# Patient Record
(demographics unavailable — no encounter records)

---

## 2017-02-07 NOTE — OP
DATE OF SURGERY:  02/07/2017



PREOPERATIVE DIAGNOSIS:  Renal failure, dialysis dependent.



POSTOPERATIVE DIAGNOSIS:  Renal failure, dialysis dependent.



PROCEDURE PERFORMED:  Placement of left forearm arteriovenous fistula.



SURGEON:  Dr. Real Escudero



ANESTHESIA:  Local MAC.



INDICATIONS:  A 52-year-old diabetic male with severe peripheral vascular

disease and end-stage renal disease who has had a previous below knee

amputation, is currently on dialysis by way of a right internal jugular dialysis

catheter.  He needs long-term access.  His exam shows an adequate cephalic vein

in the forearm on the left.  There was good blood flow in the radial artery on

the left, but it is significantly calcified.  Recommendation was for left

radiocephalic arteriovenous fistula.



PROCEDURE:  After informed consent was obtained, the patient was prepped and

draped in a sterile fashion.  A longitudinal incision was made between the

cephalic vein and the radial artery.  The radial artery was isolated, was

severely calcified, nearly nonpulsatile but had excellent Doppler flow signals

in it.  The cephalic vein was then mobilized.  Distal branches were clamped,

divided and ligated with 3-0 silk suture.  A branch segment was split

longitudinally to allow for a spatulated end-to-side anastomosis onto the ____

artery.  The patient received 3000 units of intravenous heparin.  Vessel loops

were utilized on this vessel which did not provide any adequate hemostasis. 

Therefore, small ring handle bulldogs were then placed on the vessel to stop

bleeding from this severely calcified vessel.  The vein was trimmed and an

end-to-side anastomosis was completed with a 7-0 Prolene suture.  Prior to

completion of the anastomosis, the clamps were removed.  The outflow radial

artery and the inflow radial artery were both dilated with a 1.5 mm Bakes

dilator.  Excellent pulsatile flow was noted and blood flow was reinitiated

through the cephalic vein.  Suture line was completed.  Hemostasis appeared to

be excellent.  The vein was mobilized a little bit.  It dilated nicely and there

was excellent outflow thrill.  There was excellent forward flow in the radial

artery with fistula compression, but only weak forward flow into the hand with

the fistula being open.



The patient did tolerate the procedure well and was moved from the operating

room to recovery in satisfactory stable condition.

 



______________________________

REAL ESCUDERO MD



DR:  ERIC/tanya  JOB#:  [id number] / [id number]

DD:  02/07/2017 12:59  DT:  02/07/2017 18:14

## 2017-02-07 NOTE — PDOC
VASCULAR BRIEF OPERATIVE NOTE


Date:  Feb 7, 2017


Pre-Op Diagnosis


ESRD


Post-Op Diagnosis


same


Procedure Performed


fernanda fistula , left


Surgeon


Kena


Anesthesia Type:  MAC, Local








REAL NEWMAN MD Feb 7, 2017 13:06

## 2017-04-13 NOTE — PDOC
MODERATE SEDATION ASSESSMENT


RISKS/ALTERNATIVES


Risks/Alternatives


Risks and alternatives of this type of sedation and procedure discussed with:


RISK/ALTERNATIVES:  Patient





H & P ON CHART


H & P


H & P on chart and reviewed for co-morbid conditions and appropriate labs.


H&P ON CHART:  Yes





PREGNANCY STATUS


PREG STATUS ASSESSED:  N/A





MEDS/ALLERGIES REVIEWED


Meds/Allergies Reviewed


Medications and Allergies including time and route of recently administered 

narcotics and sedatives.


MEDS/ALLERGIES REVIEWED:  Yes





ASA RATING


ASA RATING:  II





AIRWAY ASSESSMENT


Airway Assessment


Airway patency, oral function limitations, presence  of caps, crowns, dentures, 

partials, and ability to extend neck assessed.


AIRWAY ASSESSMENT:  Yes





MALLAMPATI SCORE


MALLAMPATI SCORE:  II





PRE-SEDATION ASSESSMENT


PRE-SEDATION ASSESSMENT:  Yes








CHANTELLE CARR MD Apr 13, 2017 10:39

## 2017-04-13 NOTE — PDOC1
History and Physical


Date of Procedure


Date of Admission


4/13/17





Procedure


Procedure


Left forearm RC AVF gram +/- intervention





Indication


Indication


54 YO male with ESRD, and with slowly maturing left forearm AVF.  Juxta-

anastomotic stricture by outside noninvasive study.





Past Medical History


Past Medical History


See Nursing Pre procedure PMH





Past Surgical History


Past Surgical History


See Nursing Pre procedure PSH





Current Medications


Current Medications





 Current Medications


Lidocaine/Sodium Bicarbonate 20 ml 20 ml STK-MED ONCE IJ ;  Start 4/13/17 at 08:

41;  Stop 4/13/17 at 08:42;  Status DC


Heparin Sodium/ Sodium Chloride 500 ml @  As Directed STK-MED ONCE .ROUTE ;  

Start 4/13/17 at 08:42;  Stop 4/13/17 at 08:43;  Status DC


Iodixanol (Visipaque 320) 100 ml STK-MED ONCE .ROUTE ;  Start 4/13/17 at 08:42;

  Stop 4/13/17 at 08:43;  Status DC


Iodixanol (Visipaque 320) 50 ml STK-MED ONCE .ROUTE ;  Start 4/13/17 at 08:42;  

Stop 4/13/17 at 08:43;  Status DC


Fentanyl Citrate (Fentanyl 2ml Vial) 100 mcg STK-MED ONCE .ROUTE ;  Start 4/13/ 17 at 08:54;  Stop 4/13/17 at 08:55;  Status DC


Midazolam HCl (Versed) 2 mg STK-MED ONCE .ROUTE ;  Start 4/13/17 at 08:55;  

Stop 4/13/17 at 08:56;  Status DC


Heparin Sodium (Porcine) (Heparin Sodium) 10,000 unit STK-MED ONCE .ROUTE ;  

Start 4/13/17 at 08:55;  Stop 4/13/17 at 08:56;  Status DC


Iodixanol (Visipaque 320) 100 ml STK-MED ONCE .ROUTE ;  Start 4/13/17 at 09:22;

  Stop 4/13/17 at 09:23;  Status DC


Heparin Sodium/ Sodium Chloride 1,000 unit 1X  ONCE IART  Last administered on 4 /13/17at 10:34;  Start 4/13/17 at 10:00;  Stop 4/13/17 at 10:05;  Status DC


Lidocaine/Sodium Bicarbonate (Buffered Lidocaine 1%) 20 ml 1X  ONCE IJ  Last 

administered on 4/13/17at 10:35;  Start 4/13/17 at 10:00;  Stop 4/13/17 at 10:05

;  Status DC


Midazolam HCl (Versed) 2 mg 1X  ONCE IV  Last administered on 4/13/17at 10:35;  

Start 4/13/17 at 10:00;  Stop 4/13/17 at 10:05;  Status DC


Fentanyl Citrate (Fentanyl 2ml Vial) 100 mcg 1X  ONCE IV  Last administered on 4 /13/17at 10:36;  Start 4/13/17 at 10:00;  Stop 4/13/17 at 10:05;  Status DC


Iodixanol (Visipaque 320) 100 ml 1X  ONCE IART  Last administered on 4/13/17at 

10:34;  Start 4/13/17 at 10:00;  Stop 4/13/17 at 10:05;  Status DC


Heparin Sodium (Porcine) (Heparin Sodium) 3,000 unit 1X  ONCE IV  Last 

administered on 4/13/17at 10:36;  Start 4/13/17 at 10:15;  Stop 4/13/17 at 10:16

;  Status DC





Active Scripts


Active


Sodium Bicarbonate 650 Mg Tablet 650 Mg PO TID


Cozaar (Losartan Potassium) 50 Mg Tablet 100 Mg PO DAILY08


Gabapentin 300 Mg Capsule 300 Mg PO DAILY


Reported


Isosorbide Mononitrate 20 Mg Tablet 60 Mg PO DAILY


Clopidogrel (Clopidogrel Bisulfate) 75 Mg Tablet 1 Tab PO DAILY


Aspirin 325 Mg Tablet 1 Tab PO DAILY


Ranexa (Ranolazine) 500 Mg Tab.er.12h 1 Tab PO BID


Humulin N Kwikpen (Nph, Human Insulin Isophane) 100 Unit/1 Ml Insuln.pen 40 

Unit SQ BIDAC


Percocet  Mg Tablet (Oxycodone/Acetaminophen) 1 Each Tablet 1 Tab PO QID


Amlodipine Besylate 10 Mg Tablet 10 Mg PO DAILY


Ventolin Hfa Inhaler (Albuterol Sulfate) 18 Gm Hfa.aer.ad 2 Puff INH Q4HRS PRN


Carvedilol 25 Mg Tablet 25 Mg PO BID





Allergies


Allergies:  


Coded Allergies:  


     No Known Drug Allergies (Unverified , 2/7/17)





Physical Exam


Vital Signs





 Vital Signs








  Date Time  Temp Pulse Resp B/P Pulse Ox O2 Delivery O2 Flow Rate FiO2


 


4/13/17 10:36   14  98 Nasal Cannula 2.0 


 


4/13/17 07:55 98.0 82  116/81    





 98.0       








Lungs:  Clear to auscultation


Heart:  Regular rate


Psych/Mental Status:  Mental status NL


Other


Patent left RC AVF, with suboptimal thrill.





Diagnostic Data/Imaging


Images


No prior imaging available





Assessment


Assessment


ESRD with slowly maturing left forearm RC AVF, and with juxta-anastomotic 

stricture on outside noninvasive study.


Problems:  





Plan


Plan


Dx AVF gram +/- intervention---probable PTA juxta-anastomotic stricture.








CHANTELLE CARR MD Apr 13, 2017 10:44

## 2017-04-13 NOTE — PDOC
Exam








Adalgisa





Assistant


Assistant


F Ndumbu





Pre-Procedure Diagnosis


Pre-Procedure Diagnosis


ESRD with slowly maturing left forearm RC AVF, with juxta-anastomotic stricture 

by outside noninvasive study..





Post-Procedure Diagnosis


Post-Procedure Diagnosis


Same





Procedure Performed


Procedure Performed


Left forearm AVF gram via sono guided brachial artery puncture and via sono 

guided cephalic vein puncture.


PTA juxta-anastomotic cephalic vein stricture.





Type of Anesthesia


Type of Anesthesia


Local + Mod sedation





Estimated Blood Loss


EBL:


25 cc





Condition of Patient


Condition of Patient


Stable. No apparent complication.





Disposition


Disposition


Home from [facility] post recovery, if no problems.  F/u with Vascular Surgery.  

Full report to follow.  Continue HD via tunneled HDC until AVF matures.








CHANTELLE CARR MD Apr 13, 2017 10:52

## 2017-04-14 NOTE — RAD
Left forearm radiocephalic AV fistulogram with angioplasty



Indication: 53-year-old male with end stage renal disease.  Delayed maturation

of left forearm radiocephalic AV fistula.   Significant juxta anastomotic

stricture by outside noninvasive study.  AV fistulogram, with possible

intervention, has been requested by vascular surgery.



Fluoroscopy time: 17.4 minutes



Kerma-area Product:    30 Gycm2



Contrast material: 97 cc Visipaque 320



Anesthesia: 85 minutes moderate sedation was provided utilizing a total of 2

mg Versed and 100 mcg fentanyl, IV. The patient was appropriately monitored by

a qualified independent observer throughout the time of moderate sedation.



Consent: The procedure was explained in its entirety to the patient and/or the

patient's designated representative by a member of the treatment team. This

included a discussion of risks and benefits and commonly accepted alternatives

to the procedure, as well as expected consequences of no treatment at all.

Discussion of risks included, but was not limited to, those that are most

frequent and those that are rare, but possibly severe or life-threatening, as

well as the possibility of unforeseen complications.



Sterility: All elements of maximal sterile barrier technique were utilized,

including cap, mask, sterile gown, sterile gloves, large sterile sheet,

appropriate hand hygiene, and 2% chlorhexidine for cutaneous antisepsis.





Procedure: Informed consent was obtained from the patient. He was placed

supine on the angiography table. Preliminary ultrasound examination over

distal left upper arm revealed widely patent left brachial artery. This was

documented with a hard copy ultrasound image. Using aseptic technique, local

anesthesia, and direct ultrasound guidance, a micropuncture needle was

successfully introduced into distal left brachial artery. The micropuncture

needle was exchanged over a microguidewire for the 3 Kazakh inner dilator of a

micropuncture sheath.  Preliminary ultrasound examination over distal left

upper arm also revealed widely patent cephalic outflow vein. This was

documented with an additional hard copy ultrasound image. Using aseptic

technique, local anesthesia, direct ultrasound guidance, and the micropuncture

system, a 4 Kazakh sheath was successfully introduced into left cephalic

outflow vein, just above elbow, with its tip directed peripherally.



AV fistulogram: Visipaque 320 was injected through the 3 Kazakh brachial

artery dilator, and DSA images were obtained from radiocephalic anastomosis

through elbow. Additional Visipaque 320 was injected through the 4 Kazakh

cephalic outflow vein sheath, and DSA images were obtained from elbow through

right atrium.

Findings: Radiocephalic AV anastomosis is widely patent. Native radial artery

above the anastomosis is diffusely calcific, without focal hemodynamically

significant stenosis. Tandem stenoses are noted within juxta anastomotic

segment of left cephalic outflow vein, one moderate and one high-grade. 

Cephalic outflow vein is otherwise widely patent through cephalic arch.

Additional AV fistula outflow occurs via basilic vein, which is widely patent

from proximal forearm through axillary vein. Left upper extremity central

venous structures are widely patent, without significant axillary vein,

subclavian vein, or innominate vein stenosis. Superior vena cava is widely

patent. Indwelling right IJ tunneled hemodialysis catheter is noted, with its

tip at cavoatrial junction.



Cephalic vein PTA: The 4 Kazakh cephalic outflow vein sheath was exchanged

over a guidewire for a 5 Kazakh sheath. Subsequently, following IV bolus

administration of 3000 units heparin, a grand slam microguidewire was advanced

through a 4 Kazakh angled glide catheter peripherally within left cephalic

outflow vein, across AV anastomosis, into distal left radial artery just above

wrist. Utilizing control injections through the distal brachial artery dilator

for guidance, a 4 mm x 40 mm Cordis chocolate PTA balloon was advanced through

the 5 Kazakh sheath over the grand slam wire, and was utilized to perform

prolonged (5 minutes) balloon dilatation of the tandem juxta anastomotic

cephalic vein strictures to a peak pressure of 12 nessa.  The chocolate PTA

balloon was then deflated and removed.  Visipaque 320 was again injected

through the distal brachial artery dilator, and post angioplasty DSA images

were obtained. These images revealed suboptimal angiographic improvement.

Therefore, a 5 mm x 20 mm Cordis aviator PTA balloon was advanced through the

cephalic vein sheath over the grand slam wire and was utilized to perform

additional prolonged (4 minutes) balloon dilatation of juxta anastomotic

cephalic vein to a peak pressure of 14 nessa.  The aviator PTA balloon was

deflated and removed. Visipaque 320 was again injected through the brachial

artery dilator and completion DSA images were obtained. These images revealed

brisk antegrade flow through the AV fistula, with complete resolution of the

juxta anastomotic cephalic vein stenoses, and without complicating dissection.



Patient tolerated the procedures well without apparent complication.

Hemostasis was achieved at the cephalic vein sheath removal site utilizing 2-0

silk in a pursestring fashion. Hemostasis was achieved at the distal brachial

artery 3 Kazakh dilator removal site utilizing manual pressure.



Impression:

1. Essentially unremarkable distal left forearm radiocephalic AV fistula,

apart from tandem moderate and high-grade juxta anastomotic cephalic vein

strictures.

2. Successful, uneventful balloon dilatation of juxta anastomotic cephalic

vein stenoses, as described.

## 2017-05-16 NOTE — ED.ADGEN
Past Medical History


Past Medical History:  CAD, Diabetes-Type II, Hypertension, Renal Disease, 

Renal Failure, Other


Additional Past Medical Histor:  MILD MI?


Past Surgical History:  Other


Additional Past Surgical Histo:  L BKA, L ARM FISTULA, SHUNT R CHEST


Alcohol Use:  None


Drug Use:  None





Adult General


Chief Complaint


Chief Complaint:  CHEST PAIN





HPI


HPI





Patient is a 53  year old dialysis patient who presents with generalized 

weakness and fatigue since bleeding dialysis yesterday. Patient was on route to 

his PCPs office this afternoon when he broke out in a sweat diverted to come to 

the emergency department. Patient reports occasional chest tightness, but he 

denies any chest discomfort or pain while in the emergency department. He 

denies shortness of breath, dictations, increased leg pain or swelling. He 

denies orthopnea and dyspnea on exertion. Patient denies fever chills, nausea 

vomiting and sweats. No other acute symptoms or complaints. He is accompanied 

at bedside by his spouse.





Review of Systems


Review of Systems


ROS as per HPI





Current Medications


Current Medications





Current Medications








 Medications


  (Trade)  Dose


 Ordered  Sig/Yisel  Start Time


 Stop Time Status Last Admin


Dose Admin


 


 Sodium Chloride  1,000 ml @ 


 1,000 mls/hr  1X  ONCE  5/16/17 17:30


 5/16/17 18:29 DC 5/16/17 17:39


1,000 MLS/HR











Allergies


Allergies





Allergies








Coded Allergies Type Severity Reaction Last Updated Verified


 


  No Known Drug Allergies    2/7/17 No











Physical Exam


Physical Exam





Constitutional: Well developed, well nourished, no acute distress, non-toxic 

appearance. 


HENT: Normocephalic, atraumatic, bilateral external ears normal, oropharynx 

moist, no oral exudates, nose normal.


Eyes: PERRLA, EOMI, conjunctiva normal, no discharge. 


Neck: Normal range of motion, no tenderness.


Cardiovascular:Heart rate regular rhythm, no murmur. Right chest wall vascular 

catheter.


Lungs & Thorax:  Bilateral breath sounds clear to auscultation.


Abdomen: Bowel sounds normal, soft, no tenderness, no masses, no pulsatile 

masses. 


Skin: Warm, dry, no erythema, no rash. 


Back: No tenderness.


Extremities: No tenderness, left leg amputation.


Neurologic: Alert and oriented X 3, normal motor function, normal sensory 

function, no focal deficits noted.


Psychologic: Affect normal, judgement normal, mood normal.





Current Patient Data


Vital Signs





 Vital Signs








  Date Time  Temp Pulse Resp B/P (MAP) Pulse Ox O2 Delivery O2 Flow Rate FiO2


 


5/16/17 18:52  86  195/94 (127) 95 Room Air  


 


5/16/17 16:20 98.2  18     





 98.2       








Lab Values





 Laboratory Tests








Test


  5/16/17


16:25 5/16/17


17:37 5/16/17


17:59


 


White Blood Count


  8.2 x10^3/uL


(4.0-11.0) 


  


 


 


Red Blood Count


  4.59 x10^6/uL


(4.30-5.70) 


  


 


 


Hemoglobin


  12.6 g/dL


(13.0-17.5)  L 


  


 


 


Hematocrit


  37.6 %


(39.0-53.0)  L 


  


 


 


Mean Corpuscular Volume


  82 fL ()


  


  


 


 


Mean Corpuscular Hemoglobin 28 pg (25-35)    


 


Mean Corpuscular Hemoglobin


Concent 34 g/dL


(31-37) 


  


 


 


Red Cell Distribution Width


  16.0 %


(11.5-14.5)  H 


  


 


 


Platelet Count


  222 x10^3/uL


(140-400) 


  


 


 


Neutrophils (%) (Auto) 52 % (31-73)    


 


Lymphocytes (%) (Auto) 36 % (24-48)    


 


Monocytes (%) (Auto) 9 % (0-9)    


 


Eosinophils (%) (Auto) 2 % (0-3)    


 


Basophils (%) (Auto) 1 % (0-3)    


 


Neutrophils # (Auto)


  4.2 x10^3uL


(1.8-7.7) 


  


 


 


Lymphocytes # (Auto)


  3.0 x10^3/uL


(1.0-4.8) 


  


 


 


Monocytes # (Auto)


  0.8 x10^3/uL


(0.0-1.1) 


  


 


 


Eosinophils # (Auto)


  0.2 x10^3/uL


(0.0-0.7) 


  


 


 


Basophils # (Auto)


  0.1 x10^3/uL


(0.0-0.2) 


  


 


 


Sodium Level


  137 mmol/L


(136-145) 


  


 


 


Potassium Level


  4.3 mmol/L


(3.5-5.1) 


  


 


 


Chloride Level


  100 mmol/L


() 


  


 


 


Carbon Dioxide Level


  27 mmol/L


(21-32) 


  


 


 


Anion Gap 10 (6-14)    


 


Blood Urea Nitrogen


  33 mg/dL


(8-26)  H 


  


 


 


Creatinine


  3.3 mg/dL


(0.7-1.3)  H 


  


 


 


Estimated GFR


(Cockcroft-Gault) 19.7  


  


  


 


 


BUN/Creatinine Ratio 10 (6-20)    


 


Glucose Level


  232 mg/dL


(70-99)  H 


  


 


 


Calcium Level


  9.0 mg/dL


(8.5-10.1) 


  


 


 


Total Bilirubin


  0.2 mg/dL


(0.2-1.0) 


  


 


 


Aspartate Amino Transferase


(AST) 18 U/L (15-37)


  


  


 


 


Alanine Aminotransferase (ALT)


  22 U/L (16-63)


  


  


 


 


Alkaline Phosphatase


  87 U/L


() 


  


 


 


Troponin I Quantitative


  < 0.017 ng/mL


(0.000-0.055) 


  


 


 


NT-Pro-B-Type Natriuretic


Peptide 716 pg/mL


(0-124)  H 


  


 


 


Total Protein


  8.1 g/dL


(6.4-8.2) 


  


 


 


Albumin


  3.5 g/dL


(3.4-5.0) 


  


 


 


Albumin/Globulin Ratio


  0.8 (1.0-1.7)


L 


  


 


 


Glucose (Fingerstick)


  


  223 mg/dL


(70-99)  H 


 


 


Urine Collection Type   Unknown  


 


Urine Color   Yellow  


 


Urine Clarity   Clear  


 


Urine pH   7.5  


 


Urine Specific Gravity   1.015  


 


Urine Protein


  


  


  >=300 mg/dL


(NEG-TRACE)


 


Urine Glucose (UA)


  


  


  500 mg/dL


(NEG)


 


Urine Ketones (Stick)


  


  


  Negative mg/dL


(NEG)


 


Urine Blood   Trace (NEG)  


 


Urine Nitrite


  


  


  Negative (NEG)


 


 


Urine Bilirubin


  


  


  Negative (NEG)


 


 


Urine Urobilinogen Dipstick


  


  


  0.2 mg/dL (0.2


mg/dL)


 


Urine Leukocyte Esterase


  


  


  Negative (NEG)


 


 


Urine RBC


  


  


  6-10 /HPF


(0-2)


 


Urine WBC   0 /HPF (0-4)  


 


Urine Bacteria


  


  


  0 /HPF (0-FEW)


 


 


Urine Hyaline Casts


  


  


  Occasional


/HPF





 Laboratory Tests


5/16/17 16:25








 Laboratory Tests


5/16/17 16:25














EKG


EKG


[EKG: Normal sinus rhythm, no acute ST-T wave changes.]





Radiology/Procedures


Radiology/Procedures


Chest x-ray: No acute cardiopulmonary disease []





Course & Med Decision Making


Course & Med Decision Making


Pertinent Labs and Imaging studies reviewed. (See chart for details)





[Patient states symptoms are worse yesterday after dialysis the patient was 

outdoors in the heat. He and did not have his air conditioning turn on inside 

of his home. IV fluids given, patient states he feels better. Stands and walks 

around the emergency department states his symptoms resolved and wishes to go 

home. Lab work does not show evidence of acute disease. Will discharge home 

with instructions to follow-up with PCP and cardiologist. Return precautions 

reviewed.]





Dragon Disclaimer


Dragon Disclaimer


This electronic medical record was generated, in whole or in part, using a 

voice recognition dictation system.











ERNA MASCORRO DO May 16, 2017 22:31

## 2017-05-17 NOTE — RAD
Portable chest, 5/16/2017:



History: Shortness of breath



Comparison is made to a study from 12/23/2016. A multilumen right-sided

central venous catheter remains in place extending to the level of the

atriocaval junction. The heart size and pulmonary vascularity are normal. No

pulmonary infiltrates are seen. There is no evidence of pleural fluid.



IMPRESSION: No acute cardiopulmonary abnormality is detected.

## 2017-05-17 NOTE — EKG
[facility]

              [address] [place] [address]

Test Date:    2017               Test Time:    16:29:17

Pat Name:     ARABELLA WASHINGTON           Department:   

Patient ID:   [id number]           Room:          

Gender:       M                        Technician:   

:          [id number]               Requested By: ERNA MASCORRO

Order Number: [id number]            Reading MD:   Michael Tucker

                                 Measurements

Intervals                              Axis          

Rate:         83                       P:            29

VT:           184                      QRS:          -39

QRSD:         92                       T:            28

QT:           364                                    

QTc:          428                                    

                           Interpretive Statements

SINUS RHYTHM

ABNORMAL LEFT AXIS DEVIATION



Electronically Signed On 2017 9:11:33 CDT by Michael Tucker

## 2017-05-24 NOTE — PHYS DOC
Past Medical History


Past Medical History:  CAD, Diabetes-Type II, Hypertension, Renal Disease, 

Renal Failure, Other


Past Surgical History:  Other


Additional Past Surgical Histo:  L BKA, L ARM FISTULA, SHUNT R CHEST


Alcohol Use:  None


Drug Use:  None





Adult General


Chief Complaint


Chief Complaint:  LOWER EXT PAIN





HPI


HPI





Patient is a 53  year old male who presents with left BKA stump pain and 

swelling noted gradually worsening after a fall.  Says he took a step without 

firm seating of his prosthetic and landed on the ground on his stump.  He 

denies numbness, tingling, weakness. He denies f/c, streaking, or drainage.





Review of Systems


Review of Systems





Constitutional: Denies fever or chills []


Eyes: Denies change in visual acuity, redness, or eye pain []


HENT: Denies nasal congestion or sore throat []


Respiratory: Denies cough or shortness of breath []


Cardiovascular: No additional information not addressed in HPI []


GI: Denies abdominal pain, nausea, vomiting, bloody stools or diarrhea []


: Denies dysuria or hematuria []


Musculoskeletal: Denies back pain  []


Integument: Denies rash or skin lesions []


Neurologic: Denies headache, focal weakness or sensory changes []


Endocrine: Denies polyuria or polydipsia []





Allergies


Allergies





Allergies








Coded Allergies Type Severity Reaction Last Updated Verified


 


  No Known Drug Allergies    2/7/17 No











Physical Exam


Physical Exam





Constitutional: Well developed, well nourished, no acute distress, non-toxic 

appearance. []


HENT: Normocephalic, atraumatic, bilateral external ears normal, oropharynx 

moist, nose normal. []


Eyes: PERRLA, EOMI. [] 


Neck: Normal range of motion, supple. [] 


Cardiovascular:Heart rate regular rhythm []


Lungs & Thorax:  Bilateral breath sounds clear to auscultation []


Abdomen: Bowel sounds normal, soft, no tenderness. [] 


Skin: Warm, dry, no erythema, no rash. [] 


Back: Normal ROM. [] 


Extremities: LLE with BKA, medial distal portion of extremity with rubor and 

central fluctuance of 0.5cm, no central clearing or color change, no induration/

warmth/crepitance, ROM intact 


Neurologic: Alert and oriented X 3, normal motor function, normal sensory 

function, no focal deficits noted. []


Psychologic: Affect normal, judgement normal, mood normal. []





Current Patient Data


Vital Signs





 Vital Signs








  Date Time  Temp Pulse Resp B/P (MAP) Pulse Ox O2 Delivery O2 Flow Rate FiO2


 


5/24/17 13:18 98.2 90 20  97 Room Air  





 98.2       











Course & Med Decision Making


Course & Med Decision Making


Most likely has hematoma, but will treat with antibiotics for possible 

infection.  Recommend supportive care and close follow up with PCP.  Discussed 

to do frequent wound checks.  Return precautions given.  He understands and 

agrees with plan.





Dragon Disclaimer


Dragon Disclaimer


This electronic medical record was generated, in whole or in part, using a 

voice recognition dictation system.





Departure


Departure


Impression:  


 Primary Impression:  


 Leg pain, left


Disposition:  01 HOME, SELF-CARE


Condition:  STABLE


Referrals:  


ARIC MATUTE MD (PCP)


Patient Instructions:  Hematoma, Easy-to-Read





Additional Instructions:  


Take Bactrim for possible skin infection. Take hydrocodone as needed for severe 

pain. Do not drink, drive or operate heavy machinery after taking hydrocodone 

as it may make you sleepy. Follow-up with your primary care doctor within one 

week. Return for any concerns.


Scripts


Hydrocodone Bit/Acetaminophen (HYDROCODONE-APAP 5-325  **) 1 Each Tablet


1 TAB PO PRN Q6HRS Y for PAIN, #10 TAB 0 Refills


   Prov: LIANNE HOU MD         5/24/17 


Sulfamethoxazole/Trimethoprim (BACTRIM DS TABLET) 1 Each Tablet


1 TAB PO DAILY, #7 TAB


   Prov: LIANNE HOU MD         5/24/17











LIANNE HOU MD May 24, 2017 14:26

## 2017-07-13 NOTE — PDOC
VASCULAR BRIEF OPERATIVE NOTE


Date:  Jul 13, 2017


Pre-Op Diagnosis


Renal failure.


Post-Op Diagnosis


Same


Procedure Performed


Revision of left forearm AV fistula with transposition of the cephalic vein 

onto the more proximal radial artery


Surgeon


Kena


Anesthesia Type:  MAC, Local


Other


Indication for operative procedure.





This is a 53-year-old male with end-stage renal disease, dialysis dependent. He 

has had a previous forearm AV fistula placed on the left. This has failed to 

mature adequately. Recent fistula duplex examination so shows flow volume of 

271 mL/m. There is a probable radial artery occlusion distal to the AV fistula.





Recommendation was for revision of the AV fistula with transposition to more 

proximal radial artery segment.





Description of the operative procedure: Patient was brought to the operating 

room. Intravenous sedation and preoperative antibiotics were administered. His 

prepped and draped in a sterile fashion. 1% Xylocaine was utilized to 

anesthetize the tissues in the left forearm just proximal to the previously 

created arteriovenous fistula. A longitudinal incision was then created using 

15 blade. Soft tissues were divided with electrocautery. The outflow cephalic 

vein was identified and dissected free from surrounding tissues and isolated.





The radial artery was then isolated. This was a significantly calcified vessel 

but with a palpable pulse.





Patient received 3000 units of intravenous heparin. After 3 minutes heparin 

circulation the vessel loops on the radial artery were secured. The distal 

cephalic vein was Divided and ligated with a 3-0 silk suture. The proximal 

cephalic vein was then incised longitudinally and a small Spring bulldog was 

placed on the proximal segment.





The vein was spatulated and an end-to-side anastomosis was completed with a 

running suture of 7-0 Prolene. Once his suture line had been completed blood 

flow was reinstituted into the cephalic vein. There was an excellent thrill. A 

small amount of femoral or Surgicel was utilized to assist with hemostasis.





The wound was closed with running 3-0 Vicryl placed and subcuticular tissues. 

Steri-Strips and sterile dressings were applied. The patient was moved from the 

operating room to recovery in satisfactory stable condition.





Blood loss minimal.











REAL NEWMAN MD Jul 13, 2017 16:40

## 2017-07-13 NOTE — PDOC
VASCULAR BRIEF OPERATIVE NOTE


Pre-Op Diagnosis


ESRD


Post-Op Diagnosis


same


Procedure Performed


revision left forearm AVF


Surgeon


Kena


Anesthesia Type:  MAC, Local











REAL NEWMAN MD Jul 13, 2017 16:19

## 2017-09-27 NOTE — PDOC2
CONSULT


Date of Consult


Date of Consult


DATE: 9/27/17 


TIME: 20:16





Reason for Consult


Reason for Consult:


Chest pain





Referring Physician


Referring Physician:


Dr. Goff





Identification/Chief Complaint


Chief Complaint


Chest pain


Problems:  





History of Present Illness


Reason for Visit:


This patient is a 53-year-old gentleman that has a history of end-stage renal 

disease and has been on dialysis for some time.





Last year he had some episodes of chest pains and a workup was done and he was 

found to have multivessel coronary artery disease and a decreased left 

ventricular function.





Due to the poor LV function he was decided to treat the patient medically.





He had been doing rather well until about a week ago when he started feeling 

very fatigued and tired and unable to get around like before.





He then went to dialysis today and all of a sudden started having severe chest 

pressure pain and due to that he was referred to the emergency room.





After he arrived in the ER I was called and I came to see the patient in the ER.





At the time that I saw him the patient was having ongoing chest pains.





He is EKG did not show any acute ST abnormalities.





Past Medical History


Cardiovascular:  CAD, HTN, Hyperlipidemia, Other (ischemic cardiomyopathy)


Pulmonary:  COPD


GI:  Constipation


Heme/Onc:  Anemia NOS


Renal/:  Chronic renal insuff


Endocrine:  Diabetes





Past Surgical History


Past Surgical History:  Other





Family History


Family History:  Diabetes, Hypertension





Social History


ALCOHOL:  none


Lives:  Alone





Current Problem List


Problem List


Problems


Medical Problems:


(1) Chest pain


Status: Acute  











Current Medications


Current Medications





Current Medications


Iodixanol (Visipaque 320) 100 ml STK-MED ONCE .ROUTE ;  Start 9/27/17 at 11:27;

  Stop 9/27/17 at 11:28;  Status DC


Lidocaine HCl 20 ml STK-MED ONCE .ROUTE ;  Start 9/27/17 at 11:27;  Stop 9/27/ 17 at 11:28;  Status DC


Heparin Sodium/ Sodium Chloride 1,000 ml @  As Directed STK-MED ONCE .ROUTE ;  

Start 9/27/17 at 11:28;  Stop 9/27/17 at 11:29;  Status DC


Ondansetron HCl (Zofran) 4 mg PRN Q8HRS  PRN IV NAUSEA/VOMITING;  Start 9/27/17 

at 12:30;  Stop 9/28/17 at 12:29


Fentanyl Citrate (Fentanyl 2ml Vial) 100 mcg STK-MED ONCE .ROUTE ;  Start 9/27/ 17 at 12:33;  Stop 9/27/17 at 12:34;  Status DC


Midazolam HCl (Versed) 5 mg STK-MED ONCE .ROUTE ;  Start 9/27/17 at 12:33;  

Stop 9/27/17 at 12:34;  Status DC


Heparin Sodium (Porcine) (Heparin Sodium) 10,000 unit STK-MED ONCE .ROUTE ;  

Start 9/27/17 at 12:59;  Stop 9/27/17 at 13:00;  Status DC


Heparin Sodium/ Dextrose 500 ml @  As Directed STK-MED ONCE IV ;  Start 9/27/17 

at 13:21;  Stop 9/27/17 at 13:22;  Status DC


Heparin Sodium/ Sodium Chloride 1,000 unit 1X  ONCE IART  Last administered on 9 /27/17at 13:40;  Start 9/27/17 at 13:00;  Stop 9/27/17 at 13:47;  Status DC


Midazolam HCl (Versed) 5 mg 1X  ONCE IV  Last administered on 9/27/17at 13:41;  

Start 9/27/17 at 13:00;  Stop 9/27/17 at 13:47;  Status DC


Fentanyl Citrate (Fentanyl 2ml Vial) 100 mcg 1X  ONCE IV  Last administered on 9 /27/17at 13:42;  Start 9/27/17 at 13:00;  Stop 9/27/17 at 13:47;  Status DC


Iodixanol (Visipaque 320) 100 ml 1X  ONCE IART  Last administered on 9/27/17at 

13:40;  Start 9/27/17 at 13:00;  Stop 9/27/17 at 13:47;  Status DC


Heparin Sodium (Porcine) (Heparin Sodium) 8,000 unit 1X  ONCE IV  Last 

administered on 9/27/17at 13:43;  Start 9/27/17 at 13:00;  Stop 9/27/17 at 13:47

;  Status DC


Lidocaine HCl 20 ml 1X  ONCE IJ  Last administered on 9/27/17at 13:40;  Start 9/ 27/17 at 13:00;  Stop 9/27/17 at 13:47;  Status DC


Heparin Sodium (Porcine) (Heparin 5,000 Units/1,000ml NS) 5,000 unit 1X  ONCE 

IV  Last administered on 9/27/17at 13:00;  Start 9/27/17 at 13:00;  Stop 9/27/ 17 at 13:47;  Status DC


Heparin Sodium/ Dextrose 500 ml @  20 mls/hr CONT PRN  PRN IV SEE COMMENTS Last 

administered on 9/27/17at 13:45;  Start 9/27/17 at 13:00


Oxycodone/ Acetaminophen (Percocet 10/325) 1 tab PRN Q6HRS  PRN PO PAIN Last 

administered on 9/27/17at 17:08;  Start 9/27/17 at 16:45


Cyclobenzaprine HCl (Flexeril) 10 mg PRN Q6HRS  PRN PO MUSCLE SPASMS Last 

administered on 9/27/17at 17:08;  Start 9/27/17 at 16:45


Diazepam (Valium) 5 mg PRN Q6HRS  PRN PO ANXIETY Last administered on 9/27/17at 

17:08;  Start 9/27/17 at 16:45





Active Scripts


Active


Sodium Bicarbonate 650 Mg Tablet 650 Mg PO TID


Cozaar (Losartan Potassium) 50 Mg Tablet 100 Mg PO DAILY08


Gabapentin 300 Mg Capsule 300 Mg PO DAILY


Reported


Isosorbide Mononitrate 20 Mg Tablet 60 Mg PO DAILY


Clopidogrel (Clopidogrel Bisulfate) 75 Mg Tablet 1 Tab PO DAILY


Aspirin 325 Mg Tablet 1 Tab PO DAILY


Ranexa (Ranolazine) 500 Mg Tab.er.12h 1 Tab PO BID


Humulin N Kwikpen (Nph, Human Insulin Isophane) 100 Unit/1 Ml Insuln.pen 40 

Unit SQ BIDAC


Percocet  Mg Tablet (Oxycodone/Acetaminophen) 1 Each Tablet 1 Tab PO QID


Amlodipine Besylate 10 Mg Tablet 10 Mg PO DAILY


Ventolin Hfa Inhaler (Albuterol Sulfate) 18 Gm Hfa.aer.ad 2 Puff INH Q4HRS PRN


Carvedilol 25 Mg Tablet 25 Mg PO BID





Allergies


Allergies:  


Coded Allergies:  


     No Known Drug Allergies (Unverified , 7/13/17)





Physical Exam


General:  Alert, Oriented X3, Cooperative


HEENT:  Atraumatic, PERRLA


Lungs:  Clear to auscultation


Heart:  Regular rate, Normal S1, Normal S2


Abdomen:  Normal bowel sounds, Soft


Extremities:  No edema, Other (patient is an amputee on the left leg)





Vitals


VITALS





Vital Signs








  Date Time  Temp Pulse Resp B/P (MAP) Pulse Ox O2 Delivery O2 Flow Rate FiO2


 


9/27/17 20:00 98.4 84 18 97/60 (72) 100 Room Air  





 98.4       


 


9/27/17 13:42       2.0 











Labs


Labs





Laboratory Tests








Test


  9/27/17


10:32 9/27/17


18:35


 


White Blood Count


  8.2 x10^3/uL


(4.0-11.0) 


 


 


Red Blood Count


  4.41 x10^6/uL


(4.30-5.70) 


 


 


Hemoglobin


  12.6 g/dL


(13.0-17.5) 


 


 


Hematocrit


  37.8 %


(39.0-53.0) 


 


 


Mean Corpuscular Volume 86 fL ()  


 


Mean Corpuscular Hemoglobin 29 pg (25-35)  


 


Mean Corpuscular Hemoglobin


Concent 34 g/dL


(31-37) 


 


 


Red Cell Distribution Width


  13.9 %


(11.5-14.5) 


 


 


Platelet Count


  322 x10^3/uL


(140-400) 


 


 


Neutrophils (%) (Auto) 53 % (31-73)  


 


Lymphocytes (%) (Auto) 35 % (24-48)  


 


Monocytes (%) (Auto) 8 % (0-9)  


 


Eosinophils (%) (Auto) 4 % (0-3)  


 


Basophils (%) (Auto) 1 % (0-3)  


 


Neutrophils # (Auto)


  4.3 x10^3uL


(1.8-7.7) 


 


 


Lymphocytes # (Auto)


  2.8 x10^3/uL


(1.0-4.8) 


 


 


Monocytes # (Auto)


  0.6 x10^3/uL


(0.0-1.1) 


 


 


Eosinophils # (Auto)


  0.3 x10^3/uL


(0.0-0.7) 


 


 


Basophils # (Auto)


  0.1 x10^3/uL


(0.0-0.2) 


 


 


Prothrombin Time


  12.4 SEC


(11.7-14.0) 


 


 


Prothromb Time International


Ratio 1.0 (0.8-1.1) 


  


 


 


Sodium Level


  137 mmol/L


(136-145) 


 


 


Potassium Level


  4.0 mmol/L


(3.5-5.1) 


 


 


Chloride Level


  96 mmol/L


() 


 


 


Carbon Dioxide Level


  29 mmol/L


(21-32) 


 


 


Anion Gap 12 (6-14)  


 


Blood Urea Nitrogen


  22 mg/dL


(8-26) 


 


 


Creatinine


  2.7 mg/dL


(0.7-1.3) 


 


 


Estimated GFR


(Cockcroft-Gault) 24.8 


  


 


 


Glucose Level


  216 mg/dL


(70-99) 


 


 


Calcium Level


  8.8 mg/dL


(8.5-10.1) 


 


 


Magnesium Level


  1.8 mg/dL


(1.8-2.4) 


 


 


Total Bilirubin


  0.2 mg/dL


(0.2-1.0) 


 


 


Direct Bilirubin


  0.1 mg/dL


(0.0-0.2) 


 


 


Aspartate Amino Transf


(AST/SGOT) 22 U/L (15-37) 


  


 


 


Alanine Aminotransferase


(ALT/SGPT) 24 U/L (16-63) 


  


 


 


Alkaline Phosphatase


  103 U/L


() 


 


 


Creatine Kinase


  105 U/L


() 


 


 


Creatine Kinase MB (Mass)


  1.8 ng/mL


(0.0-3.6) 


 


 


Creatine Kinase MB Relative


Index 1.7 % (0-4) 


  


 


 


Troponin I Quantitative


  < 0.017 ng/mL


(0.000-0.055) < 0.017 ng/mL


(0.000-0.055)


 


NT-Pro-B-Type Natriuretic


Peptide 402 pg/mL


(0-124) 


 


 


Total Protein


  8.9 g/dL


(6.4-8.2) 


 


 


Albumin


  4.3 g/dL


(3.4-5.0) 


 


 


Lipase


  330 U/L


() 


 








Laboratory Tests








Test


  9/27/17


10:32 9/27/17


18:35


 


White Blood Count


  8.2 x10^3/uL


(4.0-11.0) 


 


 


Red Blood Count


  4.41 x10^6/uL


(4.30-5.70) 


 


 


Hemoglobin


  12.6 g/dL


(13.0-17.5) 


 


 


Hematocrit


  37.8 %


(39.0-53.0) 


 


 


Mean Corpuscular Volume 86 fL ()  


 


Mean Corpuscular Hemoglobin 29 pg (25-35)  


 


Mean Corpuscular Hemoglobin


Concent 34 g/dL


(31-37) 


 


 


Red Cell Distribution Width


  13.9 %


(11.5-14.5) 


 


 


Platelet Count


  322 x10^3/uL


(140-400) 


 


 


Neutrophils (%) (Auto) 53 % (31-73)  


 


Lymphocytes (%) (Auto) 35 % (24-48)  


 


Monocytes (%) (Auto) 8 % (0-9)  


 


Eosinophils (%) (Auto) 4 % (0-3)  


 


Basophils (%) (Auto) 1 % (0-3)  


 


Neutrophils # (Auto)


  4.3 x10^3uL


(1.8-7.7) 


 


 


Lymphocytes # (Auto)


  2.8 x10^3/uL


(1.0-4.8) 


 


 


Monocytes # (Auto)


  0.6 x10^3/uL


(0.0-1.1) 


 


 


Eosinophils # (Auto)


  0.3 x10^3/uL


(0.0-0.7) 


 


 


Basophils # (Auto)


  0.1 x10^3/uL


(0.0-0.2) 


 


 


Prothrombin Time


  12.4 SEC


(11.7-14.0) 


 


 


Prothromb Time International


Ratio 1.0 (0.8-1.1) 


  


 


 


Sodium Level


  137 mmol/L


(136-145) 


 


 


Potassium Level


  4.0 mmol/L


(3.5-5.1) 


 


 


Chloride Level


  96 mmol/L


() 


 


 


Carbon Dioxide Level


  29 mmol/L


(21-32) 


 


 


Anion Gap 12 (6-14)  


 


Blood Urea Nitrogen


  22 mg/dL


(8-26) 


 


 


Creatinine


  2.7 mg/dL


(0.7-1.3) 


 


 


Estimated GFR


(Cockcroft-Gault) 24.8 


  


 


 


Glucose Level


  216 mg/dL


(70-99) 


 


 


Calcium Level


  8.8 mg/dL


(8.5-10.1) 


 


 


Magnesium Level


  1.8 mg/dL


(1.8-2.4) 


 


 


Total Bilirubin


  0.2 mg/dL


(0.2-1.0) 


 


 


Direct Bilirubin


  0.1 mg/dL


(0.0-0.2) 


 


 


Aspartate Amino Transf


(AST/SGOT) 22 U/L (15-37) 


  


 


 


Alanine Aminotransferase


(ALT/SGPT) 24 U/L (16-63) 


  


 


 


Alkaline Phosphatase


  103 U/L


() 


 


 


Creatine Kinase


  105 U/L


() 


 


 


Creatine Kinase MB (Mass)


  1.8 ng/mL


(0.0-3.6) 


 


 


Creatine Kinase MB Relative


Index 1.7 % (0-4) 


  


 


 


Troponin I Quantitative


  < 0.017 ng/mL


(0.000-0.055) < 0.017 ng/mL


(0.000-0.055)


 


NT-Pro-B-Type Natriuretic


Peptide 402 pg/mL


(0-124) 


 


 


Total Protein


  8.9 g/dL


(6.4-8.2) 


 


 


Albumin


  4.3 g/dL


(3.4-5.0) 


 


 


Lipase


  330 U/L


() 


 











Assessment/Plan


Assessment/Plan


This patient comes in with acute onset of severe chest pains.





In view of his history, his multiple risk factors, and his current presentation 

I would recommend to take this patient emergently to the catheter lab for an 

emergency heart catheterization. A my opinion he has unstable angina.





I discussed this with the patient and his wife and they both understand and 

agree with this approach.





We obtained an informed consent and the patient is being taken to the cath lab.





Thank you very much for asking me to participate in the care of this patient.











DAMIAN ONEAL MD Sep 27, 2017 20:22

## 2017-09-27 NOTE — CARD
--------------- APPROVED REPORT --------------





Procedure(s) performed: MODERATE SEDATION: 85 MINUTES



HISTORY

The patient is a 53 year-old male with a history of : previous MI, renal failure with dialysis, diabe
rodrigo mellitus with treatment, coronary artery disease, chronic lung disease, tobacco history() , hyper
tension, dyslipidemia.



INDICATION

The indication(s) include : unstable angina , chest pain.



PROCEDURE NARRATIVE

With the patient in the supine position the right groin area was prepped and draped in the usual [facility]
ion.



The area was infiltrated with Xylocaine to obtain topical anesthesia.



Using Seldinger technique a sheath was inserted into the femoral artery.



Views of the left coronary artery were then done.



Views of the right coronary artery were then done.



A left ventriculogram was done utilizing a pigtail catheter.



Pullback pressures were down from the LV to the AO.



I didn't reviewed the pictures.



Findings:



Coronaries the left main is normal.



The left anterior descending is 99% stenosed proximally. The first diagonal is about 90% stenosed.



The circumflex has a proximal 90% stenosis and a more distal 80% stenosis.



The RCA is a large dominant vessel that has some minimal diffuse plaquing and above the takeoff of th
e acute marginal we can see a lying down and this may represent a ruptured plaque at that level. The 
PDA has a 60% area of stenosis.



Ventriculogram: The left ventricular ejection fraction was estimated to be about 50%. This is a defin
ite improvement from the last heart catheterization with the EF was about 20. The left ventricular en
d-diastolic pressure was 15 mmHg. There was no gradient across the aortic valve.



After this was evaluated and felt that the patient was having unstable angina but due to the extent o
f the disease I would recommend bypass surgery.



Since the patient is having ongoing chest pains I decided to insert an intra-aortic balloon pump.



The sheath that was present in the artery was exchanged for an intra-aortic balloon pump sheath and t
hrough that over a guidewire the intra-aortic balloon pump catheter was advanced once I was satisfied
 with this positioning it was secured in place.



We connected the catheter to the console and obtain a good augmentation of the pressure.



The patient was free of chest pain at this point.



Seems were applied and the patient was then transferred to the intensive care unit for further care.



He was in stable condition at the time that he left the catheter lab.









Conclusion

This patient has severe triple vessel coronary artery disease and I would recommend coronary artery b
ypass surgery for him.



The intra-aortic balloon pump was inserted for support.



At this time the patient is free of chest pain and seems to be stable.



We'll heparinize the patient and consult the CV surgeons.







Recommendations

CABG

## 2017-09-27 NOTE — EKG
[facility]

              [address] [place] [address]

Test Date:    2017               Test Time:    10:26:18

Pat Name:     ARABELLA WASHINGTON           Department:   

Patient ID:   [id number]           Room:          

Gender:       M                        Technician:   

:          [id number]               Requested By: ERLIN ALCOCER

Order Number: [id number]            Reading MD:   Grabiel Shrestha

                                 Measurements

Intervals                              Axis          

Rate:         88                       P:            -35

CA:           162                      QRS:          -32

QRSD:         88                       T:            25

QT:           370                                    

QTc:          451                                    

                           Interpretive Statements

SINUS RHYTHM

ABNORMAL LEFT AXIS DEVIATION

LEFT ANTERIOR FASCICULAR BLOCK

RI6.01          Unconfirmed report

Compared to ECG 2017 16:29:17

Left anterior fascicular block now present



Electronically Signed On 10-6-2017 12:50:55 CDT by Grabiel Shrestha

## 2017-09-27 NOTE — PDOC2
CONSULT


Date of Consult


Date of Consult


DATE: 9/27/17 


TIME: 14:44





Referring Physician


Referring Physician:


Dr Mcmanus





Identification/Chief Complaint


Chief Complaint


Chest pain


Problems:  





Source


Source:  Chart review, Patient





History of Present Illness


Reason for Visit:


53-year-old male admitted with chest pain. No EKGs changes and troponin was 

negative. Left heart catheterization today demonstrated a puny left system and 

a dominant right with some modest disease. The patient has end-stage renal 

failure on dialysis and a left BKA. He is also known to have ischemic 

cardiomyopathy and is on Plavix. A IABP was placed. He still has chest pain 

which has been ongoing for 6-7 hours now. He is hemodynamically stable, in 

sinus rhythm. I was consulted to consider the patient for surgical coronary 

revascularization.





Past Medical History


Cardiovascular:  HTN, Hyperlipidemia


GI:  Constipation


Heme/Onc:  Anemia NOS


Renal/:  Chronic renal insuff


Endocrine:  Diabetes





Past Surgical History


Past Surgical History:  Other





Family History


Family History:  Diabetes, Hypertension





Social History


ALCOHOL:  none


Lives:  Alone





Current Problem List


Problem List


Problems


Medical Problems:


(1) Chest pain


Status: Acute  











Current Medications


Current Medications





Current Medications


Iodixanol (Visipaque 320) 100 ml STK-MED ONCE .ROUTE ;  Start 9/27/17 at 11:27;

  Stop 9/27/17 at 11:28;  Status DC


Lidocaine HCl 20 ml STK-MED ONCE .ROUTE ;  Start 9/27/17 at 11:27;  Stop 9/27/ 17 at 11:28;  Status DC


Heparin Sodium/ Sodium Chloride 1,000 ml @  As Directed STK-MED ONCE .ROUTE ;  

Start 9/27/17 at 11:28;  Stop 9/27/17 at 11:29;  Status DC


Ondansetron HCl (Zofran) 4 mg PRN Q8HRS  PRN IV NAUSEA/VOMITING;  Start 9/27/17 

at 12:30;  Stop 9/27/17 at 13:54;  Status DC


Fentanyl Citrate (Fentanyl 2ml Vial) 100 mcg STK-MED ONCE .ROUTE ;  Start 9/27/ 17 at 12:33;  Stop 9/27/17 at 12:34;  Status DC


Midazolam HCl (Versed) 5 mg STK-MED ONCE .ROUTE ;  Start 9/27/17 at 12:33;  

Stop 9/27/17 at 12:34;  Status DC


Heparin Sodium (Porcine) (Heparin Sodium) 10,000 unit STK-MED ONCE .ROUTE ;  

Start 9/27/17 at 12:59;  Stop 9/27/17 at 13:00;  Status DC


Heparin Sodium/ Dextrose 500 ml @  As Directed STK-MED ONCE IV ;  Start 9/27/17 

at 13:21;  Stop 9/27/17 at 13:22;  Status DC


Heparin Sodium/ Sodium Chloride 1,000 unit 1X  ONCE IART  Last administered on 9 /27/17at 13:40;  Start 9/27/17 at 13:00;  Stop 9/27/17 at 13:47;  Status DC


Midazolam HCl (Versed) 5 mg 1X  ONCE IV  Last administered on 9/27/17at 13:41;  

Start 9/27/17 at 13:00;  Stop 9/27/17 at 13:47;  Status DC


Fentanyl Citrate (Fentanyl 2ml Vial) 100 mcg 1X  ONCE IV  Last administered on 9 /27/17at 13:42;  Start 9/27/17 at 13:00;  Stop 9/27/17 at 13:47;  Status DC


Iodixanol (Visipaque 320) 100 ml 1X  ONCE IART  Last administered on 9/27/17at 

13:40;  Start 9/27/17 at 13:00;  Stop 9/27/17 at 13:47;  Status DC


Heparin Sodium (Porcine) (Heparin Sodium) 8,000 unit 1X  ONCE IV  Last 

administered on 9/27/17at 13:43;  Start 9/27/17 at 13:00;  Stop 9/27/17 at 13:47

;  Status DC


Lidocaine HCl 20 ml 1X  ONCE IJ  Last administered on 9/27/17at 13:40;  Start 9/ 27/17 at 13:00;  Stop 9/27/17 at 13:47;  Status DC


Heparin Sodium (Porcine) (Heparin 5,000 Units/1,000ml NS) 5,000 unit 1X  ONCE 

IV  Last administered on 9/27/17at 13:00;  Start 9/27/17 at 13:00;  Stop 9/27/ 17 at 13:47;  Status DC


Heparin Sodium/ Dextrose 500 ml @  20 mls/hr CONT PRN  PRN IV SEE COMMENTS Last 

administered on 9/27/17at 13:45;  Start 9/27/17 at 13:00;  Stop 9/27/17 at 13:54

;  Status DC





Active Scripts


Active


Sodium Bicarbonate 650 Mg Tablet 650 Mg PO TID


Cozaar (Losartan Potassium) 50 Mg Tablet 100 Mg PO DAILY08


Gabapentin 300 Mg Capsule 300 Mg PO DAILY


Reported


Isosorbide Mononitrate 20 Mg Tablet 60 Mg PO DAILY


Clopidogrel (Clopidogrel Bisulfate) 75 Mg Tablet 1 Tab PO DAILY


Aspirin 325 Mg Tablet 1 Tab PO DAILY


Ranexa (Ranolazine) 500 Mg Tab.er.12h 1 Tab PO BID


Humulin N Kwikpen (Nph, Human Insulin Isophane) 100 Unit/1 Ml Insuln.pen 40 

Unit SQ BIDAC


Percocet  Mg Tablet (Oxycodone/Acetaminophen) 1 Each Tablet 1 Tab PO QID


Amlodipine Besylate 10 Mg Tablet 10 Mg PO DAILY


Ventolin Hfa Inhaler (Albuterol Sulfate) 18 Gm Hfa.aer.ad 2 Puff INH Q4HRS PRN


Carvedilol 25 Mg Tablet 25 Mg PO BID





Allergies


Allergies:  


Coded Allergies:  


     No Known Drug Allergies (Unverified , 7/13/17)





ROS


General:  No: Chills, Night Sweats, Fatigue, Malaise, Appetite


PSYCHOLOGICAL ROS:  No: Anxiety, Behavioral Disorder, Concentration difficultie

, Decreased libido, Depression, Disorientation, Hallucinations, Hostility, 

Irritablity, Memory difficulties, Mood Swings, Obsessive thoughts, Physical 

abuse, Sexual abuse, Sleep disturbances, Suicidal ideation


Eyes:  No Blurry vision, No Decreased vision, No Double vision, No Dry eyes, No 

Excessive tearing, No Eye Pain, No Itchy Eyes, No Loss of vision, No Photophobia

, No Scotomata, No Uses contacts, No Uses glasses


HEENT:  No: Heacaches, Visual Changes, Hearing change, Nasal congestion, Nasal 

discharge, Oral lesions, Sinus pain, Sore Throat, Epistaxis, Sneezing, Snoring, 

Tinnitus, Vertigo, Vocal changes


ALLERGY AND IMMUNOLOGY:  No: Hives, Insect Bite Sensitivity, Itchy/Watery Eyes, 

Nasal Congestion, Post Nasal Drip, Seasonal Allergies


Hematological and Lymphatic:  No: Bleeding Problems, Blood Clots, Blood 

Transfusions, Brusing, Night Sweats, Pallor, Swollen Lymph Nodes


ENDOCRINE:  No: Breast Changes, Galactorrhea, Hair Pattern Changes, Hot Flashes

, Malaise/lethargy, Mood Swings, Palpitations, Polydipsia/polyuria, Skin Changes

, Temperature Intolerance, Unexpected Weight Changes


Respiratory:  No: Cough, Hemoptysis, Orthopnea, Pleuritic Pain, Shortness of 

breath, SOB with excertion, Sputum Changes, Stridor, Tachypnea, Wheezing


Cardiovascular:  No Chest Pain, No Palpitations, No Orthopnea, No Paroxysmal 

Noc. Dyspnea, No Edema, No Lt Headedness


Gastrointestinal:  No Nausea, No Vomiting, No Abdominal Pain, No Diarrhea, No 

Constipation, No Melena, No Hematochezia


Genitourinary:  No Dysuria, No Frequency, No Incontinence, No Hematuria, No 

Retention, No Discharge, No Urgency, No Pain, No Flank Pain


Musculoskeletal:  No Gait Disturbance, No Joint Pain, No Joint Stiffness, No 

Joint Swelling, No Muscle Pain, No Muscular Weakness, No Pain In:, No Swelling 

In:


Neurological:  No Behavorial Changes, No Bowel/Bladder ControlChng, No Confusion

, No Dizziness, No Gait Disturbance, No Headaches, No Impaired Coord/balance, 

No Memory Loss, No Numbness/Tingling, No Seizures, No Speech Problems, No 

Tremors, No Visual Changes, No Weakness


Skin:  No Dry Skin, No Eczema, No Hair Changes, No Lumps, No Mole Changes, No 

Mottling, No Nail Changes, No Pruritus, No Rash, No Skin Lesion Changes, No Acne





Physical Exam


General:  Alert, Oriented X3, mild distress


HEENT:  Atraumatic, PERRLA


Lungs:  Clear to auscultation


Heart:  Regular rate, Normal S1, Normal S2


Abdomen:  Soft, No tenderness


Extremities:  No edema


Skin:  No significant lesion


Neuro:  Normal gait, Normal speech, Strength at 5/5 X4 ext, Normal tone, 

Sensation intact, Cranial nerves 3-12 NL


MUSCULOSKELETAL:  No deformity





Vitals


VITALS





Vital Signs








  Date Time  Temp Pulse Resp B/P (MAP) Pulse Ox O2 Delivery O2 Flow Rate FiO2


 


9/27/17 13:42   18  100 Nasal Cannula 2.0 


 


9/27/17 13:29  73      


 


9/27/17 11:56    92/54 (67)    


 


9/27/17 10:32 97.7       





 97.7       











Labs


Labs





Laboratory Tests








Test


  9/27/17


10:32


 


White Blood Count


  8.2 x10^3/uL


(4.0-11.0)


 


Red Blood Count


  4.41 x10^6/uL


(4.30-5.70)


 


Hemoglobin


  12.6 g/dL


(13.0-17.5)


 


Hematocrit


  37.8 %


(39.0-53.0)


 


Mean Corpuscular Volume 86 fL () 


 


Mean Corpuscular Hemoglobin 29 pg (25-35) 


 


Mean Corpuscular Hemoglobin


Concent 34 g/dL


(31-37)


 


Red Cell Distribution Width


  13.9 %


(11.5-14.5)


 


Platelet Count


  322 x10^3/uL


(140-400)


 


Neutrophils (%) (Auto) 53 % (31-73) 


 


Lymphocytes (%) (Auto) 35 % (24-48) 


 


Monocytes (%) (Auto) 8 % (0-9) 


 


Eosinophils (%) (Auto) 4 % (0-3) 


 


Basophils (%) (Auto) 1 % (0-3) 


 


Neutrophils # (Auto)


  4.3 x10^3uL


(1.8-7.7)


 


Lymphocytes # (Auto)


  2.8 x10^3/uL


(1.0-4.8)


 


Monocytes # (Auto)


  0.6 x10^3/uL


(0.0-1.1)


 


Eosinophils # (Auto)


  0.3 x10^3/uL


(0.0-0.7)


 


Basophils # (Auto)


  0.1 x10^3/uL


(0.0-0.2)


 


Prothrombin Time


  12.4 SEC


(11.7-14.0)


 


Prothromb Time International


Ratio 1.0 (0.8-1.1) 


 


 


Sodium Level


  137 mmol/L


(136-145)


 


Potassium Level


  4.0 mmol/L


(3.5-5.1)


 


Chloride Level


  96 mmol/L


()


 


Carbon Dioxide Level


  29 mmol/L


(21-32)


 


Anion Gap 12 (6-14) 


 


Blood Urea Nitrogen


  22 mg/dL


(8-26)


 


Creatinine


  2.7 mg/dL


(0.7-1.3)


 


Estimated GFR


(Cockcroft-Gault) 24.8 


 


 


Glucose Level


  216 mg/dL


(70-99)


 


Calcium Level


  8.8 mg/dL


(8.5-10.1)


 


Magnesium Level


  1.8 mg/dL


(1.8-2.4)


 


Total Bilirubin


  0.2 mg/dL


(0.2-1.0)


 


Direct Bilirubin


  0.1 mg/dL


(0.0-0.2)


 


Aspartate Amino Transf


(AST/SGOT) 22 U/L (15-37) 


 


 


Alanine Aminotransferase


(ALT/SGPT) 24 U/L (16-63) 


 


 


Alkaline Phosphatase


  103 U/L


()


 


Creatine Kinase


  105 U/L


()


 


Creatine Kinase MB (Mass)


  1.8 ng/mL


(0.0-3.6)


 


Creatine Kinase MB Relative


Index 1.7 % (0-4) 


 


 


Troponin I Quantitative


  < 0.017 ng/mL


(0.000-0.055)


 


NT-Pro-B-Type Natriuretic


Peptide 402 pg/mL


(0-124)


 


Total Protein


  8.9 g/dL


(6.4-8.2)


 


Albumin


  4.3 g/dL


(3.4-5.0)


 


Lipase


  330 U/L


()








Laboratory Tests








Test


  9/27/17


10:32


 


White Blood Count


  8.2 x10^3/uL


(4.0-11.0)


 


Red Blood Count


  4.41 x10^6/uL


(4.30-5.70)


 


Hemoglobin


  12.6 g/dL


(13.0-17.5)


 


Hematocrit


  37.8 %


(39.0-53.0)


 


Mean Corpuscular Volume 86 fL () 


 


Mean Corpuscular Hemoglobin 29 pg (25-35) 


 


Mean Corpuscular Hemoglobin


Concent 34 g/dL


(31-37)


 


Red Cell Distribution Width


  13.9 %


(11.5-14.5)


 


Platelet Count


  322 x10^3/uL


(140-400)


 


Neutrophils (%) (Auto) 53 % (31-73) 


 


Lymphocytes (%) (Auto) 35 % (24-48) 


 


Monocytes (%) (Auto) 8 % (0-9) 


 


Eosinophils (%) (Auto) 4 % (0-3) 


 


Basophils (%) (Auto) 1 % (0-3) 


 


Neutrophils # (Auto)


  4.3 x10^3uL


(1.8-7.7)


 


Lymphocytes # (Auto)


  2.8 x10^3/uL


(1.0-4.8)


 


Monocytes # (Auto)


  0.6 x10^3/uL


(0.0-1.1)


 


Eosinophils # (Auto)


  0.3 x10^3/uL


(0.0-0.7)


 


Basophils # (Auto)


  0.1 x10^3/uL


(0.0-0.2)


 


Prothrombin Time


  12.4 SEC


(11.7-14.0)


 


Prothromb Time International


Ratio 1.0 (0.8-1.1) 


 


 


Sodium Level


  137 mmol/L


(136-145)


 


Potassium Level


  4.0 mmol/L


(3.5-5.1)


 


Chloride Level


  96 mmol/L


()


 


Carbon Dioxide Level


  29 mmol/L


(21-32)


 


Anion Gap 12 (6-14) 


 


Blood Urea Nitrogen


  22 mg/dL


(8-26)


 


Creatinine


  2.7 mg/dL


(0.7-1.3)


 


Estimated GFR


(Cockcroft-Gault) 24.8 


 


 


Glucose Level


  216 mg/dL


(70-99)


 


Calcium Level


  8.8 mg/dL


(8.5-10.1)


 


Magnesium Level


  1.8 mg/dL


(1.8-2.4)


 


Total Bilirubin


  0.2 mg/dL


(0.2-1.0)


 


Direct Bilirubin


  0.1 mg/dL


(0.0-0.2)


 


Aspartate Amino Transf


(AST/SGOT) 22 U/L (15-37) 


 


 


Alanine Aminotransferase


(ALT/SGPT) 24 U/L (16-63) 


 


 


Alkaline Phosphatase


  103 U/L


()


 


Creatine Kinase


  105 U/L


()


 


Creatine Kinase MB (Mass)


  1.8 ng/mL


(0.0-3.6)


 


Creatine Kinase MB Relative


Index 1.7 % (0-4) 


 


 


Troponin I Quantitative


  < 0.017 ng/mL


(0.000-0.055)


 


NT-Pro-B-Type Natriuretic


Peptide 402 pg/mL


(0-124)


 


Total Protein


  8.9 g/dL


(6.4-8.2)


 


Albumin


  4.3 g/dL


(3.4-5.0)


 


Lipase


  330 U/L


()











Assessment/Plan


Assessment/Plan


53-year-old male admitted with chest pain. No EKGs changes and troponin was 

negative. Left heart catheterization today demonstrated a puny left system and 

a dominant right with some modest disease. The patient has end-stage renal 

failure on dialysis and a left BKA. He is also known to have ischemic 

cardiomyopathy and is on Plavix.





The patient has very borderline surgical targets on the left. His LAD and 

circumflex system small . He has a good size RCA but without any obvious 

significant disease. 





It is possible that his chest pain is not ischemic in nature considering the 

negative EKG changes and negative troponin. His pain may be from pericarditis 

considering that he is end-stage renal failure or noncardiac cause, possible GI 

source.





Would attempt treating pain with colchicine or NSAIDs to evaluate response or 

rule out non cardiac causes.





If pain is thought to be ischemic, CABG is a potential option, but targets are 

not ideal. Will need ECHO and would wait for Plavix to wear off. He currently 

has no pain











MATTIE RAIN MD Sep 27, 2017 14:47

## 2017-09-27 NOTE — RAD
EXAM: Chest, single view.



HISTORY: Chest pain.



COMPARISON: 5/16/2017.



FINDINGS: A frontal view of the chest is obtained. There is no infiltrate,

effusion or pneumothorax. There is suspected left basilar atelectasis. The

heart is normal in size for portable technique. There is a right central

venous catheter with the tip in the superior vena cava.



IMPRESSION: No acute pulmonary finding.

## 2017-09-27 NOTE — PHYS DOC
Past Medical History


Past Medical History:  CAD, Diabetes-Type II, Hypertension, Renal Disease, 

Renal Failure, Other


Past Surgical History:  Other


Additional Past Surgical Histo:  L BKA, L ARM FISTULA, SHUNT R CHEST


Alcohol Use:  None


Drug Use:  None





Adult General


Chief Complaint


Chief Complaint:  CHEST PAIN-CARDIAC NATURE





HPI


HPI





Patient is a 53  year old male who presents with substernal chest pain. He was 

in the middle of dialysis when he started getting a right sided sharp pain he 

denied any nausea or shortness of breath associated with this. States it was a 

10 out of 10. He was given nitroglycerin which brought his pain from a 10 down 

to 5. He also received 500 ML bolus and 324 aspirin. EMS in route also came 

additional nitroglycerin sublingual which brought his blood pressure down in 

the 90s. Currently states his pain is much better even though he still has a 

small amount of pain. He rates it 3 out of 10 and states he feels slight 

shortness of breath. He states he ran just about his full dialysis run which is 

usually around 3 to half hours. His primary care physician is Dr. Aric Matute 

is cardiologist is Dr. Mejia





Review of Systems


Review of Systems





Constitutional: Denies fever or chills []


Eyes: Denies change in visual acuity, redness, or eye pain []


HENT: Denies nasal congestion or sore throat []


Respiratory: Denies cough or shortness of breath []


Cardiovascular: No additional information not addressed in HPI []


GI: Denies abdominal pain, nausea, vomiting, bloody stools or diarrhea []


: Denies dysuria or hematuria []


Musculoskeletal: Denies back pain or joint pain []


Integument: Denies rash or skin lesions []


Neurologic: Denies headache, focal weakness or sensory changes []


Endocrine: Denies polyuria or polydipsia []





Current Medications


Current Medications





Current Medications








 Medications


  (Trade)  Dose


 Ordered  Sig/Yisel  Start Time


 Stop Time Status Last Admin


Dose Admin


 


 Heparin Sodium/


 Sodium Chloride  1,000 ml @ 


 As Directed  STK-MED ONCE  17 11:28


 17 11:29 DC  


 


 


 Iodixanol


  (Visipaque 320)  100 ml  STK-MED ONCE  17 11:27


 17 11:28 DC  


 


 


 Lidocaine HCl  20 ml  STK-MED ONCE  17 11:27


 17 11:28 DC  


 


 


 Ondansetron HCl


  (Zofran)  4 mg  PRN Q8HRS  PRN  17 12:30


 17 12:29 UNV  


 











Allergies


Allergies





Allergies








Coded Allergies Type Severity Reaction Last Updated Verified


 


  No Known Drug Allergies    17 No











Physical Exam


Physical Exam





Constitutional: Well developed, well nourished, no acute distress, non-toxic 

appearance. []


HENT: Normocephalic, atraumatic, bilateral external ears normal, oropharynx 

moist, no oral exudates, nose normal. []


Eyes: PERRLA, EOMI, conjunctiva normal, no discharge. [] 


Neck: Normal range of motion, no tenderness, supple, no stridor. [] 


Cardiovascular:Heart rate regular rhythm, no murmur []


Lungs & Thorax:  Bilateral breath sounds clear to auscultation []


Abdomen: Bowel sounds normal, soft, no tenderness, no masses, no pulsatile 

masses. [] 


Skin: Warm, dry, no erythema, no rash. [] 


Back: No tenderness, no CVA tenderness. [] 


Extremities: No tenderness, no cyanosis, no clubbing, ROM intact, no edema. [] 


Neurologic: Alert and oriented X 3, normal motor function, normal sensory 

function, no focal deficits noted. []


Psychologic: Affect normal, judgement normal, mood normal. []





Current Patient Data


Vital Signs





 Vital Signs








  Date Time  Temp Pulse Resp B/P (MAP) Pulse Ox O2 Delivery O2 Flow Rate FiO2


 


17 11:56  83  92/54 (67) 97 Room Air  


 


17 10:32 97.7  20     





 97.7       








Lab Values





 Laboratory Tests








Test


  17


10:32


 


White Blood Count


  8.2 x10^3/uL


(4.0-11.0)


 


Red Blood Count


  4.41 x10^6/uL


(4.30-5.70)


 


Hemoglobin


  12.6 g/dL


(13.0-17.5)  L


 


Hematocrit


  37.8 %


(39.0-53.0)  L


 


Mean Corpuscular Volume


  86 fL ()


 


 


Mean Corpuscular Hemoglobin 29 pg (25-35)  


 


Mean Corpuscular Hemoglobin


Concent 34 g/dL


(31-37)


 


Red Cell Distribution Width


  13.9 %


(11.5-14.5)


 


Platelet Count


  322 x10^3/uL


(140-400)


 


Neutrophils (%) (Auto) 53 % (31-73)  


 


Lymphocytes (%) (Auto) 35 % (24-48)  


 


Monocytes (%) (Auto) 8 % (0-9)  


 


Eosinophils (%) (Auto) 4 % (0-3)  H


 


Basophils (%) (Auto) 1 % (0-3)  


 


Neutrophils # (Auto)


  4.3 x10^3uL


(1.8-7.7)


 


Lymphocytes # (Auto)


  2.8 x10^3/uL


(1.0-4.8)


 


Monocytes # (Auto)


  0.6 x10^3/uL


(0.0-1.1)


 


Eosinophils # (Auto)


  0.3 x10^3/uL


(0.0-0.7)


 


Basophils # (Auto)


  0.1 x10^3/uL


(0.0-0.2)


 


Prothrombin Time


  12.4 SEC


(11.7-14.0)


 


Prothrombin Time INR 1.0 (0.8-1.1)  


 


Sodium Level


  137 mmol/L


(136-145)


 


Potassium Level


  4.0 mmol/L


(3.5-5.1)


 


Chloride Level


  96 mmol/L


()  L


 


Carbon Dioxide Level


  29 mmol/L


(21-32)


 


Anion Gap 12 (6-14)  


 


Blood Urea Nitrogen


  22 mg/dL


(8-26)


 


Creatinine


  2.7 mg/dL


(0.7-1.3)  H


 


Estimated GFR


(Cockcroft-Gault) 24.8  


 


 


Glucose Level


  216 mg/dL


(70-99)  H


 


Calcium Level


  8.8 mg/dL


(8.5-10.1)


 


Magnesium Level


  1.8 mg/dL


(1.8-2.4)


 


Total Bilirubin


  0.2 mg/dL


(0.2-1.0)


 


Direct Bilirubin


  0.1 mg/dL


(0.0-0.2)


 


Aspartate Amino Transferase


(AST) 22 U/L (15-37)


 


 


Alanine Aminotransferase (ALT)


  24 U/L (16-63)


 


 


Alkaline Phosphatase


  103 U/L


()


 


Creatine Kinase


  105 U/L


()


 


Creatine Kinase MB (Mass)


  1.8 ng/mL


(0.0-3.6)


 


Creatine Kinase MB Relative


Index 1.7 % (0-4)  


 


 


Troponin I Quantitative


  < 0.017 ng/mL


(0.000-0.055)


 


NT-Pro-B-Type Natriuretic


Peptide 402 pg/mL


(0-124)  H


 


Total Protein


  8.9 g/dL


(6.4-8.2)  H


 


Albumin


  4.3 g/dL


(3.4-5.0)


 


Lipase


  330 U/L


()





 Laboratory Tests


17 10:32








 Laboratory Tests


17 10:32











EKG


EKG


EKG shows sinus rhythm with a rate of 88 bpm without any ST elevations or T-

wave inversions, and axis deviation noted,  ms, as interpreted by me.





Radiology/Procedures


Radiology/Procedures


 [facility]


 [address] [place] [address]


 [phone]


 


 IMAGING REPORT





 Signed





PATIENT: ARABELLA WASHINGTON ACCOUNT: [id number] MRN#: [id number]


: 1964 LOCATION: ER AGE: 53


SEX: M EXAM DT: 17 ACCESSION#: [id number]


STATUS: PRE ER ORD. PHYSICIAN: ERLIN ALCOCER MD 


REASON: chest pain


PROCEDURE: PORTABLE CHEST 1V








EXAM: Chest, single view.





HISTORY: Chest pain.





COMPARISON: 2017.





FINDINGS: A frontal view of the chest is obtained. There is no infiltrate,


effusion or pneumothorax. There is suspected left basilar atelectasis. The


heart is normal in size for portable technique. There is a right central


venous catheter with the tip in the superior vena cava.





IMPRESSION: No acute pulmonary finding.














DICTATED and SIGNED BY:     SHELLEY GUZMÁN MD


DATE:     17 1101





CC: ERLIN ALCOCER MD; ARIC MATUTE MD ~


Impressions:


Chest pain


End-stage renal disease on hemodialysis


Diabetes





Course & Med Decision Making


Course & Med Decision Making


Pertinent Labs and Imaging studies reviewed. (See chart for details)





EKG is nonacute, spoke with Dr. Mejia who was a take the patient and the 

Cath Lab. Patient is being admitted to Dr. Aric Matute, he is in stable 

condition at this time.





Dragon Disclaimer


Dragon Disclaimer


This electronic medical record was generated, in whole or in part, using a 

voice recognition dictation system.





Departure


Departure


Impression:  


 Primary Impression:  


 Chest pain


Disposition:   ADMITTED AS INPATIENT


Admitting Physician:  Aric Matute


Condition:  STABLE


Referrals:  


ARIC MATUTE MD (PCP)





Problem Qualifiers








 Primary Impression:  


 Chest pain


 Chest pain type:  other chest pain  Qualified Codes:  R07.89 - Other chest pain








ERLIN ALCOCER MD Sep 27, 2017 10:37

## 2017-09-28 NOTE — CARD
--------------- APPROVED REPORT --------------





EXAM: Two-dimensional and M-mode echocardiogram with Doppler and color Doppler.



Other Information 

Quality : Technically Limited

Rhythm : NSRTechnically limited study due to  positioning.



INDICATION

Pre-Op 



2D DIMENSIONS 

Left Atrium(2D)3.2 (1.6-4.0cm)IVSd1.5 (0.7-1.1cm)

Aortic Root(2D)3.2 (2.0-3.7cm)LVDd3.3 (3.9-5.9cm)

LVOT Diameter2.1 (1.8-2.4cm)PWd1.5 (0.7-1.1cm)

LVDs2.1 (2.5-4.0cm)FS (%) 34.5 %

SV30.3 mlLVEF(%)50.0 (>50%)



Aortic Valve

AoV Peak Christophe.110.1cm/sAoV VTI18.7cm

AO Peak GR.4.9mmHgLVOT  VTI 18.33cm

AO Mean GR.3mmHgAVA   (VTI)3.30cm2



Mitral Valve

MV E Clziirsh574.4cm/sMV E Peak Gr.6mmHg

MV DECEL [id number] A Zxttbynz420.1cm/s

MV E Mean Gr.3mmHgMV [id number]

E/A  Ratio0.9MV A [id number]

MVA (PHT)3.24cm2



TDI

Lateral E' P. V8.88cm/sMedial E' P. V7.50cm/s

E/Lateral E'13.1E/Medial E'15.5



Tricuspid Valve

TR P. [id number]/sRAP [id number]

TR Peak Gr.32feVeWWXS74lvGu



 LEFT VENTRICLE 

The left ventricle is normal size. There is moderate concentric left ventricular hypertrophy. Left ve
ntricle systolic function is normal. The Ejection Fraction is 50%. There is normal LV segmental wall 
motion. The left ventricular diastolic function and filling is normal for age.



 RIGHT VENTRICLE 

The right ventricle is normal size. The right ventricular systolic function is normal.



 ATRIA 

The left atrium size is normal. The right atrium size is normal. The interatrial septum is intact wit
h no evidence for an atrial septal defect or patent foramen ovale as noted on 2-D or Doppler imaging.




 AORTIC VALVE 

The aortic valve is moderately sclerotic. The aortic valve is trileaflet. Doppler and Color Flow reve
aled no significant aortic regurgitation. There is no significant aortic valvular stenosis.



 MITRAL VALVE 

Mitral annular calcification is moderate to severe. The mitral valve leaflets are thickened and calci
fied. There is no mitral valve stenosis. Doppler and Color Flow revealed mild mitral regurgitation.



 TRICUSPID VALVE 

The tricuspid valve is not well visualized. Doppler and Color Flow revealed trace tricuspid regurgita
tion. The PA pressure was estimated at 22 mmHg. There is no tricuspid valve stenosis.



 PULMONIC VALVE 

The pulmonic valve is not well visualized. Doppler and Color Flow revealed no pulmonic valvular regur
gitation. There is no pulmonic valvular stenosis.



 GREAT VESSELS 

The aortic root is normal in size. The IVC is normal in size and collapses >50% with inspiration.



 PERICARDIAL EFFUSION 

There is no evidence of significant pericardial effusion.



Critical Notification

Critical Value: No



<Conclusion>

Left ventricle systolic function is normal.

The Ejection Fraction is 50%.

There is moderate concentric left ventricular hypertrophy.

The left atrium size is normal.

The right atrium size is normal.

The aortic valve is moderately sclerotic.

The aortic valve is trileaflet.

Mitral annular calcification is moderate to severe.

The mitral valve leaflets are thickened and calcified.

Doppler and Color Flow revealed  mild mitral regurgitation.

Doppler and Color Flow revealed trace tricuspid regurgitation.

The PA pressure was estimated at 22 mmHg.

The pulmonic valve is not well visualized.

There is no evidence of significant pericardial effusion.

## 2017-09-28 NOTE — HP
ADMIT DATE:  09/27/2017



CHIEF COMPLAINT:  Chest pain.



HISTORY OF PRESENT ILLNESS:  A 53-year-old  male, insulin-dependent

diabetic, on dialysis with a history of peripheral vascular disease who

developed chest pain while he was in the dialysis center.  He was taken to the

cath lab where diffuse coronary artery disease was found with no specific _____

lesion and he was placed on BiPAP with aortic balloon pump in place and Dr.

_____ for surgical consultation.  He has not had chest pain prior to this and

denies any prior history of coronary artery disease.



PAST MEDICAL HISTORY:  He has been on dialysis for maybe 6 months;

insulin-dependent diabetic, pretty good control.  He has chronic nonmalignant

pain from low back pain and previous amputations and diabetic neuropathy.



SOCIAL HISTORY:  , disabled, nonsmoker, nondrinker.



FAMILY HISTORY:  Unremarkable.



REVIEW OF SYSTEMS:  No other complaints.



PHYSICAL EXAMINATION:

ENT:  All within normal limits.

NECK:  No masses, nodes or bruits.

LUNGS:  Clear.

CARDIOVASCULAR:  Regular rate.  No irregular beat or murmur.  Heart tones

distant.

ABDOMEN:  Soft, benign and nontender.

EXTREMITIES:  Pedal pulses diminished.  He has a left below the knee amputation.

NEUROLOGIC:  Physiologic and nonfocal.  Decreased sensation below the knee on

the right side.



ASSESSMENT:  Diffuse coronary artery disease with apparently unstable angina. 

Insulin-dependent diabetes and end-stage renal disease, chronic nonmalignant

pain.



PLAN:  As ordered.

 



______________________________

ARIC MATUTE MD



DR:  ASHLI/tanya  JOB#:  [id number] / [id number]

DD:  09/28/2017 10:36  DT:  09/28/2017 12:15

## 2017-09-28 NOTE — PDOC2
CONSULT


Date of Consult


Date of Consult


DATE: 9/28/17 


TIME: 11:18





Reason for Consult


Reason for Consult:


ESRD





Referring Physician


Referring Physician:


GIOVANI





Identification/Chief Complaint


Chief Complaint


CHEST PAIN


Problems:  





Source


Source:  Chart review, Patient





History of Present Illness


Reason for Visit:


THIS IS A VERY PLEASANT 53 YR OLD ESRD PT ON HD ON MWF. I SAW HIM AT THE 

DIALYSIS UNIT TOWARDS THE END OF HIS TX YESTERDAY. HE STARTED TO HAVE CHEST 

PAIN AND DESCRIBED IT AS AN ELEPHANT SITTING ON HIS CHEST. HE ALSO HAD SOB. HE 

WAS GIVE A HALF LITER SALINE BOLUS. HE RATED HIS PAIN AT A 10/10. HE WAS GIVEN 

SL NTG AND THEN IT WAS A 4/10. HE WAS SENT BY EMS TO THE [facility] ER. HE WENT 

EMERGENT CATH AND WAS NOTED TO HAVE DIFFUSE CAD. CTS EVAL UNDERWAY. HE IS NOW 

IN THE ICU ON A BALLOON PUMP. NO CHEST PAIN AT THIS TIME





Past Medical History


Cardiovascular:  CAD, HTN, Hyperlipidemia, Other (ischemic cardiomyopathy)


Pulmonary:  COPD


GI:  Constipation


Heme/Onc:  Anemia NOS


Renal/:  Chronic renal insuff


Endocrine:  Diabetes, Hyperparathyroidism





Past Surgical History


Past Surgical History


AVF


Past Surgical History:  Other





Family History


Family History:  Diabetes, Hypertension





Social History


ALCOHOL:  none


Lives:  Alone





Current Problem List


Problem List


Problems


Medical Problems:


(1) Chest pain


Status: Acute  











Current Medications


Current Medications





Current Medications


Iodixanol (Visipaque 320) 100 ml STK-MED ONCE .ROUTE ;  Start 9/27/17 at 11:27;

  Stop 9/27/17 at 11:28;  Status DC


Lidocaine HCl 20 ml STK-MED ONCE .ROUTE ;  Start 9/27/17 at 11:27;  Stop 9/27/ 17 at 11:28;  Status DC


Heparin Sodium/ Sodium Chloride 1,000 ml @  As Directed STK-MED ONCE .ROUTE ;  

Start 9/27/17 at 11:28;  Stop 9/27/17 at 11:29;  Status DC


Ondansetron HCl (Zofran) 4 mg PRN Q8HRS  PRN IV NAUSEA/VOMITING;  Start 9/27/17 

at 12:30;  Stop 9/28/17 at 12:29


Fentanyl Citrate (Fentanyl 2ml Vial) 100 mcg STK-MED ONCE .ROUTE ;  Start 9/27/ 17 at 12:33;  Stop 9/27/17 at 12:34;  Status DC


Midazolam HCl (Versed) 5 mg STK-MED ONCE .ROUTE ;  Start 9/27/17 at 12:33;  

Stop 9/27/17 at 12:34;  Status DC


Heparin Sodium (Porcine) (Heparin Sodium) 10,000 unit STK-MED ONCE .ROUTE ;  

Start 9/27/17 at 12:59;  Stop 9/27/17 at 13:00;  Status DC


Heparin Sodium/ Dextrose 500 ml @  As Directed STK-MED ONCE IV ;  Start 9/27/17 

at 13:21;  Stop 9/27/17 at 13:22;  Status DC


Heparin Sodium/ Sodium Chloride 1,000 unit 1X  ONCE IART  Last administered on 9 /27/17at 13:40;  Start 9/27/17 at 13:00;  Stop 9/27/17 at 13:47;  Status DC


Midazolam HCl (Versed) 5 mg 1X  ONCE IV  Last administered on 9/27/17at 13:41;  

Start 9/27/17 at 13:00;  Stop 9/27/17 at 13:47;  Status DC


Fentanyl Citrate (Fentanyl 2ml Vial) 100 mcg 1X  ONCE IV  Last administered on 9 /27/17at 13:42;  Start 9/27/17 at 13:00;  Stop 9/27/17 at 13:47;  Status DC


Iodixanol (Visipaque 320) 100 ml 1X  ONCE IART  Last administered on 9/27/17at 

13:40;  Start 9/27/17 at 13:00;  Stop 9/27/17 at 13:47;  Status DC


Heparin Sodium (Porcine) (Heparin Sodium) 8,000 unit 1X  ONCE IV  Last 

administered on 9/27/17at 13:43;  Start 9/27/17 at 13:00;  Stop 9/27/17 at 13:47

;  Status DC


Lidocaine HCl 20 ml 1X  ONCE IJ  Last administered on 9/27/17at 13:40;  Start 9/ 27/17 at 13:00;  Stop 9/27/17 at 13:47;  Status DC


Heparin Sodium (Porcine) (Heparin 5,000 Units/1,000ml NS) 5,000 unit 1X  ONCE 

IV  Last administered on 9/27/17at 13:00;  Start 9/27/17 at 13:00;  Stop 9/27/ 17 at 13:47;  Status DC


Heparin Sodium/ Dextrose 500 ml @  20 mls/hr CONT PRN  PRN IV SEE COMMENTS Last 

administered on 9/27/17at 13:45;  Start 9/27/17 at 13:00;  Stop 9/28/17 at 09:04

;  Status DC


Oxycodone/ Acetaminophen (Percocet 10/325) 1 tab PRN Q6HRS  PRN PO PAIN Last 

administered on 9/28/17at 06:53;  Start 9/27/17 at 16:45


Cyclobenzaprine HCl (Flexeril) 10 mg PRN Q6HRS  PRN PO MUSCLE SPASMS Last 

administered on 9/28/17at 06:53;  Start 9/27/17 at 16:45


Diazepam (Valium) 5 mg PRN Q6HRS  PRN PO ANXIETY Last administered on 9/28/17at 

06:53;  Start 9/27/17 at 16:45


Polyethylene Glycol (miraLAX PACKET) 17 gm DAILY PO ;  Start 9/28/17 at 09:00;  

Stop 9/28/17 at 09:00;  Status DC


Polyethylene Glycol (miraLAX PACKET) 17 gm DAILY PO  Last administered on 9/28/ 17at 08:15;  Start 9/27/17 at 21:00


Fentanyl Citrate (Fentanyl 2ml Vial) 50 mcg PRN Q3HRS  PRN IV SEVERE PAIN Last 

administered on 9/28/17at 11:00;  Start 9/28/17 at 01:45


Zolpidem Tartrate (Ambien) 5 mg PRN QHS  PRN PO INSOMNIA, MAY REPEAT IN 1HR;  

Start 9/28/17 at 09:30


Cefazolin Sodium/ Dextrose 50 ml @  100 mls/hr 1X  ONCE IV ;  Start 10/3/17 at 

06:00;  Stop 10/3/17 at 06:29


Amlodipine Besylate (Norvasc) 10 mg DAILY PO  Last administered on 9/28/17at 10:

59;  Start 9/28/17 at 11:00


Aspirin (Myles Aspirin) 325 mg DAILY PO ;  Start 9/28/17 at 11:00;  Stop 9/28/ 17 at 11:00;  Status DC


Clopidogrel Bisulfate (Plavix) 75 mg DAILY PO ;  Start 9/28/17 at 11:00;  Stop 9 /28/17 at 11:00;  Status DC


Isosorbide Mononitrate (Imdur) 30 mg DAILY PO  Last administered on 9/28/17at 10

:58;  Start 9/28/17 at 11:00


Losartan Potassium (Cozaar) 100 mg DAILY08 PO ;  Start 9/28/17 at 11:00;  Stop 9 /28/17 at 11:00;  Status DC


Oxycodone/ Acetaminophen (Percocet 10/325) 1 tab QID PO ;  Start 9/28/17 at 13:

00


Ranolazine (Ranexa) 500 mg BID PO  Last administered on 9/28/17at 10:59;  Start 

9/28/17 at 11:00


Sodium Bicarbonate (Sodium Bicarbonate) 650 mg TID PO ;  Start 9/28/17 at 14:00


Oxycodone/ Acetaminophen (Percocet 10/325) 1 tab PRN Q6HRS  PRN PO PAIN;  Start 

9/28/17 at 10:30





Active Scripts


Active


Sodium Bicarbonate 650 Mg Tablet 650 Mg PO TID


Cozaar (Losartan Potassium) 50 Mg Tablet 100 Mg PO DAILY08


Gabapentin 300 Mg Capsule 300 Mg PO DAILY


Reported


Isosorbide Mononitrate 20 Mg Tablet 60 Mg PO DAILY


Clopidogrel (Clopidogrel Bisulfate) 75 Mg Tablet 1 Tab PO DAILY


Aspirin 325 Mg Tablet 1 Tab PO DAILY


Ranexa (Ranolazine) 500 Mg Tab.er.12h 1 Tab PO BID


Humulin N Kwikpen (Nph, Human Insulin Isophane) 100 Unit/1 Ml Insuln.pen 40 

Unit SQ BIDAC


Percocet  Mg Tablet (Oxycodone/Acetaminophen) 1 Each Tablet 1 Tab PO QID


Amlodipine Besylate 10 Mg Tablet 10 Mg PO DAILY


Ventolin Hfa Inhaler (Albuterol Sulfate) 18 Gm Hfa.aer.ad 2 Puff INH Q4HRS PRN


Carvedilol 25 Mg Tablet 25 Mg PO BID





Allergies


Allergies:  


Coded Allergies:  


     No Known Drug Allergies (Unverified , 7/13/17)





ROS


General:  YES: Fatigue, Malaise


PSYCHOLOGICAL ROS:  YES: Anxiety, Depression


Eyes:  Yes Decreased vision


HEENT:  YES: Jessica


ALLERGY AND IMMUNOLOGY:  YES: Nasal Congestion, Seasonal Allergies


Hematological and Lymphatic:  YES: Brusing


Respiratory:  YES: Cough, Shortness of breath


Cardiovascular:  yes Chest Pain, yes Lt Headedness


Gastrointestinal:  Yes Nausea, Yes Constipation


Genitourinary:  YES Other (ANURIA)


Musculoskeletal:  Yes Muscular Weakness


Skin:  Yes Dry Skin, Yes Eczema





Physical Exam


General:  Alert, Oriented X3, Cooperative, No acute distress


HEENT:  Atraumatic, PERRLA


Lungs:  Clear to auscultation


Heart:  Regular rate, Normal S1, Normal S2


Abdomen:  Normal bowel sounds, Soft, No tenderness, No hepatosplenomegaly, No 

masses


Extremities:  No clubbing, No cyanosis, No edema, Other (AVF HAS A GOOD THRILL 

AND BRUIT)


Skin:  No rashes, No breakdown, No significant lesion


Neuro:  Normal speech, Strength at 5/5 X4 ext, Cranial nerves 3-12 NL


Psych/Mental Status:  Mental status NL, Mood NL


MUSCULOSKELETAL:  No deformity, No swelling





Vitals


VITALS





Vital Signs








  Date Time  Temp Pulse Resp B/P (MAP) Pulse Ox O2 Delivery O2 Flow Rate FiO2


 


9/28/17 11:00     100 Room Air  


 


9/28/17 10:59  92  140/77    


 


9/28/17 07:00   20     


 


9/28/17 04:00 97.5       





 97.5       


 


9/27/17 13:42       2.0 











Labs


Labs





Laboratory Tests








Test


  9/27/17


10:32 9/27/17


17:00 9/27/17


18:35 9/27/17


20:15


 


White Blood Count


  8.2 x10^3/uL


(4.0-11.0) 


  


  


 


 


Red Blood Count


  4.41 x10^6/uL


(4.30-5.70) 


  


  


 


 


Hemoglobin


  12.6 g/dL


(13.0-17.5) 


  


  


 


 


Hematocrit


  37.8 %


(39.0-53.0) 


  


  


 


 


Mean Corpuscular Volume 86 fL ()    


 


Mean Corpuscular Hemoglobin 29 pg (25-35)    


 


Mean Corpuscular Hemoglobin


Concent 34 g/dL


(31-37) 


  


  


 


 


Red Cell Distribution Width


  13.9 %


(11.5-14.5) 


  


  


 


 


Platelet Count


  322 x10^3/uL


(140-400) 


  


  


 


 


Neutrophils (%) (Auto) 53 % (31-73)    


 


Lymphocytes (%) (Auto) 35 % (24-48)    


 


Monocytes (%) (Auto) 8 % (0-9)    


 


Eosinophils (%) (Auto) 4 % (0-3)    


 


Basophils (%) (Auto) 1 % (0-3)    


 


Neutrophils # (Auto)


  4.3 x10^3uL


(1.8-7.7) 


  


  


 


 


Lymphocytes # (Auto)


  2.8 x10^3/uL


(1.0-4.8) 


  


  


 


 


Monocytes # (Auto)


  0.6 x10^3/uL


(0.0-1.1) 


  


  


 


 


Eosinophils # (Auto)


  0.3 x10^3/uL


(0.0-0.7) 


  


  


 


 


Basophils # (Auto)


  0.1 x10^3/uL


(0.0-0.2) 


  


  


 


 


Prothrombin Time


  12.4 SEC


(11.7-14.0) 


  


  


 


 


Prothromb Time International


Ratio 1.0 (0.8-1.1) 


  


  


  


 


 


Sodium Level


  137 mmol/L


(136-145) 


  


  


 


 


Potassium Level


  4.0 mmol/L


(3.5-5.1) 


  


  


 


 


Chloride Level


  96 mmol/L


() 


  


  


 


 


Carbon Dioxide Level


  29 mmol/L


(21-32) 


  


  


 


 


Anion Gap 12 (6-14)    


 


Blood Urea Nitrogen


  22 mg/dL


(8-26) 


  


  


 


 


Creatinine


  2.7 mg/dL


(0.7-1.3) 


  


  


 


 


Estimated GFR


(Cockcroft-Gault) 24.8 


  


  


  


 


 


Glucose Level


  216 mg/dL


(70-99) 


  


  


 


 


Calcium Level


  8.8 mg/dL


(8.5-10.1) 


  


  


 


 


Magnesium Level


  1.8 mg/dL


(1.8-2.4) 


  


  


 


 


Total Bilirubin


  0.2 mg/dL


(0.2-1.0) 


  


  


 


 


Direct Bilirubin


  0.1 mg/dL


(0.0-0.2) 


  


  


 


 


Aspartate Amino Transf


(AST/SGOT) 22 U/L (15-37) 


  


  


  


 


 


Alanine Aminotransferase


(ALT/SGPT) 24 U/L (16-63) 


  


  


  


 


 


Alkaline Phosphatase


  103 U/L


() 


  


  


 


 


Creatine Kinase


  105 U/L


() 


  


  


 


 


Creatine Kinase MB (Mass)


  1.8 ng/mL


(0.0-3.6) 


  


  


 


 


Creatine Kinase MB Relative


Index 1.7 % (0-4) 


  


  


  


 


 


Troponin I Quantitative


  < 0.017 ng/mL


(0.000-0.055) 


  < 0.017 ng/mL


(0.000-0.055) 


 


 


NT-Pro-B-Type Natriuretic


Peptide 402 pg/mL


(0-124) 


  


  


 


 


Total Protein


  8.9 g/dL


(6.4-8.2) 


  


  


 


 


Albumin


  4.3 g/dL


(3.4-5.0) 


  


  


 


 


Lipase


  330 U/L


() 


  


  


 


 


Nasal Screen MRSA (PCR)


  


  Negative


(Negative) 


  


 


 


Heparin Anti-Xa Act,


Unfractionated 


  


  


  0.15 IU/mL


(0.30-0.70)


 


Test


  9/28/17


00:35 9/28/17


05:20 


  


 


 


Troponin I Quantitative


  < 0.017 ng/mL


(0.000-0.055) 


  


  


 


 


White Blood Count


  


  10.1 x10^3/uL


(4.0-11.0) 


  


 


 


Red Blood Count


  


  4.07 x10^6/uL


(4.30-5.70) 


  


 


 


Hemoglobin


  


  11.4 g/dL


(13.0-17.5) 


  


 


 


Hematocrit


  


  34.9 %


(39.0-53.0) 


  


 


 


Mean Corpuscular Volume  86 fL ()   


 


Mean Corpuscular Hemoglobin  28 pg (25-35)   


 


Mean Corpuscular Hemoglobin


Concent 


  33 g/dL


(31-37) 


  


 


 


Red Cell Distribution Width


  


  14.0 %


(11.5-14.5) 


  


 


 


Platelet Count


  


  274 x10^3/uL


(140-400) 


  


 


 


Neutrophils (%) (Auto)  56 % (31-73)   


 


Lymphocytes (%) (Auto)  32 % (24-48)   


 


Monocytes (%) (Auto)  9 % (0-9)   


 


Eosinophils (%) (Auto)  2 % (0-3)   


 


Basophils (%) (Auto)  1 % (0-3)   


 


Neutrophils # (Auto)


  


  5.7 x10^3uL


(1.8-7.7) 


  


 


 


Lymphocytes # (Auto)


  


  3.3 x10^3/uL


(1.0-4.8) 


  


 


 


Monocytes # (Auto)


  


  0.9 x10^3/uL


(0.0-1.1) 


  


 


 


Eosinophils # (Auto)


  


  0.2 x10^3/uL


(0.0-0.7) 


  


 


 


Basophils # (Auto)


  


  0.1 x10^3/uL


(0.0-0.2) 


  


 


 


Sodium Level


  


  134 mmol/L


(136-145) 


  


 


 


Potassium Level


  


  4.6 mmol/L


(3.5-5.1) 


  


 


 


Chloride Level


  


  96 mmol/L


() 


  


 


 


Carbon Dioxide Level


  


  26 mmol/L


(21-32) 


  


 


 


Anion Gap  12 (6-14)   


 


Blood Urea Nitrogen


  


  36 mg/dL


(8-26) 


  


 


 


Creatinine


  


  4.5 mg/dL


(0.7-1.3) 


  


 


 


Estimated GFR


(Cockcroft-Gault) 


  13.8 


  


  


 


 


Glucose Level


  


  295 mg/dL


(70-99) 


  


 


 


Calcium Level


  


  8.8 mg/dL


(8.5-10.1) 


  


 








Laboratory Tests








Test


  9/27/17


17:00 9/27/17


18:35 9/27/17


20:15 9/28/17


00:35


 


Nasal Screen MRSA (PCR)


  Negative


(Negative) 


  


  


 


 


Troponin I Quantitative


  


  < 0.017 ng/mL


(0.000-0.055) 


  < 0.017 ng/mL


(0.000-0.055)


 


Heparin Anti-Xa Act,


Unfractionated 


  


  0.15 IU/mL


(0.30-0.70) 


 


 


Test


  9/28/17


05:20 


  


  


 


 


White Blood Count


  10.1 x10^3/uL


(4.0-11.0) 


  


  


 


 


Red Blood Count


  4.07 x10^6/uL


(4.30-5.70) 


  


  


 


 


Hemoglobin


  11.4 g/dL


(13.0-17.5) 


  


  


 


 


Hematocrit


  34.9 %


(39.0-53.0) 


  


  


 


 


Mean Corpuscular Volume 86 fL ()    


 


Mean Corpuscular Hemoglobin 28 pg (25-35)    


 


Mean Corpuscular Hemoglobin


Concent 33 g/dL


(31-37) 


  


  


 


 


Red Cell Distribution Width


  14.0 %


(11.5-14.5) 


  


  


 


 


Platelet Count


  274 x10^3/uL


(140-400) 


  


  


 


 


Neutrophils (%) (Auto) 56 % (31-73)    


 


Lymphocytes (%) (Auto) 32 % (24-48)    


 


Monocytes (%) (Auto) 9 % (0-9)    


 


Eosinophils (%) (Auto) 2 % (0-3)    


 


Basophils (%) (Auto) 1 % (0-3)    


 


Neutrophils # (Auto)


  5.7 x10^3uL


(1.8-7.7) 


  


  


 


 


Lymphocytes # (Auto)


  3.3 x10^3/uL


(1.0-4.8) 


  


  


 


 


Monocytes # (Auto)


  0.9 x10^3/uL


(0.0-1.1) 


  


  


 


 


Eosinophils # (Auto)


  0.2 x10^3/uL


(0.0-0.7) 


  


  


 


 


Basophils # (Auto)


  0.1 x10^3/uL


(0.0-0.2) 


  


  


 


 


Sodium Level


  134 mmol/L


(136-145) 


  


  


 


 


Potassium Level


  4.6 mmol/L


(3.5-5.1) 


  


  


 


 


Chloride Level


  96 mmol/L


() 


  


  


 


 


Carbon Dioxide Level


  26 mmol/L


(21-32) 


  


  


 


 


Anion Gap 12 (6-14)    


 


Blood Urea Nitrogen


  36 mg/dL


(8-26) 


  


  


 


 


Creatinine


  4.5 mg/dL


(0.7-1.3) 


  


  


 


 


Estimated GFR


(Cockcroft-Gault) 13.8 


  


  


  


 


 


Glucose Level


  295 mg/dL


(70-99) 


  


  


 


 


Calcium Level


  8.8 mg/dL


(8.5-10.1) 


  


  


 











Assessment/Plan


Assessment/Plan


IMP





SEVERE CAD


CHEST PAIN


ANEMIA


ESRD


DM II


HTN





PLAN





CTS EVALUATION


PROB NEEDS CABG


CONT HOME MEDS


HD TOMORROW


RACHEAL MATTHEWS MD Sep 28, 2017 11:24

## 2017-09-28 NOTE — PDOC4
PROCEDURE


Procedure


Seizure note





Patient is in ICU appears to be hemodynamically stable.





The intra-aortic balloon pump was functioning normally.





At this point I felt that the patient did not meet the intra-aortic balloon 

pump anymore.





I removed the intra-aortic balloon pump and held pressure for 20 minutes after 

which her appeared to be no active bleeding a dressing was applied to the area.





This was done in the ICU at the bedside.





Patient tolerated the procedure rather well.





He was free of chest pain at the end of the procedure.











DAMIAN ONEAL MD Sep 28, 2017 15:47

## 2017-09-28 NOTE — PDOC
PROGRESS NOTES


Subjective


Subjective


No chest pains





Objective


Objective





Vital Signs








  Date Time  Temp Pulse Resp B/P (MAP) Pulse Ox O2 Delivery O2 Flow Rate FiO2


 


9/28/17 15:08     99 Room Air  


 


9/28/17 14:00  98 19 115/51 (72)    


 


9/28/17 12:00 97.8       





 97.8       


 


9/27/17 13:42       2.0 














Intake and Output 


 


 9/29/17





 07:00


 


Intake Total 450 ml


 


Output Total 420 ml


 


Balance 30 ml


 


 


 


Intake Oral 450 ml


 


Output Urine Total 420 ml











Physical Exam


Physical Exam


No significant changes in cardiac exam





Assessment


Assessment


Patient with severe coronary artery disease and unstable angina.





Presently he is angina is controlled medically.





Intra-aortic balloon pump DC'd.





Patient going to surgery early next week.





Problems


Medical Problems:


(1) Chest pain


Status: Acute  








Comment


Review of Relevant


I have reviewed the following items juan (where applicable) has been applied.


Labs





Laboratory Tests








Test


  9/27/17


10:32 9/27/17


17:00 9/27/17


18:35 9/27/17


20:15


 


White Blood Count


  8.2 x10^3/uL


(4.0-11.0) 


  


  


 


 


Red Blood Count


  4.41 x10^6/uL


(4.30-5.70) 


  


  


 


 


Hemoglobin


  12.6 g/dL


(13.0-17.5) 


  


  


 


 


Hematocrit


  37.8 %


(39.0-53.0) 


  


  


 


 


Mean Corpuscular Volume 86 fL ()    


 


Mean Corpuscular Hemoglobin 29 pg (25-35)    


 


Mean Corpuscular Hemoglobin


Concent 34 g/dL


(31-37) 


  


  


 


 


Red Cell Distribution Width


  13.9 %


(11.5-14.5) 


  


  


 


 


Platelet Count


  322 x10^3/uL


(140-400) 


  


  


 


 


Neutrophils (%) (Auto) 53 % (31-73)    


 


Lymphocytes (%) (Auto) 35 % (24-48)    


 


Monocytes (%) (Auto) 8 % (0-9)    


 


Eosinophils (%) (Auto) 4 % (0-3)    


 


Basophils (%) (Auto) 1 % (0-3)    


 


Neutrophils # (Auto)


  4.3 x10^3uL


(1.8-7.7) 


  


  


 


 


Lymphocytes # (Auto)


  2.8 x10^3/uL


(1.0-4.8) 


  


  


 


 


Monocytes # (Auto)


  0.6 x10^3/uL


(0.0-1.1) 


  


  


 


 


Eosinophils # (Auto)


  0.3 x10^3/uL


(0.0-0.7) 


  


  


 


 


Basophils # (Auto)


  0.1 x10^3/uL


(0.0-0.2) 


  


  


 


 


Prothrombin Time


  12.4 SEC


(11.7-14.0) 


  


  


 


 


Prothromb Time International


Ratio 1.0 (0.8-1.1) 


  


  


  


 


 


Sodium Level


  137 mmol/L


(136-145) 


  


  


 


 


Potassium Level


  4.0 mmol/L


(3.5-5.1) 


  


  


 


 


Chloride Level


  96 mmol/L


() 


  


  


 


 


Carbon Dioxide Level


  29 mmol/L


(21-32) 


  


  


 


 


Anion Gap 12 (6-14)    


 


Blood Urea Nitrogen


  22 mg/dL


(8-26) 


  


  


 


 


Creatinine


  2.7 mg/dL


(0.7-1.3) 


  


  


 


 


Estimated GFR


(Cockcroft-Gault) 24.8 


  


  


  


 


 


Glucose Level


  216 mg/dL


(70-99) 


  


  


 


 


Calcium Level


  8.8 mg/dL


(8.5-10.1) 


  


  


 


 


Magnesium Level


  1.8 mg/dL


(1.8-2.4) 


  


  


 


 


Total Bilirubin


  0.2 mg/dL


(0.2-1.0) 


  


  


 


 


Direct Bilirubin


  0.1 mg/dL


(0.0-0.2) 


  


  


 


 


Aspartate Amino Transf


(AST/SGOT) 22 U/L (15-37) 


  


  


  


 


 


Alanine Aminotransferase


(ALT/SGPT) 24 U/L (16-63) 


  


  


  


 


 


Alkaline Phosphatase


  103 U/L


() 


  


  


 


 


Creatine Kinase


  105 U/L


() 


  


  


 


 


Creatine Kinase MB (Mass)


  1.8 ng/mL


(0.0-3.6) 


  


  


 


 


Creatine Kinase MB Relative


Index 1.7 % (0-4) 


  


  


  


 


 


Troponin I Quantitative


  < 0.017 ng/mL


(0.000-0.055) 


  < 0.017 ng/mL


(0.000-0.055) 


 


 


NT-Pro-B-Type Natriuretic


Peptide 402 pg/mL


(0-124) 


  


  


 


 


Total Protein


  8.9 g/dL


(6.4-8.2) 


  


  


 


 


Albumin


  4.3 g/dL


(3.4-5.0) 


  


  


 


 


Lipase


  330 U/L


() 


  


  


 


 


Nasal Screen MRSA (PCR)


  


  Negative


(Negative) 


  


 


 


Heparin Anti-Xa Act,


Unfractionated 


  


  


  0.15 IU/mL


(0.30-0.70)


 


Test


  9/28/17


00:35 9/28/17


05:20 9/28/17


12:38 


 


 


Troponin I Quantitative


  < 0.017 ng/mL


(0.000-0.055) 


  


  


 


 


White Blood Count


  


  10.1 x10^3/uL


(4.0-11.0) 


  


 


 


Red Blood Count


  


  4.07 x10^6/uL


(4.30-5.70) 


  


 


 


Hemoglobin


  


  11.4 g/dL


(13.0-17.5) 


  


 


 


Hematocrit


  


  34.9 %


(39.0-53.0) 


  


 


 


Mean Corpuscular Volume  86 fL ()   


 


Mean Corpuscular Hemoglobin  28 pg (25-35)   


 


Mean Corpuscular Hemoglobin


Concent 


  33 g/dL


(31-37) 


  


 


 


Red Cell Distribution Width


  


  14.0 %


(11.5-14.5) 


  


 


 


Platelet Count


  


  274 x10^3/uL


(140-400) 


  


 


 


Neutrophils (%) (Auto)  56 % (31-73)   


 


Lymphocytes (%) (Auto)  32 % (24-48)   


 


Monocytes (%) (Auto)  9 % (0-9)   


 


Eosinophils (%) (Auto)  2 % (0-3)   


 


Basophils (%) (Auto)  1 % (0-3)   


 


Neutrophils # (Auto)


  


  5.7 x10^3uL


(1.8-7.7) 


  


 


 


Lymphocytes # (Auto)


  


  3.3 x10^3/uL


(1.0-4.8) 


  


 


 


Monocytes # (Auto)


  


  0.9 x10^3/uL


(0.0-1.1) 


  


 


 


Eosinophils # (Auto)


  


  0.2 x10^3/uL


(0.0-0.7) 


  


 


 


Basophils # (Auto)


  


  0.1 x10^3/uL


(0.0-0.2) 


  


 


 


Sodium Level


  


  134 mmol/L


(136-145) 


  


 


 


Potassium Level


  


  4.6 mmol/L


(3.5-5.1) 


  


 


 


Chloride Level


  


  96 mmol/L


() 


  


 


 


Carbon Dioxide Level


  


  26 mmol/L


(21-32) 


  


 


 


Anion Gap  12 (6-14)   


 


Blood Urea Nitrogen


  


  36 mg/dL


(8-26) 


  


 


 


Creatinine


  


  4.5 mg/dL


(0.7-1.3) 


  


 


 


Estimated GFR


(Cockcroft-Gault) 


  13.8 


  


  


 


 


Glucose Level


  


  295 mg/dL


(70-99) 


  


 


 


Calcium Level


  


  8.8 mg/dL


(8.5-10.1) 


  


 


 


Glucose (Fingerstick)


  


  


  333 mg/dL


(70-99) 


 








Laboratory Tests








Test


  9/27/17


17:00 9/27/17


18:35 9/27/17


20:15 9/28/17


00:35


 


Nasal Screen MRSA (PCR)


  Negative


(Negative) 


  


  


 


 


Troponin I Quantitative


  


  < 0.017 ng/mL


(0.000-0.055) 


  < 0.017 ng/mL


(0.000-0.055)


 


Heparin Anti-Xa Act,


Unfractionated 


  


  0.15 IU/mL


(0.30-0.70) 


 


 


Test


  9/28/17


05:20 9/28/17


12:38 


  


 


 


White Blood Count


  10.1 x10^3/uL


(4.0-11.0) 


  


  


 


 


Red Blood Count


  4.07 x10^6/uL


(4.30-5.70) 


  


  


 


 


Hemoglobin


  11.4 g/dL


(13.0-17.5) 


  


  


 


 


Hematocrit


  34.9 %


(39.0-53.0) 


  


  


 


 


Mean Corpuscular Volume 86 fL ()    


 


Mean Corpuscular Hemoglobin 28 pg (25-35)    


 


Mean Corpuscular Hemoglobin


Concent 33 g/dL


(31-37) 


  


  


 


 


Red Cell Distribution Width


  14.0 %


(11.5-14.5) 


  


  


 


 


Platelet Count


  274 x10^3/uL


(140-400) 


  


  


 


 


Neutrophils (%) (Auto) 56 % (31-73)    


 


Lymphocytes (%) (Auto) 32 % (24-48)    


 


Monocytes (%) (Auto) 9 % (0-9)    


 


Eosinophils (%) (Auto) 2 % (0-3)    


 


Basophils (%) (Auto) 1 % (0-3)    


 


Neutrophils # (Auto)


  5.7 x10^3uL


(1.8-7.7) 


  


  


 


 


Lymphocytes # (Auto)


  3.3 x10^3/uL


(1.0-4.8) 


  


  


 


 


Monocytes # (Auto)


  0.9 x10^3/uL


(0.0-1.1) 


  


  


 


 


Eosinophils # (Auto)


  0.2 x10^3/uL


(0.0-0.7) 


  


  


 


 


Basophils # (Auto)


  0.1 x10^3/uL


(0.0-0.2) 


  


  


 


 


Sodium Level


  134 mmol/L


(136-145) 


  


  


 


 


Potassium Level


  4.6 mmol/L


(3.5-5.1) 


  


  


 


 


Chloride Level


  96 mmol/L


() 


  


  


 


 


Carbon Dioxide Level


  26 mmol/L


(21-32) 


  


  


 


 


Anion Gap 12 (6-14)    


 


Blood Urea Nitrogen


  36 mg/dL


(8-26) 


  


  


 


 


Creatinine


  4.5 mg/dL


(0.7-1.3) 


  


  


 


 


Estimated GFR


(Cockcroft-Gault) 13.8 


  


  


  


 


 


Glucose Level


  295 mg/dL


(70-99) 


  


  


 


 


Calcium Level


  8.8 mg/dL


(8.5-10.1) 


  


  


 


 


Glucose (Fingerstick)


  


  333 mg/dL


(70-99) 


  


 








Medications





Current Medications


Iodixanol (Visipaque 320) 100 ml STK-MED ONCE .ROUTE ;  Start 9/27/17 at 11:27;

  Stop 9/27/17 at 11:28;  Status DC


Lidocaine HCl 20 ml STK-MED ONCE .ROUTE ;  Start 9/27/17 at 11:27;  Stop 9/27/ 17 at 11:28;  Status DC


Heparin Sodium/ Sodium Chloride 1,000 ml @  As Directed STK-MED ONCE .ROUTE ;  

Start 9/27/17 at 11:28;  Stop 9/27/17 at 11:29;  Status DC


Ondansetron HCl (Zofran) 4 mg PRN Q8HRS  PRN IV NAUSEA/VOMITING;  Start 9/27/17 

at 12:30;  Stop 9/28/17 at 12:29;  Status DC


Fentanyl Citrate (Fentanyl 2ml Vial) 100 mcg STK-MED ONCE .ROUTE ;  Start 9/27/ 17 at 12:33;  Stop 9/27/17 at 12:34;  Status DC


Midazolam HCl (Versed) 5 mg STK-MED ONCE .ROUTE ;  Start 9/27/17 at 12:33;  

Stop 9/27/17 at 12:34;  Status DC


Heparin Sodium (Porcine) (Heparin Sodium) 10,000 unit STK-MED ONCE .ROUTE ;  

Start 9/27/17 at 12:59;  Stop 9/27/17 at 13:00;  Status DC


Heparin Sodium/ Dextrose 500 ml @  As Directed STK-MED ONCE IV ;  Start 9/27/17 

at 13:21;  Stop 9/27/17 at 13:22;  Status DC


Heparin Sodium/ Sodium Chloride 1,000 unit 1X  ONCE IART  Last administered on 9 /27/17at 13:40;  Start 9/27/17 at 13:00;  Stop 9/27/17 at 13:47;  Status DC


Midazolam HCl (Versed) 5 mg 1X  ONCE IV  Last administered on 9/27/17at 13:41;  

Start 9/27/17 at 13:00;  Stop 9/27/17 at 13:47;  Status DC


Fentanyl Citrate (Fentanyl 2ml Vial) 100 mcg 1X  ONCE IV  Last administered on 9 /27/17at 13:42;  Start 9/27/17 at 13:00;  Stop 9/27/17 at 13:47;  Status DC


Iodixanol (Visipaque 320) 100 ml 1X  ONCE IART  Last administered on 9/27/17at 

13:40;  Start 9/27/17 at 13:00;  Stop 9/27/17 at 13:47;  Status DC


Heparin Sodium (Porcine) (Heparin Sodium) 8,000 unit 1X  ONCE IV  Last 

administered on 9/27/17at 13:43;  Start 9/27/17 at 13:00;  Stop 9/27/17 at 13:47

;  Status DC


Lidocaine HCl 20 ml 1X  ONCE IJ  Last administered on 9/27/17at 13:40;  Start 9/ 27/17 at 13:00;  Stop 9/27/17 at 13:47;  Status DC


Heparin Sodium (Porcine) (Heparin 5,000 Units/1,000ml NS) 5,000 unit 1X  ONCE 

IV  Last administered on 9/27/17at 13:00;  Start 9/27/17 at 13:00;  Stop 9/27/ 17 at 13:47;  Status DC


Heparin Sodium/ Dextrose 500 ml @  20 mls/hr CONT PRN  PRN IV SEE COMMENTS Last 

administered on 9/27/17at 13:45;  Start 9/27/17 at 13:00;  Stop 9/28/17 at 09:04

;  Status DC


Oxycodone/ Acetaminophen (Percocet 10/325) 1 tab PRN Q6HRS  PRN PO PAIN Last 

administered on 9/28/17at 14:08;  Start 9/27/17 at 16:45


Cyclobenzaprine HCl (Flexeril) 10 mg PRN Q6HRS  PRN PO MUSCLE SPASMS Last 

administered on 9/28/17at 14:07;  Start 9/27/17 at 16:45


Diazepam (Valium) 5 mg PRN Q6HRS  PRN PO ANXIETY Last administered on 9/28/17at 

14:07;  Start 9/27/17 at 16:45


Polyethylene Glycol (miraLAX PACKET) 17 gm DAILY PO ;  Start 9/28/17 at 09:00;  

Stop 9/28/17 at 09:00;  Status DC


Polyethylene Glycol (miraLAX PACKET) 17 gm DAILY PO  Last administered on 9/28/ 17at 08:15;  Start 9/27/17 at 21:00


Fentanyl Citrate (Fentanyl 2ml Vial) 50 mcg PRN Q3HRS  PRN IV SEVERE PAIN Last 

administered on 9/28/17at 11:00;  Start 9/28/17 at 01:45


Zolpidem Tartrate (Ambien) 5 mg PRN QHS  PRN PO INSOMNIA, MAY REPEAT IN 1HR;  

Start 9/28/17 at 09:30


Cefazolin Sodium/ Dextrose 50 ml @  100 mls/hr 1X  ONCE IV ;  Start 10/3/17 at 

06:00;  Stop 10/3/17 at 06:29


Amlodipine Besylate (Norvasc) 10 mg DAILY PO  Last administered on 9/28/17at 10:

59;  Start 9/28/17 at 11:00


Aspirin (Myles Aspirin) 325 mg DAILY PO ;  Start 9/28/17 at 11:00;  Stop 9/28/ 17 at 11:00;  Status DC


Clopidogrel Bisulfate (Plavix) 75 mg DAILY PO ;  Start 9/28/17 at 11:00;  Stop 9 /28/17 at 11:00;  Status DC


Isosorbide Mononitrate (Imdur) 30 mg DAILY PO  Last administered on 9/28/17at 10

:58;  Start 9/28/17 at 11:00


Losartan Potassium (Cozaar) 100 mg DAILY08 PO ;  Start 9/28/17 at 11:00;  Stop 9 /28/17 at 11:00;  Status DC


Oxycodone/ Acetaminophen (Percocet 10/325) 1 tab QID PO  Last administered on 9/ 28/17at 12:24;  Start 9/28/17 at 13:00


Ranolazine (Ranexa) 500 mg BID PO  Last administered on 9/28/17at 10:59;  Start 

9/28/17 at 11:00


Sodium Bicarbonate (Sodium Bicarbonate) 650 mg TID PO  Last administered on 9/28 /17at 14:07;  Start 9/28/17 at 14:00


Oxycodone/ Acetaminophen (Percocet 10/325) 1 tab PRN Q6HRS  PRN PO PAIN;  Start 

9/28/17 at 10:30


Darbepoetin Benny (Aranesp) 60 mcg Th SQ ;  Start 9/28/17 at 21:00





Active Scripts


Active


Sodium Bicarbonate 650 Mg Tablet 650 Mg PO TID


Cozaar (Losartan Potassium) 50 Mg Tablet 100 Mg PO DAILY08


Gabapentin 300 Mg Capsule 300 Mg PO DAILY


Reported


Isosorbide Mononitrate 20 Mg Tablet 60 Mg PO DAILY


Clopidogrel (Clopidogrel Bisulfate) 75 Mg Tablet 1 Tab PO DAILY


Aspirin 325 Mg Tablet 1 Tab PO DAILY


Ranexa (Ranolazine) 500 Mg Tab.er.12h 1 Tab PO BID


Humulin N Kwikpen (Nph, Human Insulin Isophane) 100 Unit/1 Ml Insuln.pen 40 

Unit SQ BIDAC


Percocet  Mg Tablet (Oxycodone/Acetaminophen) 1 Each Tablet 1 Tab PO QID


Amlodipine Besylate 10 Mg Tablet 10 Mg PO DAILY


Ventolin Hfa Inhaler (Albuterol Sulfate) 18 Gm Hfa.aer.ad 2 Puff INH Q4HRS PRN


Carvedilol 25 Mg Tablet 25 Mg PO BID


Vitals/I & O





Vital Sign - Last 24 Hours








 9/27/17 9/27/17 9/27/17 9/27/17





 16:00 16:00 17:00 17:08


 


Temp 98.7   





 98.7   


 


Pulse 82  79 


 


Resp 14  12 


 


B/P (MAP) 148/57 (87)  116/45 (68) 


 


Pulse Ox 100  100 


 


O2 Delivery Room Air Room Air Room Air Room Air


 


    





    





 9/27/17 9/27/17 9/27/17 9/27/17





 18:00 19:00 19:54 20:00


 


Temp    98.4





    98.4


 


Pulse 85 82  84


 


Resp 16 18  18


 


B/P (MAP) 133/57 (82) 126/56 (79)  97/60 (72)


 


Pulse Ox 100 100  100


 


O2 Delivery Room Air Room Air Room Air Room Air


 


    





    





 9/27/17 9/27/17 9/27/17 9/27/17





 21:00 22:00 23:00 23:37


 


Pulse 83 78 84 


 


Resp 21 14 12 


 


B/P (MAP) 104/47 (66) 134/43 (73) 113/58 (76) 


 


Pulse Ox 100 100 100 


 


O2 Delivery Room Air Room Air Room Air Room Air





 9/27/17 9/28/17 9/28/17 9/28/17





 23:48 00:00 00:48 01:00


 


Temp  97.9  





  97.9  


 


Pulse  80  83


 


Resp 12 20 16 16


 


B/P (MAP)  118/58 (78)  141/56 (84)


 


Pulse Ox 100 100  100


 


O2 Delivery Room Air Room Air  Room Air


 


    





    





 9/28/17 9/28/17 9/28/17 9/28/17





 01:54 02:00 03:00 03:53


 


Pulse  89 84 


 


Resp 12 12 16 


 


B/P (MAP)  109/57 (74) 118/56 (76) 


 


Pulse Ox 100 100 98 


 


O2 Delivery Room Air Room Air Room Air Room Air





 9/28/17 9/28/17 9/28/17 9/28/17





 04:00 05:00 05:05 05:35


 


Temp 97.5   





 97.5   


 


Pulse 82 79  


 


Resp 14 16 12 12


 


B/P (MAP) 119/51 (73) 122/64 (83)  


 


Pulse Ox 99 100 100 


 


O2 Delivery Room Air Room Air Room Air 


 


    





    





 9/28/17 9/28/17 9/28/17 9/28/17





 06:00 06:53 07:00 08:00


 


Temp    98.6





    98.6


 


Pulse 81  90 88


 


Resp 10 12 20 15


 


B/P (MAP) 129/57 (81)  151/52 (85) 103/54 (70)


 


Pulse Ox 100 100 98 98


 


O2 Delivery Room Air Room Air Room Air Room Air


 


    





    





 9/28/17 9/28/17 9/28/17 9/28/17





 08:00 08:17 09:00 10:00


 


Pulse   93 87


 


Resp   10 15


 


B/P (MAP)   125/67 (86) 119/60 (79)


 


Pulse Ox  99 100 99


 


O2 Delivery Room Air Room Air Room Air Room Air





 9/28/17 9/28/17 9/28/17 9/28/17





 10:58 10:59 10:59 11:00


 


Pulse 88 92 92 


 


B/P (MAP) 144/76 149/76 140/77 


 


Pulse Ox    100


 


O2 Delivery    Room Air





 9/28/17 9/28/17 9/28/17 9/28/17





 11:00 11:30 12:00 12:00


 


Temp    97.8





    97.8


 


Pulse 90   94


 


Resp 10   18


 


B/P (MAP) 126/57 (80)   121/64 (83)


 


Pulse Ox 99 100  99


 


O2 Delivery Room Air Room Air Room Air Room Air


 


    





    





 9/28/17 9/28/17 9/28/17 9/28/17





 12:24 13:00 13:24 14:00


 


Pulse  101  98


 


Resp  20  19


 


B/P (MAP)  145/59 (87)  115/51 (72)


 


Pulse Ox 90 100 99 99


 


O2 Delivery Room Air Room Air Room Air Room Air





 9/28/17 9/28/17  





 14:08 15:08  


 


Pulse Ox 100 99  


 


O2 Delivery Room Air Room Air  














Intake and Output   


 


 9/28/17 9/28/17 9/29/17





 15:00 23:00 07:00


 


Intake Total 450 ml  


 


Output Total 420 ml  


 


Balance 30 ml  

















DAMIAN ONEAL MD Sep 28, 2017 15:53

## 2017-09-29 NOTE — PDOC
PROGRESS NOTES


Subjective


Subjective


No chest pains.





No bleeding from the groin.





Objective


Objective





Vital Signs








  Date Time  Temp Pulse Resp B/P (MAP) Pulse Ox O2 Delivery O2 Flow Rate FiO2


 


9/29/17 15:11   11   Room Air  


 


9/29/17 15:00  69  125/75 (92) 98   


 


9/29/17 12:00 98.4       





 98.4       


 


9/27/17 13:42       2.0 














Intake and Output 


 


 9/30/17





 07:00


 


Intake Total 200 ml


 


Balance 200 ml


 


 


 


Intake Oral 200 ml


 


# Voids 1


 


# Bowel Movements 1











Physical Exam


Physical Exam


No significant changes and cardiac exam





Assessment


Assessment


Patient going for CABG on Tuesday.





Will insert art line and [place] catheter on Monday.





Problems


Medical Problems:


(1) Chest pain


Status: Acute  








Comment


Review of Relevant


I have reviewed the following items juan (where applicable) has been applied.


Labs





Laboratory Tests








Test


  9/27/17


18:35 9/27/17


20:15 9/28/17


00:35 9/28/17


05:20


 


Troponin I Quantitative


  < 0.017 ng/mL


(0.000-0.055) 


  < 0.017 ng/mL


(0.000-0.055) 


 


 


Heparin Anti-Xa Act,


Unfractionated 


  0.15 IU/mL


(0.30-0.70) 


  


 


 


White Blood Count


  


  


  


  10.1 x10^3/uL


(4.0-11.0)


 


Red Blood Count


  


  


  


  4.07 x10^6/uL


(4.30-5.70)


 


Hemoglobin


  


  


  


  11.4 g/dL


(13.0-17.5)


 


Hematocrit


  


  


  


  34.9 %


(39.0-53.0)


 


Mean Corpuscular Volume    86 fL () 


 


Mean Corpuscular Hemoglobin    28 pg (25-35) 


 


Mean Corpuscular Hemoglobin


Concent 


  


  


  33 g/dL


(31-37)


 


Red Cell Distribution Width


  


  


  


  14.0 %


(11.5-14.5)


 


Platelet Count


  


  


  


  274 x10^3/uL


(140-400)


 


Neutrophils (%) (Auto)    56 % (31-73) 


 


Lymphocytes (%) (Auto)    32 % (24-48) 


 


Monocytes (%) (Auto)    9 % (0-9) 


 


Eosinophils (%) (Auto)    2 % (0-3) 


 


Basophils (%) (Auto)    1 % (0-3) 


 


Neutrophils # (Auto)


  


  


  


  5.7 x10^3uL


(1.8-7.7)


 


Lymphocytes # (Auto)


  


  


  


  3.3 x10^3/uL


(1.0-4.8)


 


Monocytes # (Auto)


  


  


  


  0.9 x10^3/uL


(0.0-1.1)


 


Eosinophils # (Auto)


  


  


  


  0.2 x10^3/uL


(0.0-0.7)


 


Basophils # (Auto)


  


  


  


  0.1 x10^3/uL


(0.0-0.2)


 


Sodium Level


  


  


  


  134 mmol/L


(136-145)


 


Potassium Level


  


  


  


  4.6 mmol/L


(3.5-5.1)


 


Chloride Level


  


  


  


  96 mmol/L


()


 


Carbon Dioxide Level


  


  


  


  26 mmol/L


(21-32)


 


Anion Gap    12 (6-14) 


 


Blood Urea Nitrogen


  


  


  


  36 mg/dL


(8-26)


 


Creatinine


  


  


  


  4.5 mg/dL


(0.7-1.3)


 


Estimated GFR


(Cockcroft-Gault) 


  


  


  13.8 


 


 


Glucose Level


  


  


  


  295 mg/dL


(70-99)


 


Calcium Level


  


  


  


  8.8 mg/dL


(8.5-10.1)


 


Test


  9/28/17


09:50 9/28/17


12:38 9/28/17


21:09 9/29/17


04:00


 


Hemoglobin A1c


  8.6 %


(4.8-5.6) 


  


  


 


 


Glucose (Fingerstick)


  


  333 mg/dL


(70-99) 386 mg/dL


(70-99) 


 


 


White Blood Count


  


  


  


  8.5 x10^3/uL


(4.0-11.0)


 


Red Blood Count


  


  


  


  3.66 x10^6/uL


(4.30-5.70)


 


Hemoglobin


  


  


  


  10.3 g/dL


(13.0-17.5)


 


Hematocrit


  


  


  


  31.3 %


(39.0-53.0)


 


Mean Corpuscular Volume    86 fL () 


 


Mean Corpuscular Hemoglobin    28 pg (25-35) 


 


Mean Corpuscular Hemoglobin


Concent 


  


  


  33 g/dL


(31-37)


 


Red Cell Distribution Width


  


  


  


  13.9 %


(11.5-14.5)


 


Platelet Count


  


  


  


  238 x10^3/uL


(140-400)


 


Sodium Level


  


  


  


  134 mmol/L


(136-145)


 


Potassium Level


  


  


  


  4.0 mmol/L


(3.5-5.1)


 


Chloride Level


  


  


  


  94 mmol/L


()


 


Carbon Dioxide Level


  


  


  


  27 mmol/L


(21-32)


 


Anion Gap    13 (6-14) 


 


Blood Urea Nitrogen


  


  


  


  49 mg/dL


(8-26)


 


Creatinine


  


  


  


  4.9 mg/dL


(0.7-1.3)


 


Estimated GFR


(Cockcroft-Gault) 


  


  


  12.5 


 


 


Glucose Level


  


  


  


  312 mg/dL


(70-99)


 


Calcium Level


  


  


  


  9.2 mg/dL


(8.5-10.1)


 


Test


  9/29/17


08:50 9/29/17


16:09 


  


 


 


Glucose (Fingerstick)


  197 mg/dL


(70-99) 340 mg/dL


(70-99) 


  


 








Laboratory Tests








Test


  9/28/17


21:09 9/29/17


04:00 9/29/17


08:50 9/29/17


16:09


 


Glucose (Fingerstick)


  386 mg/dL


(70-99) 


  197 mg/dL


(70-99) 340 mg/dL


(70-99)


 


White Blood Count


  


  8.5 x10^3/uL


(4.0-11.0) 


  


 


 


Red Blood Count


  


  3.66 x10^6/uL


(4.30-5.70) 


  


 


 


Hemoglobin


  


  10.3 g/dL


(13.0-17.5) 


  


 


 


Hematocrit


  


  31.3 %


(39.0-53.0) 


  


 


 


Mean Corpuscular Volume  86 fL ()   


 


Mean Corpuscular Hemoglobin  28 pg (25-35)   


 


Mean Corpuscular Hemoglobin


Concent 


  33 g/dL


(31-37) 


  


 


 


Red Cell Distribution Width


  


  13.9 %


(11.5-14.5) 


  


 


 


Platelet Count


  


  238 x10^3/uL


(140-400) 


  


 


 


Sodium Level


  


  134 mmol/L


(136-145) 


  


 


 


Potassium Level


  


  4.0 mmol/L


(3.5-5.1) 


  


 


 


Chloride Level


  


  94 mmol/L


() 


  


 


 


Carbon Dioxide Level


  


  27 mmol/L


(21-32) 


  


 


 


Anion Gap  13 (6-14)   


 


Blood Urea Nitrogen


  


  49 mg/dL


(8-26) 


  


 


 


Creatinine


  


  4.9 mg/dL


(0.7-1.3) 


  


 


 


Estimated GFR


(Cockcroft-Gault) 


  12.5 


  


  


 


 


Glucose Level


  


  312 mg/dL


(70-99) 


  


 


 


Calcium Level


  


  9.2 mg/dL


(8.5-10.1) 


  


 








Medications





Current Medications


Iodixanol (Visipaque 320) 100 ml [employer]-MED ONCE .ROUTE ;  Start 9/27/17 at 11:27;

  Stop 9/27/17 at 11:28;  Status DC


Lidocaine HCl 20 ml STK-MED ONCE .ROUTE ;  Start 9/27/17 at 11:27;  Stop 9/27/ 17 at 11:28;  Status DC


Heparin Sodium/ Sodium Chloride 1,000 ml @  As Directed STK-MED ONCE .ROUTE ;  

Start 9/27/17 at 11:28;  Stop 9/27/17 at 11:29;  Status DC


Ondansetron HCl (Zofran) 4 mg PRN Q8HRS  PRN IV NAUSEA/VOMITING;  Start 9/27/17 

at 12:30;  Stop 9/28/17 at 12:29;  Status DC


Fentanyl Citrate (Fentanyl 2ml Vial) 100 mcg STK-MED ONCE .ROUTE ;  Start 9/27/ 17 at 12:33;  Stop 9/27/17 at 12:34;  Status DC


Midazolam HCl (Versed) 5 mg STK-MED ONCE .ROUTE ;  Start 9/27/17 at 12:33;  

Stop 9/27/17 at 12:34;  Status DC


Heparin Sodium (Porcine) (Heparin Sodium) 10,000 unit STK-MED ONCE .ROUTE ;  

Start 9/27/17 at 12:59;  Stop 9/27/17 at 13:00;  Status DC


Heparin Sodium/ Dextrose 500 ml @  As Directed STK-MED ONCE IV ;  Start 9/27/17 

at 13:21;  Stop 9/27/17 at 13:22;  Status DC


Heparin Sodium/ Sodium Chloride 1,000 unit 1X  ONCE IART  Last administered on 9 /27/17at 13:40;  Start 9/27/17 at 13:00;  Stop 9/27/17 at 13:47;  Status DC


Midazolam HCl (Versed) 5 mg 1X  ONCE IV  Last administered on 9/27/17at 13:41;  

Start 9/27/17 at 13:00;  Stop 9/27/17 at 13:47;  Status DC


Fentanyl Citrate (Fentanyl 2ml Vial) 100 mcg 1X  ONCE IV  Last administered on 9 /27/17at 13:42;  Start 9/27/17 at 13:00;  Stop 9/27/17 at 13:47;  Status DC


Iodixanol (Visipaque 320) 100 ml 1X  ONCE IART  Last administered on 9/27/17at 

13:40;  Start 9/27/17 at 13:00;  Stop 9/27/17 at 13:47;  Status DC


Heparin Sodium (Porcine) (Heparin Sodium) 8,000 unit 1X  ONCE IV  Last 

administered on 9/27/17at 13:43;  Start 9/27/17 at 13:00;  Stop 9/27/17 at 13:47

;  Status DC


Lidocaine HCl 20 ml 1X  ONCE IJ  Last administered on 9/27/17at 13:40;  Start 9/ 27/17 at 13:00;  Stop 9/27/17 at 13:47;  Status DC


Heparin Sodium (Porcine) (Heparin 5,000 Units/1,000ml NS) 5,000 unit 1X  ONCE 

IV  Last administered on 9/27/17at 13:00;  Start 9/27/17 at 13:00;  Stop 9/27/ 17 at 13:47;  Status DC


Heparin Sodium/ Dextrose 500 ml @  20 mls/hr CONT PRN  PRN IV SEE COMMENTS Last 

administered on 9/27/17at 13:45;  Start 9/27/17 at 13:00;  Stop 9/28/17 at 09:04

;  Status DC


Oxycodone/ Acetaminophen (Percocet 10/325) 1 tab PRN Q6HRS  PRN PO PAIN Last 

administered on 9/29/17at 05:19;  Start 9/27/17 at 16:45


Cyclobenzaprine HCl (Flexeril) 10 mg PRN Q6HRS  PRN PO MUSCLE SPASMS Last 

administered on 9/28/17at 21:07;  Start 9/27/17 at 16:45


Diazepam (Valium) 5 mg PRN Q6HRS  PRN PO ANXIETY Last administered on 9/29/17at 

12:05;  Start 9/27/17 at 16:45


Polyethylene Glycol (miraLAX PACKET) 17 gm DAILY PO ;  Start 9/28/17 at 09:00;  

Stop 9/28/17 at 09:00;  Status DC


Polyethylene Glycol (miraLAX PACKET) 17 gm DAILY PO  Last administered on 9/29/ 17at 11:33;  Start 9/27/17 at 21:00


Fentanyl Citrate (Fentanyl 2ml Vial) 50 mcg PRN Q3HRS  PRN IV SEVERE PAIN Last 

administered on 9/28/17at 11:00;  Start 9/28/17 at 01:45


Zolpidem Tartrate (Ambien) 5 mg PRN QHS  PRN PO INSOMNIA, MAY REPEAT IN 1HR 

Last administered on 9/28/17at 21:06;  Start 9/28/17 at 09:30


Cefazolin Sodium/ Dextrose 50 ml @  100 mls/hr 1X  ONCE IV ;  Start 10/3/17 at 

06:00;  Stop 10/3/17 at 06:29


Amlodipine Besylate (Norvasc) 10 mg DAILY PO  Last administered on 9/29/17at 14:

12;  Start 9/28/17 at 11:00


Aspirin (Myles Aspirin) 325 mg DAILY PO ;  Start 9/28/17 at 11:00;  Stop 9/28/ 17 at 11:00;  Status DC


Clopidogrel Bisulfate (Plavix) 75 mg DAILY PO ;  Start 9/28/17 at 11:00;  Stop 9 /28/17 at 11:00;  Status DC


Isosorbide Mononitrate (Imdur) 30 mg DAILY PO  Last administered on 9/29/17at 11

:29;  Start 9/28/17 at 11:00


Losartan Potassium (Cozaar) 100 mg DAILY08 PO ;  Start 9/28/17 at 11:00;  Stop 9 /28/17 at 11:00;  Status DC


Oxycodone/ Acetaminophen (Percocet 10/325) 1 tab QID PO  Last administered on 9/ 29/17at 14:11;  Start 9/28/17 at 13:00


Ranolazine (Ranexa) 500 mg BID PO  Last administered on 9/29/17at 11:28;  Start 

9/28/17 at 11:00


Sodium Bicarbonate (Sodium Bicarbonate) 650 mg TID PO  Last administered on 9/29 /17at 14:10;  Start 9/28/17 at 14:00


Oxycodone/ Acetaminophen (Percocet 10/325) 1 tab PRN Q6HRS  PRN PO PAIN;  Start 

9/28/17 at 10:30


Darbepoetin Benny (Aranesp) 60 mcg Th SQ  Last administered on 9/28/17at 21:02;  

Start 9/28/17 at 21:00


Insulin Aspart (NovoLOG) 10 units 1X  ONCE SQ  Last administered on 9/28/17at 21

:40;  Start 9/28/17 at 22:00;  Stop 9/28/17 at 22:01;  Status DC


Insulin Detemir (Levemir) 40 units BID SQ  Last administered on 9/29/17at 11:32

;  Start 9/28/17 at 22:00


Sodium Chloride 1,000 ml @  1,000 mls/hr Q1H PRN IV hypotension;  Start 9/29/17 

at 06:52;  Stop 9/29/17 at 12:51;  Status DC


Sodium Chloride (Normal Saline Flush) 10 ml 1X PRN  PRN IV AP catheter pack;  

Start 9/29/17 at 07:00;  Stop 9/30/17 at 06:59


Sodium Chloride (Normal Saline Flush) 10 ml 1X PRN  PRN IV  catheter pack;  

Start 9/29/17 at 07:00;  Stop 9/30/17 at 06:59


Info (PHARMACY MONITORING -- do not chart) 1 each PRN DAILY  PRN MC SEE COMMENTS

;  Start 9/29/17 at 07:00





Active Scripts


Active


Sodium Bicarbonate 650 Mg Tablet 650 Mg PO TID


Cozaar (Losartan Potassium) 50 Mg Tablet 100 Mg PO DAILY08


Gabapentin 300 Mg Capsule 300 Mg PO DAILY


Reported


Isosorbide Mononitrate 20 Mg Tablet 60 Mg PO DAILY


Clopidogrel (Clopidogrel Bisulfate) 75 Mg Tablet 1 Tab PO DAILY


Aspirin 325 Mg Tablet 1 Tab PO DAILY


Ranexa (Ranolazine) 500 Mg Tab.er.12h 1 Tab PO BID


Humulin N Kwikpen (Nph, Human Insulin Isophane) 100 Unit/1 Ml Insuln.pen 40 

Unit SQ BIDAC


Percocet  Mg Tablet (Oxycodone/Acetaminophen) 1 Each Tablet 1 Tab PO QID


Amlodipine Besylate 10 Mg Tablet 10 Mg PO DAILY


Ventolin Hfa Inhaler (Albuterol Sulfate) 18 Gm Hfa.aer.ad 2 Puff INH Q4HRS PRN


Carvedilol 25 Mg Tablet 25 Mg PO BID


Vitals/I & O





Vital Sign - Last 24 Hours








 9/28/17 9/28/17 9/28/17 9/28/17





 18:00 19:00 19:30 20:00


 


Temp  98.0  





  98.0  


 


Pulse 88 84  95


 


Resp 29 21  17


 


B/P (MAP) 131/56 (81) 132/60 (84)  142/66 (91)


 


Pulse Ox 99 96  94


 


O2 Delivery Room Air Room Air Room Air Room Air


 


    





    





 9/28/17 9/28/17 9/28/17 9/28/17





 21:00 21:01 21:01 22:00


 


Pulse 92  97 


 


Resp 10 17  


 


B/P (MAP) 140/53 (82)  140/53 


 


Pulse Ox 96 97  96


 


O2 Delivery Room Air Room Air  





 9/28/17 9/28/17 9/29/17 9/29/17





 22:00 23:00 00:00 00:01


 


Pulse 87 86 86 


 


Resp 17 16 16 


 


B/P (MAP) 146/87 (106) 118/54 (75) 142/66 (91) 


 


Pulse Ox 96 96 94 


 


O2 Delivery Room Air Room Air Room Air Room Air





 9/29/17 9/29/17 9/29/17 9/29/17





 01:00 02:00 03:00 03:58


 


Pulse 84 86 84 


 


Resp 14 19 16 


 


B/P (MAP) 149/75 (99) 163/75 (104) 145/61 (89) 


 


Pulse Ox 95 97 92 


 


O2 Delivery Room Air Room Air Room Air Room Air





 9/29/17 9/29/17 9/29/17 9/29/17





 04:00 05:00 05:19 06:00


 


Temp 98.9   





 98.9   


 


Pulse 83 90  86


 


Resp 18 16 20 17


 


B/P (MAP) 145/65 (91) 140/59 (86)  132/55 (80)


 


Pulse Ox 92 95 97 94


 


O2 Delivery Room Air Room Air Room Air Room Air


 


    





    





 9/29/17 9/29/17 9/29/17 9/29/17





 06:20 07:00 08:00 08:00


 


Temp    98.4





    98.4


 


Pulse  82  82


 


Resp 14 14  7


 


B/P (MAP)  134/56 (82)  144/70 (94)


 


Pulse Ox 93 94  95


 


O2 Delivery Room Air Room Air Room Air Room Air


 


    





    





 9/29/17 9/29/17 9/29/17 9/29/17





 09:00 10:00 11:00 11:28


 


Pulse 94 96 92 98


 


Resp 25 13 12 


 


B/P (MAP) 134/85 (101) 163/82 (109) 143/74 (97) 142/78


 


Pulse Ox 98 97 97 


 


O2 Delivery Room Air Room Air Room Air 





 9/29/17 9/29/17 9/29/17 9/29/17





 11:29 11:30 12:00 12:00


 


Temp    98.4





    98.4


 


Pulse 98   98


 


Resp  23  15


 


B/P (MAP) 142/78   126/43 (70)


 


Pulse Ox    98


 


O2 Delivery  Room Air Room Air Room Air


 


    





    





 9/29/17 9/29/17 9/29/17 9/29/17





 13:00 14:00 14:11 14:12


 


Pulse 86 84  89


 


Resp 15 18 24 


 


B/P (MAP) 103/68 (80) 144/70 (94)  144/70


 


Pulse Ox 95 97  


 


O2 Delivery Room Air Room Air Room Air 





 9/29/17 9/29/17  





 15:00 15:11  


 


Pulse 69   


 


Resp 20 11  


 


B/P (MAP) 125/75 (92)   


 


Pulse Ox 98   


 


O2 Delivery Room Air Room Air  














Intake and Output   


 


 9/29/17 9/29/17 9/30/17





 15:00 23:00 07:00


 


Intake Total 200 ml  


 


Balance 200 ml  

















DAMIAN ONEAL MD Sep 29, 2017 17:08

## 2017-09-29 NOTE — RAD
Examination: CT chest without contrast



History: History of preop CABG



Comparison: 12/17/2016



Technique: Axial CT images of the chest were performed without contrast.

Coronal and sagittal reformats are performed





PQRS Compliance Statement:



One or more of the following individualized dose reduction techniques were

utilized for this examination:

1. Automated exposure control

2. Adjustment of the mA and/or kV according to patient size

3. Use of iterative reconstruction technique.





Findings:



Right-sided jugular line identified with the tip projecting in the distal SVC

region.



The heart size grossly appears unremarkable. Diffuse coronary artery

calcifications.



Calcification of the mitral valve identified.



The caliber of the ascending aorta measures 3.3 cm in transverse dimension.



No radiologically significant mediastinal lymphadenopathy identified. The

central airways are patent.



Mild airspace opacity identified in the right upper lobe and left lung base

likely atelectasis or scarring. No evidence of pleural effusion or

pneumothorax



The visualized liver, spleen, left adrenal grossly appears unremarkable. Small

right adrenal calcification identified.



Vicarious excretion of contrast identified in the gallbladder likely from

recent contrasted exam with small hypodensities within the gallbladder likely

gallstones.



Mild degenerative changes thoracic spine.





Impression



1. Coronary artery calcifications.



2. Minimal linear airspace opacities identified in the right upper lobe, left

lung base likely atelectasis or scarring changes.



3. Gallstones within the gallbladder.

## 2017-09-29 NOTE — PDOC
Provider Note


Provider Note


vss, glucose high but just got back on insulin- cont all meds same, or next week











ARIC MATUTE MD Sep 29, 2017 09:15

## 2017-09-29 NOTE — PDOC
Renal-Progress Notes


Subjective Notes


Notes


TIRED BUT NO CHEST PAIN





History of Present Illness


Hx of present illness


STABLE





Vitals


Vitals





Vital Signs








  Date Time  Temp Pulse Resp B/P (MAP) Pulse Ox O2 Delivery O2 Flow Rate FiO2


 


9/29/17 11:30   23   Room Air  


 


9/29/17 11:29  98  142/78    


 


9/29/17 10:00     97   


 


9/29/17 08:00 98.4       





 98.4       








Weight


Weight [ ]





Labs


Labs





Laboratory Tests








Test


  9/28/17


12:38 9/28/17


21:09 9/29/17


04:00 9/29/17


08:50


 


Glucose (Fingerstick)


  333 mg/dL


(70-99) 386 mg/dL


(70-99) 


  197 mg/dL


(70-99)


 


White Blood Count


  


  


  8.5 x10^3/uL


(4.0-11.0) 


 


 


Red Blood Count


  


  


  3.66 x10^6/uL


(4.30-5.70) 


 


 


Hemoglobin


  


  


  10.3 g/dL


(13.0-17.5) 


 


 


Hematocrit


  


  


  31.3 %


(39.0-53.0) 


 


 


Mean Corpuscular Volume   86 fL ()  


 


Mean Corpuscular Hemoglobin   28 pg (25-35)  


 


Mean Corpuscular Hemoglobin


Concent 


  


  33 g/dL


(31-37) 


 


 


Red Cell Distribution Width


  


  


  13.9 %


(11.5-14.5) 


 


 


Platelet Count


  


  


  238 x10^3/uL


(140-400) 


 


 


Sodium Level


  


  


  134 mmol/L


(136-145) 


 


 


Potassium Level


  


  


  4.0 mmol/L


(3.5-5.1) 


 


 


Chloride Level


  


  


  94 mmol/L


() 


 


 


Carbon Dioxide Level


  


  


  27 mmol/L


(21-32) 


 


 


Anion Gap   13 (6-14)  


 


Blood Urea Nitrogen


  


  


  49 mg/dL


(8-26) 


 


 


Creatinine


  


  


  4.9 mg/dL


(0.7-1.3) 


 


 


Estimated GFR


(Cockcroft-Gault) 


  


  12.5 


  


 


 


Glucose Level


  


  


  312 mg/dL


(70-99) 


 


 


Calcium Level


  


  


  9.2 mg/dL


(8.5-10.1) 


 











Review of Systems


Constitutional:  yes: weakness, alert, oriented


Ears/Nose/Throat:  Yes: no symptom reported


Pulmonary:  Yes no symptom reported


Cardiovascular:  Yes no symptom reported


Genitourinary:  Yes: no symptom reported


Musculoskeletal:  Yes: muscle stiffness


Psychiatric/Neurological:  Yes: no symptom reported


Endocrine:  Yes: no symptom reported





Physical Exam


General Appearance:  no apparent distress


Skin:  warm


Respiratory:  bilateral CTA


Heart:  S1S2, RRR


Abdomen:  soft, bowel sounds present


Genitourinary:  bladder flat


Extremities:  pulses present


Neurology:  alert, oriented





Assessment


Assessment


IMP





CHEST PAIN


SEVERE CAD


ANEMIA


DM II


HTN


ESRD





PLAN





HD TODAY


UF TO DW


CABG TUESDAY











RACHEAL GUERRERO MD Sep 29, 2017 11:38

## 2017-09-30 NOTE — PDOC
SUBJECTIVE


Subjective


no pain but anxious and worried about upcoming CABG





OBJECTIVE


Vital Signs





Vital Signs








  Date Time  Temp Pulse Resp B/P (MAP) Pulse Ox O2 Delivery O2 Flow Rate FiO2


 


9/30/17 11:00 97.8 96 20 146/49 (81) 93 Room Air  





 97.8       


 


9/30/17 09:15   18   Room Air  


 


9/30/17 09:14  102  186/93    


 


9/30/17 09:13  108  186/93    


 


9/30/17 09:12  108  186/93    


 


9/30/17 08:00      Room Air  


 


9/30/17 07:00 97.6 90 20 186/93 (124) 97 Room Air  





 97.6       


 


9/30/17 03:36 98.1 78 20 119/32 (61) 96 Room Air  





 98.1       


 


9/30/17 02:05   16   Room Air  


 


9/30/17 01:11   16  94 Room Air 2.0 


 


9/29/17 23:40 97.9 75 20 133/63 (86) 94 Room Air  





 97.9       


 


9/29/17 22:37   18   Room Air  


 


9/29/17 21:21  83  159/80    


 


9/29/17 21:21   18   Room Air  


 


9/29/17 19:48      Room Air 2.0 


 


9/29/17 19:35 97.6 83 25 159/80 (106) 94 Room Air  





 97.6       


 


9/29/17 18:15     98   


 


9/29/17 17:16     98 Room Air 2.0 


 


9/29/17 15:00  69 20 125/75 (92) 98 Room Air  


 


9/29/17 14:12  89  144/70    


 


9/29/17 14:11   24   Room Air  


 


9/29/17 14:00  84 18 144/70 (94) 97 Room Air  


 


9/29/17 13:00  86 15 103/68 (80) 95 Room Air  








I & O











Intake and Output 


 


 10/1/17





 07:00


 


Intake Total 250 ml


 


Balance 250 ml


 


 


 


Intake Oral 250 ml











PHYSICAL EXAM


Physical Exam


lungs clear


heart RRR


abd soft


ext left BKA





ASSESSMENT/PLAN


Assessment/Plan


1- severe CAD need CABG plan for early next week


2-ESRD on hemodialysis


3- anemia of chronic diseae


4-DM II 


5- Cholelithiasis


6-HTN


7-anxiety


Problems:  





COMMENT


Lab





Laboratory Tests








Test


  9/29/17


16:09 9/29/17


20:42 9/30/17


08:52 9/30/17


10:39


 


Glucose (Fingerstick)


  340 mg/dL


(70-99) 324 mg/dL


(70-99) 179 mg/dL


(70-99) 294 mg/dL


(70-99)

















JOSETTE RICHEY MD Sep 30, 2017 12:35

## 2017-09-30 NOTE — PDOC
PROGRESS NOTES


Subjective


Subjective


No chest pains.





Objective


Objective





Vital Signs








  Date Time  Temp Pulse Resp B/P (MAP) Pulse Ox O2 Delivery O2 Flow Rate FiO2


 


9/30/17 11:00 97.8 96 20 146/49 (81) 93 Room Air  





 97.8       


 


9/30/17 01:11       2.0 














Intake and Output 


 


 10/1/17





 07:00


 


Intake Total 500 ml


 


Balance 500 ml


 


 


 


Intake Oral 500 ml











Physical Exam


Physical Exam


No significant changes in cardiac exam





Assessment


Assessment


Patient with severe CAD.





Will insert a Joy-Haroldo catheter and an art line on Monday morning in 

preparation for his open heart surgery.





Problems


Medical Problems:


(1) Chest pain


Status: Acute  








Comment


Review of Relevant


I have reviewed the following items juan (where applicable) has been applied.


Labs





Laboratory Tests








Test


  9/28/17


21:09 9/29/17


04:00 9/29/17


08:50 9/29/17


16:09


 


Glucose (Fingerstick)


  386 mg/dL


(70-99) 


  197 mg/dL


(70-99) 340 mg/dL


(70-99)


 


White Blood Count


  


  8.5 x10^3/uL


(4.0-11.0) 


  


 


 


Red Blood Count


  


  3.66 x10^6/uL


(4.30-5.70) 


  


 


 


Hemoglobin


  


  10.3 g/dL


(13.0-17.5) 


  


 


 


Hematocrit


  


  31.3 %


(39.0-53.0) 


  


 


 


Mean Corpuscular Volume  86 fL ()   


 


Mean Corpuscular Hemoglobin  28 pg (25-35)   


 


Mean Corpuscular Hemoglobin


Concent 


  33 g/dL


(31-37) 


  


 


 


Red Cell Distribution Width


  


  13.9 %


(11.5-14.5) 


  


 


 


Platelet Count


  


  238 x10^3/uL


(140-400) 


  


 


 


Sodium Level


  


  134 mmol/L


(136-145) 


  


 


 


Potassium Level


  


  4.0 mmol/L


(3.5-5.1) 


  


 


 


Chloride Level


  


  94 mmol/L


() 


  


 


 


Carbon Dioxide Level


  


  27 mmol/L


(21-32) 


  


 


 


Anion Gap  13 (6-14)   


 


Blood Urea Nitrogen


  


  49 mg/dL


(8-26) 


  


 


 


Creatinine


  


  4.9 mg/dL


(0.7-1.3) 


  


 


 


Estimated GFR


(Cockcroft-Gault) 


  12.5 


  


  


 


 


Glucose Level


  


  312 mg/dL


(70-99) 


  


 


 


Calcium Level


  


  9.2 mg/dL


(8.5-10.1) 


  


 


 


Test


  9/29/17


20:42 9/30/17


08:52 9/30/17


10:39 


 


 


Glucose (Fingerstick)


  324 mg/dL


(70-99) 179 mg/dL


(70-99) 294 mg/dL


(70-99) 


 








Laboratory Tests








Test


  9/29/17


16:09 9/29/17


20:42 9/30/17


08:52 9/30/17


10:39


 


Glucose (Fingerstick)


  340 mg/dL


(70-99) 324 mg/dL


(70-99) 179 mg/dL


(70-99) 294 mg/dL


(70-99)








Medications





Current Medications


Iodixanol (Visipaque 320) 100 ml STK-MED ONCE .ROUTE ;  Start 9/27/17 at 11:27;

  Stop 9/27/17 at 11:28;  Status DC


Lidocaine HCl 20 ml STK-MED ONCE .ROUTE ;  Start 9/27/17 at 11:27;  Stop 9/27/ 17 at 11:28;  Status DC


Heparin Sodium/ Sodium Chloride 1,000 ml @  As Directed STK-MED ONCE .ROUTE ;  

Start 9/27/17 at 11:28;  Stop 9/27/17 at 11:29;  Status DC


Ondansetron HCl (Zofran) 4 mg PRN Q8HRS  PRN IV NAUSEA/VOMITING;  Start 9/27/17 

at 12:30;  Stop 9/28/17 at 12:29;  Status DC


Fentanyl Citrate (Fentanyl 2ml Vial) 100 mcg STK-MED ONCE .ROUTE ;  Start 9/27/ 17 at 12:33;  Stop 9/27/17 at 12:34;  Status DC


Midazolam HCl (Versed) 5 mg STK-MED ONCE .ROUTE ;  Start 9/27/17 at 12:33;  

Stop 9/27/17 at 12:34;  Status DC


Heparin Sodium (Porcine) (Heparin Sodium) 10,000 unit STK-MED ONCE .ROUTE ;  

Start 9/27/17 at 12:59;  Stop 9/27/17 at 13:00;  Status DC


Heparin Sodium/ Dextrose 500 ml @  As Directed STK-MED ONCE IV ;  Start 9/27/17 

at 13:21;  Stop 9/27/17 at 13:22;  Status DC


Heparin Sodium/ Sodium Chloride 1,000 unit 1X  ONCE IART  Last administered on 9 /27/17at 13:40;  Start 9/27/17 at 13:00;  Stop 9/27/17 at 13:47;  Status DC


Midazolam HCl (Versed) 5 mg 1X  ONCE IV  Last administered on 9/27/17at 13:41;  

Start 9/27/17 at 13:00;  Stop 9/27/17 at 13:47;  Status DC


Fentanyl Citrate (Fentanyl 2ml Vial) 100 mcg 1X  ONCE IV  Last administered on 9 /27/17at 13:42;  Start 9/27/17 at 13:00;  Stop 9/27/17 at 13:47;  Status DC


Iodixanol (Visipaque 320) 100 ml 1X  ONCE IART  Last administered on 9/27/17at 

13:40;  Start 9/27/17 at 13:00;  Stop 9/27/17 at 13:47;  Status DC


Heparin Sodium (Porcine) (Heparin Sodium) 8,000 unit 1X  ONCE IV  Last 

administered on 9/27/17at 13:43;  Start 9/27/17 at 13:00;  Stop 9/27/17 at 13:47

;  Status DC


Lidocaine HCl 20 ml 1X  ONCE IJ  Last administered on 9/27/17at 13:40;  Start 9/ 27/17 at 13:00;  Stop 9/27/17 at 13:47;  Status DC


Heparin Sodium (Porcine) (Heparin 5,000 Units/1,000ml NS) 5,000 unit 1X  ONCE 

IV  Last administered on 9/27/17at 13:00;  Start 9/27/17 at 13:00;  Stop 9/27/ 17 at 13:47;  Status DC


Heparin Sodium/ Dextrose 500 ml @  20 mls/hr CONT PRN  PRN IV SEE COMMENTS Last 

administered on 9/27/17at 13:45;  Start 9/27/17 at 13:00;  Stop 9/28/17 at 09:04

;  Status DC


Oxycodone/ Acetaminophen (Percocet 10/325) 1 tab PRN Q6HRS  PRN PO PAIN Last 

administered on 9/30/17at 01:11;  Start 9/27/17 at 16:45;  Stop 9/30/17 at 12:30

;  Status DC


Cyclobenzaprine HCl (Flexeril) 10 mg PRN Q6HRS  PRN PO MUSCLE SPASMS Last 

administered on 9/28/17at 21:07;  Start 9/27/17 at 16:45


Diazepam (Valium) 5 mg PRN Q6HRS  PRN PO ANXIETY Last administered on 9/30/17at 

09:12;  Start 9/27/17 at 16:45


Polyethylene Glycol (miraLAX PACKET) 17 gm DAILY PO ;  Start 9/28/17 at 09:00;  

Stop 9/28/17 at 09:00;  Status DC


Polyethylene Glycol (miraLAX PACKET) 17 gm DAILY PO  Last administered on 9/30/ 17at 09:14;  Start 9/27/17 at 21:00


Fentanyl Citrate (Fentanyl 2ml Vial) 50 mcg PRN Q3HRS  PRN IV SEVERE PAIN Last 

administered on 9/28/17at 11:00;  Start 9/28/17 at 01:45


Zolpidem Tartrate (Ambien) 5 mg PRN QHS  PRN PO INSOMNIA, MAY REPEAT IN 1HR 

Last administered on 9/28/17at 21:06;  Start 9/28/17 at 09:30


Cefazolin Sodium/ Dextrose 50 ml @  100 mls/hr 1X  ONCE IV ;  Start 10/3/17 at 

06:00;  Stop 10/3/17 at 06:29


Amlodipine Besylate (Norvasc) 10 mg DAILY PO  Last administered on 9/30/17at 09:

12;  Start 9/28/17 at 11:00


Aspirin (Myles Aspirin) 325 mg DAILY PO ;  Start 9/28/17 at 11:00;  Stop 9/28/ 17 at 11:00;  Status DC


Clopidogrel Bisulfate (Plavix) 75 mg DAILY PO ;  Start 9/28/17 at 11:00;  Stop 9 /28/17 at 11:00;  Status DC


Isosorbide Mononitrate (Imdur) 30 mg DAILY PO  Last administered on 9/30/17at 09

:14;  Start 9/28/17 at 11:00


Losartan Potassium (Cozaar) 100 mg DAILY08 PO ;  Start 9/28/17 at 11:00;  Stop 9 /28/17 at 11:00;  Status DC


Oxycodone/ Acetaminophen (Percocet 10/325) 1 tab QID PO  Last administered on 9/ 30/17at 09:15;  Start 9/28/17 at 13:00


Ranolazine (Ranexa) 500 mg BID PO  Last administered on 9/30/17at 09:13;  Start 

9/28/17 at 11:00


Sodium Bicarbonate (Sodium Bicarbonate) 650 mg TID PO  Last administered on 9/30 /17at 09:13;  Start 9/28/17 at 14:00


Oxycodone/ Acetaminophen (Percocet 10/325) 1 tab PRN Q6HRS  PRN PO PAIN;  Start 

9/28/17 at 10:30


Darbepoetin Benny (Aranesp) 60 mcg Th SQ  Last administered on 9/28/17at 21:02;  

Start 9/28/17 at 21:00


Insulin Aspart (NovoLOG) 10 units 1X  ONCE SQ  Last administered on 9/28/17at 21

:40;  Start 9/28/17 at 22:00;  Stop 9/28/17 at 22:01;  Status DC


Insulin Detemir (Levemir) 40 units BID SQ  Last administered on 9/30/17at 09:20

;  Start 9/28/17 at 22:00;  Stop 9/30/17 at 12:31;  Status DC


Sodium Chloride 1,000 ml @  1,000 mls/hr Q1H PRN IV hypotension;  Start 9/29/17 

at 06:52;  Stop 9/29/17 at 12:51;  Status DC


Sodium Chloride (Normal Saline Flush) 10 ml 1X PRN  PRN IV AP catheter pack;  

Start 9/29/17 at 07:00;  Stop 9/30/17 at 06:59;  Status DC


Sodium Chloride (Normal Saline Flush) 10 ml 1X PRN  PRN IV  catheter pack;  

Start 9/29/17 at 07:00;  Stop 9/30/17 at 06:59;  Status DC


Info (PHARMACY MONITORING -- do not chart) 1 each PRN DAILY  PRN MC SEE COMMENTS

;  Start 9/29/17 at 07:00


Insulin Aspart (NovoLOG) 10 units TIDAC  PRN SQ BLOOD SUGAR > 200 Last 

administered on 9/30/17at 12:30;  Start 9/29/17 at 18:15


Insulin Aspart (NovoLOG) 20 units TIDAC  PRN SQ BLOOD SUGAR > 300 Last 

administered on 9/29/17at 18:34;  Start 9/29/17 at 18:15


Insulin Detemir (Levemir) 45 units BID SQ ;  Start 9/30/17 at 21:00


Diazepam (Valium) 2 mg PRN Q8HRS  PRN PO ANXIETY;  Start 9/30/17 at 12:30





Active Scripts


Active


Sodium Bicarbonate 650 Mg Tablet 650 Mg PO TID


Cozaar (Losartan Potassium) 50 Mg Tablet 100 Mg PO DAILY08


Gabapentin 300 Mg Capsule 300 Mg PO DAILY


Reported


Isosorbide Mononitrate 20 Mg Tablet 60 Mg PO DAILY


Clopidogrel (Clopidogrel Bisulfate) 75 Mg Tablet 1 Tab PO DAILY


Aspirin 325 Mg Tablet 1 Tab PO DAILY


Ranexa (Ranolazine) 500 Mg Tab.er.12h 1 Tab PO BID


Humulin N Kwikpen (Nph, Human Insulin Isophane) 100 Unit/1 Ml Insuln.pen 40 

Unit SQ BIDAC


Percocet  Mg Tablet (Oxycodone/Acetaminophen) 1 Each Tablet 1 Tab PO QID


Amlodipine Besylate 10 Mg Tablet 10 Mg PO DAILY


Ventolin Hfa Inhaler (Albuterol Sulfate) 18 Gm Hfa.aer.ad 2 Puff INH Q4HRS PRN


Carvedilol 25 Mg Tablet 25 Mg PO BID


Vitals/I & O





Vital Sign - Last 24 Hours








 9/29/17 9/29/17 9/29/17 9/29/17





 15:00 17:16 18:15 19:35


 


Temp    97.6





    97.6


 


Pulse 69   83


 


Resp 20   25


 


B/P (MAP) 125/75 (92)   159/80 (106)


 


Pulse Ox 98 98 98 94


 


O2 Delivery Room Air Room Air  Room Air


 


O2 Flow Rate  2.0  


 


    





    





 9/29/17 9/29/17 9/29/17 9/29/17





 19:48 21:21 21:21 22:37


 


Pulse   83 


 


Resp  18  18


 


B/P (MAP)   159/80 


 


O2 Delivery Room Air Room Air  Room Air


 


O2 Flow Rate 2.0   





 9/29/17 9/30/17 9/30/17 9/30/17





 23:40 01:11 02:05 03:36


 


Temp 97.9   98.1





 97.9   98.1


 


Pulse 75   78


 


Resp 20 16 16 20


 


B/P (MAP) 133/63 (86)   119/32 (61)


 


Pulse Ox 94 94  96


 


O2 Delivery Room Air Room Air Room Air Room Air


 


O2 Flow Rate  2.0  


 


    





    





 9/30/17 9/30/17 9/30/17 9/30/17





 07:00 08:00 09:12 09:13


 


Temp 97.6   





 97.6   


 


Pulse 90  108 108


 


Resp 20   


 


B/P (MAP) 186/93 (124)  186/93 186/93


 


Pulse Ox 97   


 


O2 Delivery Room Air Room Air  


 


    





    





 9/30/17 9/30/17 9/30/17 





 09:14 09:15 11:00 


 


Temp   97.8 





   97.8 


 


Pulse 102  96 


 


Resp  18 20 


 


B/P (MAP) 186/93  146/49 (81) 


 


Pulse Ox   93 


 


O2 Delivery  Room Air Room Air 














Intake and Output   


 


 9/30/17 9/30/17 10/1/17





 15:00 23:00 07:00


 


Intake Total 500 ml  


 


Balance 500 ml  

















DAMIAN ONEAL MD Sep 30, 2017 14:38

## 2017-09-30 NOTE — PDOC
Renal-Progress Notes


Subjective Notes


Notes


NO MORE CHEST PAIN, NERVOUS





History of Present Illness


Hx of present illness


STABLE





Vitals


Vitals





Vital Signs








  Date Time  Temp Pulse Resp B/P (MAP) Pulse Ox O2 Delivery O2 Flow Rate FiO2


 


9/30/17 11:00 97.8 96 20 146/49 (81) 93 Room Air  





 97.8       


 


9/30/17 01:11       2.0 








Weight


Weight [ ]





I.O.


Intake and Output











Intake and Output 


 


 10/1/17





 07:00


 


Intake Total 250 ml


 


Balance 250 ml


 


 


 


Intake Oral 250 ml











Labs


Labs





Laboratory Tests








Test


  9/29/17


16:09 9/29/17


20:42 9/30/17


08:52 9/30/17


10:39


 


Glucose (Fingerstick)


  340 mg/dL


(70-99) 324 mg/dL


(70-99) 179 mg/dL


(70-99) 294 mg/dL


(70-99)











Review of Systems


Constitutional:  yes: weakness, alert, oriented


Ears/Nose/Throat:  Yes: no symptom reported


Pulmonary:  Yes no symptom reported


Cardiovascular:  Yes no symptom reported


Genitourinary:  Yes: no symptom reported


Musculoskeletal:  Yes: muscle stiffness


Psychiatric/Neurological:  Yes: no symptom reported


Endocrine:  Yes: no symptom reported





Physical Exam


General Appearance:  no apparent distress


Skin:  warm


Respiratory:  bilateral CTA


Heart:  S1S2, RRR


Abdomen:  soft, bowel sounds present


Genitourinary:  bladder flat


Extremities:  pulses present


Neurology:  alert, oriented





Assessment


Assessment


IMP





CHEST PAIN


SEVERE CAD


ANEMIA


DM II


HTN


ESRD





PLAN





HD MONDAY


CABG TUESDAY











RACHEAL GUERRERO MD Sep 30, 2017 11:29

## 2017-10-01 NOTE — PDOC
Renal-Progress Notes


Subjective Notes


Notes


REMAINS NERVOUS





History of Present Illness


Hx of present illness


STABLE





Vitals


Vitals





Vital Signs








  Date Time  Temp Pulse Resp B/P (MAP) Pulse Ox O2 Delivery O2 Flow Rate FiO2


 


10/1/17 11:00 97.5 77 20 127/66 (86) 95 Room Air  





 97.5       


 


9/30/17 20:23       2.0 








Weight


Weight [ ]





I.O.


Intake and Output











Intake and Output 


 


 10/2/17





 07:00


 


Intake Total 250 ml


 


Balance 250 ml


 


 


 


Intake Oral 250 ml











Labs


Labs





Laboratory Tests








Test


  9/30/17


16:37 9/30/17


21:17 10/1/17


04:00 10/1/17


07:55


 


Glucose (Fingerstick)


  271 mg/dL


(70-99) 294 mg/dL


(70-99) 


  254 mg/dL


(70-99)


 


White Blood Count


  


  


  7.2 x10^3/uL


(4.0-11.0) 


 


 


Red Blood Count


  


  


  3.58 x10^6/uL


(4.30-5.70) 


 


 


Hemoglobin


  


  


  10.3 g/dL


(13.0-17.5) 


 


 


Hematocrit


  


  


  29.9 %


(39.0-53.0) 


 


 


Mean Corpuscular Volume   84 fL ()  


 


Mean Corpuscular Hemoglobin   29 pg (25-35)  


 


Mean Corpuscular Hemoglobin


Concent 


  


  34 g/dL


(31-37) 


 


 


Red Cell Distribution Width


  


  


  14.2 %


(11.5-14.5) 


 


 


Platelet Count


  


  


  244 x10^3/uL


(140-400) 


 


 


Erythrocyte Sedimentation Rate   85 (0-15)  


 


Sodium Level


  


  


  132 mmol/L


(136-145) 


 


 


Potassium Level


  


  


  3.7 mmol/L


(3.5-5.1) 


 


 


Chloride Level


  


  


  91 mmol/L


() 


 


 


Carbon Dioxide Level


  


  


  27 mmol/L


(21-32) 


 


 


Anion Gap   14 (6-14)  


 


Blood Urea Nitrogen


  


  


  64 mg/dL


(8-26) 


 


 


Creatinine


  


  


  6.0 mg/dL


(0.7-1.3) 


 


 


Estimated GFR


(Cockcroft-Gault) 


  


  9.9 


  


 


 


Glucose Level


  


  


  259 mg/dL


(70-99) 


 


 


Calcium Level


  


  


  8.5 mg/dL


(8.5-10.1) 


 


 


Test


  10/1/17


10:59 


  


  


 


 


Glucose (Fingerstick)


  268 mg/dL


(70-99) 


  


  


 











Review of Systems


Constitutional:  yes: weakness, alert, oriented


Ears/Nose/Throat:  Yes: no symptom reported


Pulmonary:  Yes no symptom reported


Cardiovascular:  Yes no symptom reported


Genitourinary:  Yes: no symptom reported


Musculoskeletal:  Yes: muscle stiffness


Psychiatric/Neurological:  Yes: no symptom reported


Endocrine:  Yes: no symptom reported





Physical Exam


General Appearance:  no apparent distress


Skin:  warm


Respiratory:  bilateral CTA


Heart:  S1S2, RRR


Abdomen:  soft, bowel sounds present


Genitourinary:  bladder flat


Extremities:  pulses present


Neurology:  alert, oriented





Assessment


Assessment


IMP





CHEST PAIN


SEVERE CAD


ANEMIA


DM II


HTN


ESRD





PLAN





HD TOMORROW


CABG TUESDAY











RACHEAL GUERRERO MD Oct 1, 2017 11:54

## 2017-10-01 NOTE — RAD
Bilateral lower extremity vein mapping.



history: Preop coronary bypass surgery.



Ultrasound was used to evaluate the greater saphenous veins of both lower

extremities. On the right side the proximal greater saphenous vein measures

0.48 cm. The  greater saphenous at the mid thigh measures 0.31 cm. At the knee

the greater saphenous measures 0.25 cm. In the calf the greater saphenous

measures 0.2 cm. Lesser saphenous vein measures 0.15 cm in the proximal calf

and 0.12 in the distal calf.



Greater saphenous vein of the left lower extremity is much smaller. The

proximal greater saphenous is 0.19 cm. The mid greater saphenous in the thighs

0.15 cm. The veins below the knee are not well imaged.



Impression:

1. Greater saphenous veins in the right lower extremity are open with flow

with color imaging. Veins on the right are more prominent than the veins on

the left.

## 2017-10-01 NOTE — PDOC
SUBJECTIVE


Subjective


slightly anxious but doing ok , no pain or SOB





OBJECTIVE


Vital Signs





Vital Signs








  Date Time  Temp Pulse Resp B/P (MAP) Pulse Ox O2 Delivery O2 Flow Rate FiO2


 


10/1/17 11:00 97.5 77 20 127/66 (86) 95 Room Air  





 97.5       


 


10/1/17 09:45   18   Room Air  


 


10/1/17 08:45   18   Room Air  


 


10/1/17 08:44  86  140/80    


 


10/1/17 08:43  90  140/80    


 


10/1/17 08:41  88  140/77    


 


10/1/17 07:00 97.8 78 18 164/86 (112) 94 Room Air  





 97.8       


 


10/1/17 06:06   16   Room Air  


 


9/30/17 23:44 98.1 72 20 140/77 (98) 95 Room Air  





 98.1       


 


9/30/17 22:30   16     


 


9/30/17 21:16  86  169/79    


 


9/30/17 21:16   16     


 


9/30/17 20:23      Room Air 2.0 


 


9/30/17 19:55 97.9 86 20 169/79 (109) 95 Room Air  





 97.9       


 


9/30/17 16:33      Room Air  


 


9/30/17 15:33   18   Nasal Cannula  


 


9/30/17 15:00 98.0 92 20 156/85 (108) 92 Room Air  





 98.0       








I & O











Intake and Output 


 


 10/2/17





 07:00


 


Intake Total 250 ml


 


Balance 250 ml


 


 


 


Intake Oral 250 ml











PHYSICAL EXAM


Physical Exam


no change





ASSESSMENT/PLAN


Assessment/Plan


1- severe CAD need CABG plan for early next week


2-ESRD on hemodialysis


3- anemia of chronic diseae


4-DM II adjusted insulin need tight control for wound healing


5- Cholelithiasis


6-HTN


7-anxiety


Problems:  





COMMENT


Lab





Laboratory Tests








Test


  9/30/17


16:37 9/30/17


21:17 10/1/17


04:00 10/1/17


07:55


 


Glucose (Fingerstick)


  271 mg/dL


(70-99) 294 mg/dL


(70-99) 


  254 mg/dL


(70-99)


 


White Blood Count


  


  


  7.2 x10^3/uL


(4.0-11.0) 


 


 


Red Blood Count


  


  


  3.58 x10^6/uL


(4.30-5.70) 


 


 


Hemoglobin


  


  


  10.3 g/dL


(13.0-17.5) 


 


 


Hematocrit


  


  


  29.9 %


(39.0-53.0) 


 


 


Mean Corpuscular Volume   84 fL ()  


 


Mean Corpuscular Hemoglobin   29 pg (25-35)  


 


Mean Corpuscular Hemoglobin


Concent 


  


  34 g/dL


(31-37) 


 


 


Red Cell Distribution Width


  


  


  14.2 %


(11.5-14.5) 


 


 


Platelet Count


  


  


  244 x10^3/uL


(140-400) 


 


 


Erythrocyte Sedimentation Rate   85 (0-15)  


 


Sodium Level


  


  


  132 mmol/L


(136-145) 


 


 


Potassium Level


  


  


  3.7 mmol/L


(3.5-5.1) 


 


 


Chloride Level


  


  


  91 mmol/L


() 


 


 


Carbon Dioxide Level


  


  


  27 mmol/L


(21-32) 


 


 


Anion Gap   14 (6-14)  


 


Blood Urea Nitrogen


  


  


  64 mg/dL


(8-26) 


 


 


Creatinine


  


  


  6.0 mg/dL


(0.7-1.3) 


 


 


Estimated GFR


(Cockcroft-Gault) 


  


  9.9 


  


 


 


Glucose Level


  


  


  259 mg/dL


(70-99) 


 


 


Calcium Level


  


  


  8.5 mg/dL


(8.5-10.1) 


 


 


Test


  10/1/17


10:59 


  


  


 


 


Glucose (Fingerstick)


  268 mg/dL


(70-99) 


  


  


 

















JOSETTE RICHEY MD Oct 1, 2017 12:37

## 2017-10-01 NOTE — PDOC
Provider Note


Provider Note


Complains of being extremely anxious regarding the surgery.


He is requesting a higher dose of Valium.


He would prefer to have the dialysis before the line placement.


I discussed with Dr. Mcmanus who  said that  that was  okay with him.


Patient can have breakfast in the morning and  NPO  thereafter.











TONE CROCKETT MD Oct 1, 2017 12:23

## 2017-10-02 NOTE — CARD
--------------- APPROVED REPORT --------------





Procedure(s) performed: Sedation Time: 88 minutes



HISTORY

The patient is a 53 year-old male with a history of : previous MI, renal failure with dialysis, diabe
rodrigo mellitus with treatment, coronary artery disease, hypertension.



INDICATION

The indication(s) include : unstable angina , chest pain, The patient is going for coronary artery by
pass surgery., The surgeon requested that a [place]-Haroldo catheter be inserted and also an art line., .



CASE TECHNIQUE

During this case, Fluoroscopy and low osmolar contrast were used for imaging.



PROCEDURE NARRATIVE

After obtaining informed consent the patient was brought to the catheter lab.



The left side of the neck was prepped and draped and also the left side of the chest as well as the r
ight wrist area.



The anatomy of the left side of the neck was evaluated and then the area was infiltrated with Xylocai
ne to obtain topical anesthesia.



Using a posterior approach advanced the introducer needle and obtain a backflow of blood that was slo
wly dripping and it appeared to be dark. The guidewire was advanced without any difficulty and lookin
g under fluoroscopy wire appeared to be going towards the dialysis catheter. A 9 Sammarinese sheath was ad
vanced over the guidewire and when I aspirated the sheath the blood was bright red coming back with p
ressure unlike what it appeared to have done with the needle.



I advanced the [place]-Haroldo catheter through the sheath without any difficulty and then injected 10 mL o
f contrast. I could then see that the catheter was in the aorta.



The catheter was then removed and I sutured the sheath in-place.



I was concerned with removing this 9 [place] sheath from the carotid and felt that this needed to be r
epaired by a vascular surgeon.



Therefore I called the vascular surgeon to come to see the patient.



I then went to the right wrist and inserted with Seldinger technique and art line in to the radial ar
pina without difficulty.



The patient needs to go for open heart surgery within the next day or 2 and he is still going to need
 a [place]-Haroldo catheter therefore I a infiltrated the area of the left subclavian with Xylocaine. This 
had been previously prepped and draped in the usual fashion.



Using Seldinger technique a sheath was inserted into the subclavian vein.



The sheath had good back flow.



A [place]-Haroldo catheter was then advanced all the way to the RA then across the tricuspid valve into the
 RV and out into the pulmonary artery.



A PA pressure was 28/7 mm/Hg  and the mean pulmonary wedge pressure was 5 mmHg.



The [place] and the sheath were sutured in place.



Dressings were applied to the [place], the sheath, the arterial line in the right wrist.



A dressing was applied over the left side of the neck to call for the sheath that was present there.



The patient appeared to be in stable condition.



He moves all extremities and is talking appropriately.



Does not appear to be any neurological deficit.



He was taken to the surgery holding area to wait for the vascular surgeon to get here and then go for
 surgery with a cutdown remove the sheath and repair the artery.



I have discussed all of this with the patient as well as his wife and his daughter.







Conclusion

Insertion of the sheath in the carotid artery and is being sent to surgery to remove this and repair 
the artery.



The [place]-Haroldo catheter appears to be working normally after inserting it from the left subclavian vei
n.



The arterial line appears to be functioning normally.



I will discuss the situation with the CV surgeon.

## 2017-10-02 NOTE — PDOC
Renal-Progress Notes


Subjective Notes


Notes


NERVOUS ABOUT SURGERY





History of Present Illness


Hx of present illness


STABLE





Vitals


Vitals





Vital Signs








  Date Time  Temp Pulse Resp B/P (MAP) Pulse Ox O2 Delivery O2 Flow Rate FiO2


 


10/2/17 08:00      Room Air  


 


10/2/17 07:00 98.2 80 18 148/75 (99) 95   





 98.2       


 


10/2/17 06:57       2.0 








Weight


Weight [ ]





Labs


Labs





Laboratory Tests








Test


  10/1/17


10:59 10/1/17


16:19 10/1/17


17:33 10/1/17


20:48


 


Glucose (Fingerstick)


  268 mg/dL


(70-99) 145 mg/dL


(70-99) 125 mg/dL


(70-99) 188 mg/dL


(70-99)


 


Test


  10/2/17


04:53 10/2/17


08:21 


  


 


 


White Blood Count


  8.5 x10^3/uL


(4.0-11.0) 


  


  


 


 


Red Blood Count


  3.99 x10^6/uL


(4.30-5.70) 


  


  


 


 


Hemoglobin


  11.1 g/dL


(13.0-17.5) 


  


  


 


 


Hematocrit


  33.5 %


(39.0-53.0) 


  


  


 


 


Mean Corpuscular Volume 84 fL ()    


 


Mean Corpuscular Hemoglobin 28 pg (25-35)    


 


Mean Corpuscular Hemoglobin


Concent 33 g/dL


(31-37) 


  


  


 


 


Red Cell Distribution Width


  13.9 %


(11.5-14.5) 


  


  


 


 


Platelet Count


  290 x10^3/uL


(140-400) 


  


  


 


 


Neutrophils (%) (Auto) 41 % (31-73)    


 


Lymphocytes (%) (Auto) 44 % (24-48)    


 


Monocytes (%) (Auto) 9 % (0-9)    


 


Eosinophils (%) (Auto) 5 % (0-3)    


 


Basophils (%) (Auto) 1 % (0-3)    


 


Neutrophils # (Auto)


  3.5 x10^3uL


(1.8-7.7) 


  


  


 


 


Lymphocytes # (Auto)


  3.7 x10^3/uL


(1.0-4.8) 


  


  


 


 


Monocytes # (Auto)


  0.8 x10^3/uL


(0.0-1.1) 


  


  


 


 


Eosinophils # (Auto)


  0.5 x10^3/uL


(0.0-0.7) 


  


  


 


 


Basophils # (Auto)


  0.1 x10^3/uL


(0.0-0.2) 


  


  


 


 


Prothrombin Time


  13.1 SEC


(11.7-14.0) 


  


  


 


 


Prothromb Time International


Ratio 1.1 (0.8-1.1) 


  


  


  


 


 


Activated Partial


Thromboplast Time 37 SEC (24-38) 


  


  


  


 


 


Sodium Level


  135 mmol/L


(136-145) 


  


  


 


 


Potassium Level


  3.5 mmol/L


(3.5-5.1) 


  


  


 


 


Chloride Level


  93 mmol/L


() 


  


  


 


 


Carbon Dioxide Level


  28 mmol/L


(21-32) 


  


  


 


 


Anion Gap 14 (6-14)    


 


Blood Urea Nitrogen


  70 mg/dL


(8-26) 


  


  


 


 


Creatinine


  5.9 mg/dL


(0.7-1.3) 


  


  


 


 


Estimated GFR


(Cockcroft-Gault) 10.1 


  


  


  


 


 


BUN/Creatinine Ratio 12 (6-20)    


 


Glucose Level


  89 mg/dL


(70-99) 


  


  


 


 


Calcium Level


  9.2 mg/dL


(8.5-10.1) 


  


  


 


 


Total Bilirubin


  0.3 mg/dL


(0.2-1.0) 


  


  


 


 


Aspartate Amino Transf


(AST/SGOT) 21 U/L (15-37) 


  


  


  


 


 


Alanine Aminotransferase


(ALT/SGPT) 27 U/L (16-63) 


  


  


  


 


 


Alkaline Phosphatase


  110 U/L


() 


  


  


 


 


Total Protein


  8.8 g/dL


(6.4-8.2) 


  


  


 


 


Albumin


  4.0 g/dL


(3.4-5.0) 


  


  


 


 


Albumin/Globulin Ratio 0.8 (1.0-1.7)    


 


Glucose (Fingerstick)


  


  163 mg/dL


(70-99) 


  


 











Review of Systems


Constitutional:  yes: weakness, alert, oriented


Ears/Nose/Throat:  Yes: no symptom reported


Pulmonary:  Yes no symptom reported


Cardiovascular:  Yes no symptom reported


Genitourinary:  Yes: no symptom reported


Musculoskeletal:  Yes: muscle stiffness


Psychiatric/Neurological:  Yes: no symptom reported


Endocrine:  Yes: no symptom reported





Physical Exam


General Appearance:  no apparent distress


Skin:  warm


Respiratory:  bilateral CTA


Heart:  S1S2, RRR


Abdomen:  soft, bowel sounds present


Genitourinary:  bladder flat


Extremities:  pulses present


Neurology:  alert, oriented





Assessment


Assessment


IMP





CHEST PAIN


SEVERE CAD


ANEMIA


DM II


HTN


ESRD





PLAN





HD TODAY


UF TO DW


CABG TUYAMILETDAY











RACHEAL GUERRERO MD Oct 2, 2017 10:44

## 2017-10-02 NOTE — PDOC
Provider Note


Provider Note


vss, glucose ok, labs ok- d/w dr moe, surg in am











ARIC MATUTE MD Oct 2, 2017 08:38

## 2017-10-02 NOTE — PDOC
Provider Note


Provider Note


(please see full dictation)


54 yo male with CAD and ESRD had a left neck line placed that was mistakenly 

placed in the left common carotid artery.  I discussed options of care with Dr. Mcmanus, pt and family.  I feel the safest course is surgical removal of the 

catheter with primary repair of the carotid artery.  I discussed the risks and 

benefits with him.  He acknowledged and elected to proceed.











RICO TAVERA MD Oct 2, 2017 17:50

## 2017-10-02 NOTE — PDOC
BRIEF OPERATIVE NOTE


Pre-Op Diagnosis


Left neck cordis sheath placed into the carotid artery


CAD


ESRD on dialysis


Post-Op Diagnosis


same


Procedure Performed


Left neck exploration with primary repair of the left common carotid artery


Removal of left neck cordis sheath


Surgeon


Erick Tavera MD


Assistant


INDIA Hernandez


Anesthesia Type:  General


Blood Loss


15ml


Findings


Posterior puncture in the left mid cervical common carotid artery


Mild plaque in the carotid bifurcation


Complications


none











ERICK TAVERA MD Oct 2, 2017 17:54

## 2017-10-03 NOTE — OP
DATE OF SURGERY:  



PREOPERATIVE DIAGNOSES:

1.  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

2.  Coronary artery disease.

3.  Chronic renal failure, on hemodialysis.

4.  Diabetes.



POSTOPERATIVE DIAGNOSES:

1.  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

2.  Coronary artery disease.

3.  Chronic renal failure, on hemodialysis.

4.  Diabetes.



PROCEDURE:  Left neck exploration with removal of Cordis sheath and primary

repair of common carotid artery.



SURGEON:  Erick Tavera MD



ASSISTANT:  INDIA Hernandez



ANESTHESIA:  General.



INDICATIONS:  The patient is a 53-year-old male with diabetes and chronic renal

failure, on hemodialysis.  He was found to have symptomatic coronary artery

disease for which he is scheduled for a coronary artery bypass.  Dr. Mcmanus

was placing a pulmonary artery catheter today when he inadvertently cannulated

the left carotid artery with placement of a 9-D[place] Cordis sheath.  He is

taking to the operating room for surgical control and repair of carotid injury.



FINDINGS:  Ultrasound prior to prepping and draping showed the Cordis sheath

entering the posterior aspect of the left common carotid artery.  There appeared

to be some thrombus around the distal portion of the catheter.  He was given

10,000 unit bolus of IV heparin.



The left neck was then prepped and draped in normal sterile fashion.  Ioban

occlusive dressing placed over the surgical field.  Longitudinal incision was

made over the left neck along the anterior border of the sternocleidomastoid. 

This incision carried down through the subcutaneous tissue.  The

sternocleidomastoid was retracted laterally.  This allowed exposure to the

carotid artery.  The carotid artery was carefully freed from the surrounding

tissue.  There was noted to be a hematoma in the posterior carotid sheath.  The

common carotid artery was encircled close to the carotid bifurcation.  The

external carotid artery was similarly encircled.  Because of the concern of some

thrombus at the distal portion of the sheath tip, the common carotid artery was

controlled by tightening a Hernandez tied vessel loop distally.  Dissection was

continued down to expose the tip of the Cordis sheath as it was entering into

the posterior aspect of the common carotid artery.  This sheath was actually

just outside of the common carotid artery after manipulation.  The more proximal

common carotid artery was mobilized and clamped.  The area of posterior common

carotid artery puncture was opened transversely to inspect the common carotid

lumen.  There was a posterior area of dissection at the puncture site.  This was

primarily repaired using a running 6-0 Prolene suture as the arteriotomy was

closed.  Prior to completion of the closure, the artery was backward and forward

flushed.  The arterial closure completed and flow reconstituted through the

carotid artery.  Flow was initially directed to the external carotid artery and

finally to the internal carotid artery.  There was good hemostasis.  The

incision was copiously irrigated with saline irrigation.  Platysma was

reapproximated with a running 2-0 Vicryl suture.  Skin was reapproximated with

running subcuticular 4-0 Vicryl.  Mastisol and Steri-Strips were applied and a

sterile dressing placed over the wound.  The Cordis sheath was removed.  As the

surgical dressing was placed, direct pressure held over the puncture site.



ESTIMATED BLOOD LOSS:  15 mL



SPECIMEN:  None.

 



______________________________

ERICK TAVERA MD



DR:  [email or web address]/[email or web address]  JOB#:  [id number] / [id number]

DD:  10/02/2017 18:15  DT:  10/02/2017 19:34



ARIC Jin MD, HECTOR MD TSIOURIS, ATHANASIOS MD

## 2017-10-03 NOTE — PDOC
PROGRESS NOTES


Subjective


Subjective


"My neck hurts.  I don't do pain very well."





Objective


Objective


Vascular Surgery - POD#1 Repair left common carotid artery with removal of 

sheath.





O:  Lying in bed with eyes closed.  Easily awakened.  Complains of left neck 

pain.  Pain medication helps.


Neuro:  Awake and oriented.  No deficits noted.  Able to eat and drink without 

difficulty.  Strong grasp.  No vocal changes.


Left neck:  dressing removed.  Incision intact.  No bleeding present.  Moderate 

hematoma at incision line.  Surrounding tissue is soft.  No ecchymosis present.


Resp:  Unlabored.  No stridor or diff with respirations.


Palpable left radial pulse.





Assessment/Plan:


1.   Inadvertent cannulation of the left carotid artery with a 6-[place] 

sheath - POD#1 Repair of left common carotid artery with removal of sheath.  


2.   Neurologically intact.


3.   Left neck swelling with moderate hematoma.  No respiratory or swallowing 

compromise.  Add ice pack to neck and keep HOB elevated to assist with 

reduction of swelling.  


4.   Antiplatelet therapy on hold in anticipation for CABG.














Vital Signs








  Date Time  Temp Pulse Resp B/P (MAP) Pulse Ox O2 Delivery O2 Flow Rate FiO2


 


10/3/17 10:00  85 18 137/66 (89) 100 Room Air  


 


10/3/17 07:00 98.1      2.0 





 98.1       














Intake and Output 


 


 10/4/17





 07:00


 


Intake Total 270 ml


 


Output Total 77 ml


 


Balance 193 ml


 


 


 


Intake Oral 270 ml


 


Output Urine Total 77 ml











Assessment


Assessment


Problems


Medical Problems:


(1) Chest pain


Status: Acute  











Comment


Review of Relevant


I have reviewed the following items juan (where applicable) has been applied.


Labs





Laboratory Tests








Test


  10/1/17


16:19 10/1/17


17:33 10/1/17


20:48 10/2/17


04:53


 


Glucose (Fingerstick)


  145 mg/dL


(70-99) 125 mg/dL


(70-99) 188 mg/dL


(70-99) 


 


 


White Blood Count


  


  


  


  8.5 x10^3/uL


(4.0-11.0)


 


Red Blood Count


  


  


  


  3.99 x10^6/uL


(4.30-5.70)


 


Hemoglobin


  


  


  


  11.1 g/dL


(13.0-17.5)


 


Hematocrit


  


  


  


  33.5 %


(39.0-53.0)


 


Mean Corpuscular Volume    84 fL () 


 


Mean Corpuscular Hemoglobin    28 pg (25-35) 


 


Mean Corpuscular Hemoglobin


Concent 


  


  


  33 g/dL


(31-37)


 


Red Cell Distribution Width


  


  


  


  13.9 %


(11.5-14.5)


 


Platelet Count


  


  


  


  290 x10^3/uL


(140-400)


 


Neutrophils (%) (Auto)    41 % (31-73) 


 


Lymphocytes (%) (Auto)    44 % (24-48) 


 


Monocytes (%) (Auto)    9 % (0-9) 


 


Eosinophils (%) (Auto)    5 % (0-3) 


 


Basophils (%) (Auto)    1 % (0-3) 


 


Neutrophils # (Auto)


  


  


  


  3.5 x10^3uL


(1.8-7.7)


 


Lymphocytes # (Auto)


  


  


  


  3.7 x10^3/uL


(1.0-4.8)


 


Monocytes # (Auto)


  


  


  


  0.8 x10^3/uL


(0.0-1.1)


 


Eosinophils # (Auto)


  


  


  


  0.5 x10^3/uL


(0.0-0.7)


 


Basophils # (Auto)


  


  


  


  0.1 x10^3/uL


(0.0-0.2)


 


Prothrombin Time


  


  


  


  13.1 SEC


(11.7-14.0)


 


Prothromb Time International


Ratio 


  


  


  1.1 (0.8-1.1) 


 


 


Activated Partial


Thromboplast Time 


  


  


  37 SEC (24-38) 


 


 


Sodium Level


  


  


  


  135 mmol/L


(136-145)


 


Potassium Level


  


  


  


  3.5 mmol/L


(3.5-5.1)


 


Chloride Level


  


  


  


  93 mmol/L


()


 


Carbon Dioxide Level


  


  


  


  28 mmol/L


(21-32)


 


Anion Gap    14 (6-14) 


 


Blood Urea Nitrogen


  


  


  


  70 mg/dL


(8-26)


 


Creatinine


  


  


  


  5.9 mg/dL


(0.7-1.3)


 


Estimated GFR


(Cockcroft-Gault) 


  


  


  10.1 


 


 


BUN/Creatinine Ratio    12 (6-20) 


 


Glucose Level


  


  


  


  89 mg/dL


(70-99)


 


Calcium Level


  


  


  


  9.2 mg/dL


(8.5-10.1)


 


Total Bilirubin


  


  


  


  0.3 mg/dL


(0.2-1.0)


 


Aspartate Amino Transf


(AST/SGOT) 


  


  


  21 U/L (15-37) 


 


 


Alanine Aminotransferase


(ALT/SGPT) 


  


  


  27 U/L (16-63) 


 


 


Alkaline Phosphatase


  


  


  


  110 U/L


()


 


Total Protein


  


  


  


  8.8 g/dL


(6.4-8.2)


 


Albumin


  


  


  


  4.0 g/dL


(3.4-5.0)


 


Albumin/Globulin Ratio    0.8 (1.0-1.7) 


 


Test


  10/2/17


08:21 10/2/17


15:49 10/2/17


18:28 10/3/17


05:50


 


Glucose (Fingerstick)


  163 mg/dL


(70-99) 223 mg/dL


(70-99) 234 mg/dL


(70-99) 


 


 


White Blood Count


  


  


  


  9.4 x10^3/uL


(4.0-11.0)


 


Red Blood Count


  


  


  


  3.64 x10^6/uL


(4.30-5.70)


 


Hemoglobin


  


  


  


  10.4 g/dL


(13.0-17.5)


 


Hematocrit


  


  


  


  30.1 %


(39.0-53.0)


 


Mean Corpuscular Volume    83 fL () 


 


Mean Corpuscular Hemoglobin    29 pg (25-35) 


 


Mean Corpuscular Hemoglobin


Concent 


  


  


  35 g/dL


(31-37)


 


Red Cell Distribution Width


  


  


  


  13.8 %


(11.5-14.5)


 


Platelet Count


  


  


  


  269 x10^3/uL


(140-400)


 


Test


  10/3/17


07:50 10/3/17


07:51 


  


 


 


Sodium Level


  132 mmol/L


(136-145) 


  


  


 


 


Potassium Level


  5.1 mmol/L


(3.5-5.1) 


  


  


 


 


Chloride Level


  96 mmol/L


() 


  


  


 


 


Carbon Dioxide Level


  24 mmol/L


(21-32) 


  


  


 


 


Anion Gap 12 (6-14)    


 


Blood Urea Nitrogen


  43 mg/dL


(8-26) 


  


  


 


 


Creatinine


  4.7 mg/dL


(0.7-1.3) 


  


  


 


 


Estimated GFR


(Cockcroft-Gault) 13.1 


  


  


  


 


 


Glucose Level


  263 mg/dL


(70-99) 


  


  


 


 


Calcium Level


  8.4 mg/dL


(8.5-10.1) 


  


  


 


 


Glucose (Fingerstick)


  


  254 mg/dL


(70-99) 


  


 








Laboratory Tests








Test


  10/2/17


15:49 10/2/17


18:28 10/3/17


05:50 10/3/17


07:50


 


Glucose (Fingerstick)


  223 mg/dL


(70-99) 234 mg/dL


(70-99) 


  


 


 


White Blood Count


  


  


  9.4 x10^3/uL


(4.0-11.0) 


 


 


Red Blood Count


  


  


  3.64 x10^6/uL


(4.30-5.70) 


 


 


Hemoglobin


  


  


  10.4 g/dL


(13.0-17.5) 


 


 


Hematocrit


  


  


  30.1 %


(39.0-53.0) 


 


 


Mean Corpuscular Volume   83 fL ()  


 


Mean Corpuscular Hemoglobin   29 pg (25-35)  


 


Mean Corpuscular Hemoglobin


Concent 


  


  35 g/dL


(31-37) 


 


 


Red Cell Distribution Width


  


  


  13.8 %


(11.5-14.5) 


 


 


Platelet Count


  


  


  269 x10^3/uL


(140-400) 


 


 


Sodium Level


  


  


  


  132 mmol/L


(136-145)


 


Potassium Level


  


  


  


  5.1 mmol/L


(3.5-5.1)


 


Chloride Level


  


  


  


  96 mmol/L


()


 


Carbon Dioxide Level


  


  


  


  24 mmol/L


(21-32)


 


Anion Gap    12 (6-14) 


 


Blood Urea Nitrogen


  


  


  


  43 mg/dL


(8-26)


 


Creatinine


  


  


  


  4.7 mg/dL


(0.7-1.3)


 


Estimated GFR


(Cockcroft-Gault) 


  


  


  13.1 


 


 


Glucose Level


  


  


  


  263 mg/dL


(70-99)


 


Calcium Level


  


  


  


  8.4 mg/dL


(8.5-10.1)


 


Test


  10/3/17


07:51 


  


  


 


 


Glucose (Fingerstick)


  254 mg/dL


(70-99) 


  


  


 








Medications





Current Medications


Iodixanol (Visipaque 320) 100 ml STK-MED ONCE .ROUTE ;  Start 9/27/17 at 11:27;

  Stop 9/27/17 at 11:28;  Status DC


Lidocaine HCl 20 ml STK-MED ONCE .ROUTE ;  Start 9/27/17 at 11:27;  Stop 9/27/ 17 at 11:28;  Status DC


Heparin Sodium/ Sodium Chloride 1,000 ml @  As Directed STK-MED ONCE .ROUTE ;  

Start 9/27/17 at 11:28;  Stop 9/27/17 at 11:29;  Status DC


Ondansetron HCl (Zofran) 4 mg PRN Q8HRS  PRN IV NAUSEA/VOMITING;  Start 9/27/17 

at 12:30;  Stop 9/28/17 at 12:29;  Status DC


Fentanyl Citrate (Fentanyl 2ml Vial) 100 mcg STK-MED ONCE .ROUTE ;  Start 9/27/ 17 at 12:33;  Stop 9/27/17 at 12:34;  Status DC


Midazolam HCl (Versed) 5 mg STK-MED ONCE .ROUTE ;  Start 9/27/17 at 12:33;  

Stop 9/27/17 at 12:34;  Status DC


Heparin Sodium (Porcine) (Heparin Sodium) 10,000 unit STK-MED ONCE .ROUTE ;  

Start 9/27/17 at 12:59;  Stop 9/27/17 at 13:00;  Status DC


Heparin Sodium/ Dextrose 500 ml @  As Directed STK-MED ONCE IV ;  Start 9/27/17 

at 13:21;  Stop 9/27/17 at 13:22;  Status DC


Heparin Sodium/ Sodium Chloride 1,000 unit 1X  ONCE IART  Last administered on 9 /27/17at 13:40;  Start 9/27/17 at 13:00;  Stop 9/27/17 at 13:47;  Status DC


Midazolam HCl (Versed) 5 mg 1X  ONCE IV  Last administered on 9/27/17at 13:41;  

Start 9/27/17 at 13:00;  Stop 9/27/17 at 13:47;  Status DC


Fentanyl Citrate (Fentanyl 2ml Vial) 100 mcg 1X  ONCE IV  Last administered on 9 /27/17at 13:42;  Start 9/27/17 at 13:00;  Stop 9/27/17 at 13:47;  Status DC


Iodixanol (Visipaque 320) 100 ml 1X  ONCE IART  Last administered on 9/27/17at 

13:40;  Start 9/27/17 at 13:00;  Stop 9/27/17 at 13:47;  Status DC


Heparin Sodium (Porcine) (Heparin Sodium) 8,000 unit 1X  ONCE IV  Last 

administered on 9/27/17at 13:43;  Start 9/27/17 at 13:00;  Stop 9/27/17 at 13:47

;  Status DC


Lidocaine HCl 20 ml 1X  ONCE IJ  Last administered on 9/27/17at 13:40;  Start 9/ 27/17 at 13:00;  Stop 9/27/17 at 13:47;  Status DC


Heparin Sodium (Porcine) (Heparin 5,000 Units/1,000ml NS) 5,000 unit 1X  ONCE 

IV  Last administered on 9/27/17at 13:00;  Start 9/27/17 at 13:00;  Stop 9/27/ 17 at 13:47;  Status DC


Heparin Sodium/ Dextrose 500 ml @  20 mls/hr CONT PRN  PRN IV SEE COMMENTS Last 

administered on 9/27/17at 13:45;  Start 9/27/17 at 13:00;  Stop 9/28/17 at 09:04

;  Status DC


Oxycodone/ Acetaminophen (Percocet 10/325) 1 tab PRN Q6HRS  PRN PO PAIN Last 

administered on 9/30/17at 01:11;  Start 9/27/17 at 16:45;  Stop 9/30/17 at 12:30

;  Status DC


Cyclobenzaprine HCl (Flexeril) 10 mg PRN Q6HRS  PRN PO MUSCLE SPASMS Last 

administered on 10/3/17at 08:48;  Start 9/27/17 at 16:45


Diazepam (Valium) 5 mg PRN Q6HRS  PRN PO ANXIETY Last administered on 10/1/17at 

08:55;  Start 9/27/17 at 16:45;  Stop 10/1/17 at 12:32;  Status DC


Polyethylene Glycol (miraLAX PACKET) 17 gm DAILY PO ;  Start 9/28/17 at 09:00;  

Stop 9/28/17 at 09:00;  Status DC


Polyethylene Glycol (miraLAX PACKET) 17 gm DAILY PO  Last administered on 10/3/

17at 08:46;  Start 9/27/17 at 21:00


Fentanyl Citrate (Fentanyl 2ml Vial) 50 mcg PRN Q3HRS  PRN IV SEVERE PAIN Last 

administered on 10/3/17at 05:20;  Start 9/28/17 at 01:45


Zolpidem Tartrate (Ambien) 5 mg PRN QHS  PRN PO INSOMNIA, MAY REPEAT IN 1HR 

Last administered on 9/28/17at 21:06;  Start 9/28/17 at 09:30


Cefazolin Sodium/ Dextrose 50 ml @  100 mls/hr 1X  ONCE IV ;  Start 10/3/17 at 

06:00;  Stop 10/3/17 at 06:29;  Status DC


Amlodipine Besylate (Norvasc) 10 mg DAILY PO  Last administered on 10/3/17at 08:

48;  Start 9/28/17 at 11:00


Aspirin (Myles Aspirin) 325 mg DAILY PO ;  Start 9/28/17 at 11:00;  Stop 9/28/ 17 at 11:00;  Status DC


Clopidogrel Bisulfate (Plavix) 75 mg DAILY PO ;  Start 9/28/17 at 11:00;  Stop 9 /28/17 at 11:00;  Status DC


Isosorbide Mononitrate (Imdur) 30 mg DAILY PO  Last administered on 10/3/17at 08

:48;  Start 9/28/17 at 11:00


Losartan Potassium (Cozaar) 100 mg DAILY08 PO ;  Start 9/28/17 at 11:00;  Stop 9 /28/17 at 11:00;  Status DC


Oxycodone/ Acetaminophen (Percocet 10/325) 1 tab QID PO  Last administered on 10

/3/17at 08:47;  Start 9/28/17 at 13:00


Ranolazine (Ranexa) 500 mg BID PO  Last administered on 10/3/17at 08:47;  Start 

9/28/17 at 11:00


Sodium Bicarbonate (Sodium Bicarbonate) 650 mg TID PO  Last administered on 10/3

/17at 08:46;  Start 9/28/17 at 14:00


Oxycodone/ Acetaminophen (Percocet 10/325) 1 tab PRN Q6HRS  PRN PO PAIN Last 

administered on 10/1/17at 08:45;  Start 9/28/17 at 10:30


Darbepoetin Benny (Aranesp) 60 mcg Th SQ  Last administered on 9/28/17at 21:02;  

Start 9/28/17 at 21:00


Insulin Aspart (NovoLOG) 10 units 1X  ONCE SQ  Last administered on 9/28/17at 21

:40;  Start 9/28/17 at 22:00;  Stop 9/28/17 at 22:01;  Status DC


Insulin Detemir (Levemir) 40 units BID SQ  Last administered on 9/30/17at 09:20

;  Start 9/28/17 at 22:00;  Stop 9/30/17 at 12:31;  Status DC


Sodium Chloride 1,000 ml @  1,000 mls/hr Q1H PRN IV hypotension;  Start 9/29/17 

at 06:52;  Stop 9/29/17 at 12:51;  Status DC


Sodium Chloride (Normal Saline Flush) 10 ml 1X PRN  PRN IV AP catheter pack;  

Start 9/29/17 at 07:00;  Stop 9/30/17 at 06:59;  Status DC


Sodium Chloride (Normal Saline Flush) 10 ml 1X PRN  PRN IV  catheter pack;  

Start 9/29/17 at 07:00;  Stop 9/30/17 at 06:59;  Status DC


Info (PHARMACY MONITORING -- do not chart) 1 each PRN DAILY  PRN MC SEE COMMENTS

;  Start 9/29/17 at 07:00


Insulin Aspart (NovoLOG) 10 units TIDAC  PRN SQ BLOOD SUGAR > 200 Last 

administered on 10/1/17at 08:51;  Start 9/29/17 at 18:15;  Stop 10/1/17 at 10:29

;  Status DC


Insulin Aspart (NovoLOG) 20 units TIDAC  PRN SQ BLOOD SUGAR > 300 Last 

administered on 9/29/17at 18:34;  Start 9/29/17 at 18:15;  Stop 10/1/17 at 10:29

;  Status DC


Insulin Detemir (Levemir) 45 units BID SQ  Last administered on 10/1/17at 08:50

;  Start 9/30/17 at 21:00;  Stop 10/1/17 at 10:29;  Status DC


Diazepam (Valium) 2 mg PRN Q8HRS  PRN PO ANXIETY;  Start 9/30/17 at 12:30;  

Stop 10/1/17 at 12:32;  Status DC


Insulin Detemir (Levemir) 55 units BID SQ  Last administered on 10/3/17at 08:56

;  Start 10/1/17 at 21:00


Insulin Aspart (NovoLOG) 20 units TIDAC SQ  Last administered on 10/1/17at 18:35

;  Start 10/1/17 at 11:30;  Stop 10/2/17 at 08:37;  Status DC


Insulin Aspart (NovoLOG) 0-9 UNITS TIDWMEALS SQ  Last administered on 10/3/17at 

08:57;  Start 10/1/17 at 12:00


Dextrose (Dextrose 50%-Water Syringe) 12.5 gm PRN Q15MIN  PRN IV SEE COMMENTS;  

Start 10/1/17 at 10:30


Diazepam (Valium) 5 mg 1X  ONCE PO  Last administered on 10/1/17at 11:52;  

Start 10/1/17 at 11:00;  Stop 10/1/17 at 11:01;  Status DC


Diazepam (Valium) 10 mg PRN Q6HRS  PRN PO ANXIETY Last administered on 10/3/

17at 08:46;  Start 10/1/17 at 12:30


Sodium Chloride 1,000 ml @  1,000 mls/hr Q1H PRN IV hypotension;  Start 10/2/17 

at 07:51;  Stop 10/2/17 at 13:50;  Status DC


Info (PHARMACY MONITORING -- do not chart) 1 each PRN DAILY  PRN MC SEE COMMENTS

;  Start 10/2/17 at 08:00;  Status UNV


Insulin Aspart (NovoLOG) 15 units TIDAC SQ  Last administered on 10/3/17at 08:57

;  Start 10/2/17 at 11:30


Ondansetron HCl (Zofran) 4 mg PRN Q6HRS  PRN IV NAUSEA/VOMITING;  Start 10/3/17 

at 07:00;  Stop 10/3/17 at 09:10;  Status DC


Fentanyl Citrate (Fentanyl 2ml Vial) 25 mcg PRN Q5MIN  PRN IV MILD PAIN;  Start 

10/3/17 at 07:00;  Stop 10/4/17 at 06:59;  Status Cancel


Fentanyl Citrate (Fentanyl 2ml Vial) 50 mcg PRN Q5MIN  PRN IV MODERATE PAIN;  

Start 10/3/17 at 07:00;  Stop 10/4/17 at 06:59;  Status Cancel


Morphine Sulfate 1 mg PRN Q10MIN  PRN IV SEVERE PAIN;  Start 10/3/17 at 07:00;  

Stop 10/4/17 at 06:59;  Status Cancel


Ringer's Solution 1,000 ml @  30 mls/hr Q24H IV ;  Start 10/3/17 at 07:00;  

Stop 10/3/17 at 18:59;  Status Cancel


Lidocaine HCl (Xylocaine-Mpf 1% Vial) 2 ml PRN 1X  PRN ID IV START;  Start 10/3/

17 at 07:00;  Stop 10/4/17 at 06:59;  Status Cancel


Hydromorphone HCl (Dilaudid) 0.5 mg PRN Q10MIN  PRN IV SEV PAIN, Second choice;

  Start 10/3/17 at 07:00;  Stop 10/4/17 at 06:59;  Status Cancel


Prochlorperazine Edisylate (Compazine) 5 mg PACU PRN  PRN IV NAUSEA, MRX1;  

Start 10/3/17 at 07:00;  Stop 10/4/17 at 06:59;  Status Cancel


Cefazolin Sodium 1 gm/Sodium Chloride 500 ml @  500 mls/hr 1X PERIOP  ONCE IRR 

;  Start 10/3/17 at 06:00;  Stop 10/3/17 at 06:59;  Status DC


Heparin Sodium (Porcine) 49794 unit/Ringer's Solution 1,020 ml @  1,020 mls/hr 

1X PERIOP  ONCE IRR ;  Start 10/3/17 at 06:00;  Stop 10/3/17 at 06:59;  Status 

DC


Potassium Chloride 70 meq/ Sodium Bicarbonate 12.5 meq/Lidocaine HCl 24 ml/

Parenteral Electrolytes 571.5 ml @  571.5 mls/ hr 1X PERIOP  ONCE IRR ;  Start 

10/3/17 at 06:00;  Stop 10/3/17 at 06:59;  Status DC


Potassium Chloride 15 meq/ Sodium Bicarbonate 12.5 meq/Parenteral Electrolytes 

520 ml @  520 mls/hr 1X PERIOP  ONCE IRR ;  Start 10/3/17 at 06:00;  Stop 10/3/

17 at 06:59;  Status DC


Midazolam HCl (Versed) 2 mg STK-MED ONCE .ROUTE ;  Start 10/2/17 at 13:27;  

Stop 10/2/17 at 13:28;  Status DC


Lidocaine HCl 20 ml STK-MED ONCE .ROUTE ;  Start 10/2/17 at 13:30;  Stop 10/2/

17 at 13:31;  Status DC


Heparin Sodium/ Sodium Chloride 500 ml @  As Directed STK-MED ONCE .ROUTE ;  

Start 10/2/17 at 13:30;  Stop 10/2/17 at 13:31;  Status DC


Heparin Sodium/ Sodium Chloride 1,000 unit 1X  ONCE IART  Last administered on 

10/2/17at 15:17;  Start 10/2/17 at 14:00;  Stop 10/2/17 at 14:01;  Status DC


Midazolam HCl (Versed) 2 mg 1X  ONCE IV  Last administered on 10/2/17at 15:23;  

Start 10/2/17 at 14:00;  Stop 10/2/17 at 14:01;  Status DC


Fentanyl Citrate (Fentanyl 2ml Vial) 100 mcg 1X  ONCE IV  Last administered on 

10/2/17at 15:24;  Start 10/2/17 at 14:00;  Stop 10/2/17 at 14:01;  Status DC


Lidocaine HCl 20 ml 1X  ONCE IJ  Last administered on 10/2/17at 15:16;  Start 10

/2/17 at 14:00;  Stop 10/2/17 at 14:01;  Status DC


Iohexol (Omnipaque 300 Mg/ml) 100 ml STK-MED ONCE .ROUTE ;  Start 10/2/17 at 14:

25;  Stop 10/2/17 at 14:26;  Status DC


Iohexol (Omnipaque 300 Mg/ml) 10 ml 1X  ONCE IART  Last administered on 10/2/

17at 15:17;  Start 10/2/17 at 14:45;  Stop 10/2/17 at 15:06;  Status DC


Info (Do NOT chart on this entry -- for MONITORING) 1 each PRN DAILY  PRN MC 

SEE COMMENTS;  Start 10/2/17 at 15:15;  Stop 10/4/17 at 15:14


Lidocaine HCl 20 ml STK-MED ONCE .ROUTE ;  Start 10/2/17 at 14:06;  Stop 10/2/

17 at 15:06;  Status DC


Cellulose 1 each STK-MED ONCE .ROUTE ;  Start 10/2/17 at 14:06;  Stop 10/2/17 

at 15:06;  Status DC


Protamine Sulfate 50 mg STK-MED ONCE IV ;  Start 10/2/17 at 14:06;  Stop 10/2/

17 at 15:06;  Status DC


Ondansetron HCl (Zofran) 4 mg PRN Q6HRS  PRN IV NAUSEA/VOMITING;  Start 10/2/17 

at 15:15;  Stop 10/3/17 at 15:14;  Status Cancel


Fentanyl Citrate (Fentanyl 2ml Vial) 25 mcg PRN Q5MIN  PRN IV MILD PAIN;  Start 

10/2/17 at 15:15;  Stop 10/3/17 at 15:14;  Status Cancel


Fentanyl Citrate (Fentanyl 2ml Vial) 50 mcg PRN Q5MIN  PRN IV MODERATE PAIN;  

Start 10/2/17 at 15:15;  Stop 10/3/17 at 15:14;  Status Cancel


Morphine Sulfate 1 mg PRN Q10MIN  PRN IV SEVERE PAIN;  Start 10/2/17 at 15:15;  

Stop 10/3/17 at 15:14;  Status Cancel


Ringer's Solution 1,000 ml @  30 mls/hr Q24H IV ;  Start 10/2/17 at 15:08;  

Stop 10/3/17 at 03:07;  Status DC


Lidocaine HCl (Xylocaine-Mpf 1% Vial) 2 ml 1X PRN  PRN ID IV START;  Start 10/2/

17 at 15:15;  Stop 10/3/17 at 15:14;  Status Cancel


Hydromorphone HCl (Dilaudid) 0.5 mg PRN Q10MIN  PRN IV SEV PAIN, Second choice;

  Start 10/2/17 at 15:15;  Stop 10/3/17 at 15:14;  Status Cancel


Prochlorperazine Edisylate (Compazine) 5 mg PACU PRN  PRN IV NAUSEA, MRX1;  

Start 10/2/17 at 15:15;  Stop 10/3/17 at 15:14;  Status Cancel


Heparin Sodium (Porcine) 5000 unit/Ringer's Solution 505 ml @  505 mls/hr 1X 

PERIOP  ONCE IRR  Last administered on 10/2/17at 16:44;  Start 10/2/17 at 15:30

;  Stop 10/2/17 at 16:29;  Status DC


Etomidate (Amidate) 20 mg STK-MED ONCE IV ;  Start 10/2/17 at 15:40;  Stop 10/2/

17 at 15:41;  Status DC


Rocuronium Bromide (Zemuron) 100 mg STK-MED ONCE .ROUTE ;  Start 10/2/17 at 15:

40;  Stop 10/2/17 at 15:41;  Status DC


Cefazolin Sodium/ Dextrose 50 ml @ As Directed STK-MED ONCE IV ;  Start 10/2/17 

at 16:12;  Stop 10/2/17 at 16:13;  Status DC


Lidocaine HCl (Lidocaine Pf 2% Vial) 5 ml STK-MED ONCE .ROUTE ;  Start 10/2/17 

at 16:14;  Stop 10/2/17 at 16:15;  Status DC


Heparin Sodium (Porcine) (Heparin Sodium) 10,000 unit STK-MED ONCE .ROUTE ;  

Start 10/2/17 at 16:30;  Stop 10/2/17 at 16:31;  Status DC


Desflurane (Suprane) 30 ml STK-MED ONCE IH ;  Start 10/2/17 at 16:46;  Stop 10/2

/17 at 16:47;  Status DC


Ondansetron HCl (Zofran) 4 mg STK-MED ONCE .ROUTE ;  Start 10/2/17 at 17:30;  

Stop 10/2/17 at 17:31;  Status DC


Neostigmine Methylsulfate (Bloxiverz) 10 mg STK-MED ONCE .ROUTE ;  Start 10/2/

17 at 17:30;  Stop 10/2/17 at 17:31;  Status DC


Glycopyrrolate (Robinul) 1 mg STK-MED ONCE .ROUTE ;  Start 10/2/17 at 17:30;  

Stop 10/2/17 at 17:31;  Status DC


Labetalol HCl (Normodyne) 10 mg 1X  ONCE IVP  Last administered on 10/2/17at 18:

31;  Start 10/2/17 at 18:30;  Stop 10/2/17 at 18:31;  Status DC


Labetalol HCl (Normodyne) 10 mg PRN Q4HRS  PRN IVP HYPERTENSION, SEE COMMENTS;  

Start 10/2/17 at 18:45


Ondansetron HCl (Zofran) 4 mg PRN Q6HRS  PRN IV NAUSEA/VOMITING;  Start 10/4/17 

at 07:00;  Stop 10/5/17 at 06:59


Fentanyl Citrate (Fentanyl 2ml Vial) 25 mcg PRN Q5MIN  PRN IV MILD PAIN;  Start 

10/4/17 at 07:00;  Stop 10/5/17 at 06:59


Fentanyl Citrate (Fentanyl 2ml Vial) 50 mcg PRN Q5MIN  PRN IV MODERATE PAIN;  

Start 10/4/17 at 07:00;  Stop 10/5/17 at 06:59


Morphine Sulfate 1 mg PRN Q10MIN  PRN IV SEVERE PAIN;  Start 10/4/17 at 07:00;  

Stop 10/5/17 at 06:59


Ringer's Solution 1,000 ml @  30 mls/hr Q24H IV ;  Start 10/4/17 at 07:00;  

Stop 10/4/17 at 18:59


Lidocaine HCl (Xylocaine-Mpf 1% Vial) 2 ml PRN 1X  PRN ID IV START;  Start 10/4/

17 at 07:00;  Stop 10/5/17 at 06:59


Hydromorphone HCl (Dilaudid) 0.5 mg PRN Q10MIN  PRN IV SEV PAIN, Second choice;

  Start 10/4/17 at 07:00;  Stop 10/5/17 at 06:59


Prochlorperazine Edisylate (Compazine) 5 mg PACU PRN  PRN IV NAUSEA, MRX1;  

Start 10/4/17 at 07:00;  Stop 10/5/17 at 06:59





Active Scripts


Active


Sodium Bicarbonate 650 Mg Tablet 650 Mg PO TID


Cozaar (Losartan Potassium) 50 Mg Tablet 100 Mg PO DAILY08


Gabapentin 300 Mg Capsule 300 Mg PO DAILY


Reported


Isosorbide Mononitrate 20 Mg Tablet 60 Mg PO DAILY


Clopidogrel (Clopidogrel Bisulfate) 75 Mg Tablet 1 Tab PO DAILY


Aspirin 325 Mg Tablet 1 Tab PO DAILY


Ranexa (Ranolazine) 500 Mg Tab.er.12h 1 Tab PO BID


Humulin N Kwikpen (Nph, Human Insulin Isophane) 100 Unit/1 Ml Insuln.pen 40 

Unit SQ BIDAC


Percocet  Mg Tablet (Oxycodone/Acetaminophen) 1 Each Tablet 1 Tab PO QID


Amlodipine Besylate 10 Mg Tablet 10 Mg PO DAILY


Ventolin Hfa Inhaler (Albuterol Sulfate) 18 Gm Hfa.aer.ad 2 Puff INH Q4HRS PRN


Carvedilol 25 Mg Tablet 25 Mg PO BID


Vitals/I & O





Vital Sign - Last 24 Hours








 10/2/17 10/2/17 10/2/17 10/2/17





 15:24 15:45 15:48 15:52


 


Temp  99.8  





  99.8  


 


Pulse  90 79 


 


Resp 19 20 19 20


 


B/P (MAP)  221/92  


 


Pulse Ox 98 98 98 99


 


O2 Delivery Nasal Cannula  Nasal Cannula Room Air


 


O2 Flow Rate 2.0  2.0 


 


    





    





 10/2/17 10/2/17 10/2/17 10/2/17





 18:00 18:31 19:00 19:36


 


Temp 97.6   





 97.6   


 


Pulse 83 90 75 


 


Resp 16  18 16


 


B/P (MAP) 200/82 (121) 200/82 154/78 (103) 


 


Pulse Ox 98  100 100


 


O2 Delivery Room Air  Simple Mask Simple Mask


 


O2 Flow Rate   7.0 7.0


 


    





    





 10/2/17 10/2/17 10/2/17 10/2/17





 20:00 20:00 20:00 20:49


 


Temp  97.9  





  97.9  


 


Pulse  82 82 85


 


Resp  16  


 


B/P (MAP)  143/78 (99) 143/78 (99) 155/79


 


Pulse Ox  100  


 


O2 Delivery Mask Simple Mask  


 


O2 Flow Rate 7.0 7.0  


 


    





    





 10/2/17 10/2/17 10/2/17 10/2/17





 20:50 21:00 22:00 22:58


 


Pulse  80 78 


 


Resp 16 16 15 16


 


B/P (MAP)  144/74 (97) 138/76 (96) 


 


Pulse Ox 100 100 100 100


 


O2 Delivery Nasal Cannula Room Air Room Air Room Air


 


O2 Flow Rate 2.0   





 10/2/17 10/3/17 10/3/17 10/3/17





 23:00 00:00 00:00 00:00


 


Temp   98.2 





   98.2 


 


Pulse 82 82 82 


 


Resp 16  16 


 


B/P (MAP) 132/82 (99) 142/80 (100) 142/80 (100) 


 


Pulse Ox 100  100 


 


O2 Delivery Room Air  Room Air Room Air


 


    





    





 10/3/17 10/3/17 10/3/17 10/3/17





 01:00 01:51 02:00 03:00


 


Pulse 82  86 88


 


Resp 12 15 16 21


 


B/P (MAP) 149/73 (98)  148/72 (97) 154/90 (111)


 


Pulse Ox 100 100 100 100


 


O2 Delivery Room Air Room Air Room Air Room Air





 10/3/17 10/3/17 10/3/17 10/3/17





 03:57 04:00 04:00 05:00


 


Temp   97.9 





   97.9 


 


Pulse  84 84 85


 


Resp   19 12


 


B/P (MAP)  144/79 (100) 144/79 (100) 132/84 (100)


 


Pulse Ox   100 100


 


O2 Delivery Room Air  Room Air Room Air


 


    





    





 10/3/17 10/3/17 10/3/17 10/3/17





 05:20 05:50 06:00 07:00


 


Temp    98.1





    98.1


 


Pulse   83 84


 


Resp 12 16 10 18


 


B/P (MAP)   149/73 (98) 126/86 (99)


 


Pulse Ox 100 100 100 96


 


O2 Delivery Room Air Room Air Room Air Room Air


 


O2 Flow Rate    2.0


 


    





    





 10/3/17 10/3/17 10/3/17 10/3/17





 08:00 08:00 08:47 08:47


 


Pulse 85   85


 


Resp 18  16 


 


B/P (MAP) 140/70 (93)   140/70


 


Pulse Ox 100  100 


 


O2 Delivery Room Air Room Air Room Air 





 10/3/17 10/3/17 10/3/17 10/3/17





 08:48 08:48 08:58 09:40


 


Pulse 85 85 96 


 


Resp   18 18


 


B/P (MAP) 140/70 140/70 166/70 (102) 


 


Pulse Ox   100 100


 


O2 Delivery   Room Air Room Air





 10/3/17   





 10:00   


 


Pulse 85   


 


Resp 18   


 


B/P (MAP) 137/66 (89)   


 


Pulse Ox 100   


 


O2 Delivery Room Air   














Intake and Output   


 


 10/3/17 10/3/17 10/4/17





 15:00 23:00 07:00


 


Intake Total 270 ml  


 


Output Total 77 ml  


 


Balance 193 ml  

















ENRIQUE SANTOS Oct 3, 2017 11:21

## 2017-10-03 NOTE — PDOC
Renal-Progress Notes


Subjective Notes


Notes


NO NEW COMPLAINTS





History of Present Illness


Hx of present illness


STABLE





Vitals


Vitals





Vital Signs








  Date Time  Temp Pulse Resp B/P (MAP) Pulse Ox O2 Delivery O2 Flow Rate FiO2


 


10/3/17 11:00 98.7 90 18 160/72 (101) 99 Room Air  





 98.7       


 


10/3/17 07:00       2.0 








Weight


Weight [ ]





I.O.


Intake and Output











Intake and Output 


 


 10/4/17





 07:00


 


Intake Total 300 ml


 


Output Total 157 ml


 


Balance 143 ml


 


 


 


Intake Oral 300 ml


 


Output Urine Total 157 ml











Labs


Labs





Laboratory Tests








Test


  10/2/17


15:49 10/2/17


18:28 10/3/17


05:50 10/3/17


07:50


 


Glucose (Fingerstick)


  223 mg/dL


(70-99) 234 mg/dL


(70-99) 


  


 


 


White Blood Count


  


  


  9.4 x10^3/uL


(4.0-11.0) 


 


 


Red Blood Count


  


  


  3.64 x10^6/uL


(4.30-5.70) 


 


 


Hemoglobin


  


  


  10.4 g/dL


(13.0-17.5) 


 


 


Hematocrit


  


  


  30.1 %


(39.0-53.0) 


 


 


Mean Corpuscular Volume   83 fL ()  


 


Mean Corpuscular Hemoglobin   29 pg (25-35)  


 


Mean Corpuscular Hemoglobin


Concent 


  


  35 g/dL


(31-37) 


 


 


Red Cell Distribution Width


  


  


  13.8 %


(11.5-14.5) 


 


 


Platelet Count


  


  


  269 x10^3/uL


(140-400) 


 


 


Sodium Level


  


  


  


  132 mmol/L


(136-145)


 


Potassium Level


  


  


  


  5.1 mmol/L


(3.5-5.1)


 


Chloride Level


  


  


  


  96 mmol/L


()


 


Carbon Dioxide Level


  


  


  


  24 mmol/L


(21-32)


 


Anion Gap    12 (6-14) 


 


Blood Urea Nitrogen


  


  


  


  43 mg/dL


(8-26)


 


Creatinine


  


  


  


  4.7 mg/dL


(0.7-1.3)


 


Estimated GFR


(Cockcroft-Gault) 


  


  


  13.1 


 


 


Glucose Level


  


  


  


  263 mg/dL


(70-99)


 


Calcium Level


  


  


  


  8.4 mg/dL


(8.5-10.1)


 


Test


  10/3/17


07:51 


  


  


 


 


Glucose (Fingerstick)


  254 mg/dL


(70-99) 


  


  


 











Review of Systems


Constitutional:  yes: weakness, alert, oriented


Ears/Nose/Throat:  Yes: no symptom reported


Pulmonary:  Yes no symptom reported


Cardiovascular:  Yes no symptom reported


Genitourinary:  Yes: no symptom reported


Musculoskeletal:  Yes: muscle stiffness


Psychiatric/Neurological:  Yes: no symptom reported


Endocrine:  Yes: no symptom reported





Physical Exam


General Appearance:  no apparent distress


Skin:  warm


Respiratory:  bilateral CTA


Heart:  S1S2, RRR


Abdomen:  soft, bowel sounds present


Genitourinary:  bladder flat


Extremities:  pulses present


Neurology:  alert, oriented





Assessment


Assessment


IMP





CHEST PAIN


SEVERE CAD


ANEMIA


DM II


HTN


ESRD


LEFT CAROTID ARTERY REPAIR





PLAN





HD TOMORROW


CABG PENDING











RACHEAL GUERRERO MD Oct 3, 2017 11:56

## 2017-10-03 NOTE — CONS
DATE OF CONSULTATION:  



CHIEF COMPLAINT:  Arterial access in the left neck.



HISTORY OF PRESENT ILLNESS:  The patient is a 53-year-old male with diabetes,

hypertension, chronic renal failure on dialysis and coronary artery disease who

was admitted with acute coronary syndrome.  He was to have coronary artery

bypass graft tomorrow.  Dr. Mcmanus was placing a pulmonary artery catheter in

anticipation to his heart surgery.  There is inadvertent cannulation of the left

carotid artery with a 6-Bulgar[place] Cordis sheath.  I was called to assist with

removal of the sheath.  He was without complaints except for some generalized

discomfort.  He denies any unilateral weakness, numbness, visual loss, speech

changes or other TIA or stroke symptoms.



PAST MEDICAL HISTORY:

1.  Chronic renal failure, on hemodialysis.

2.  Diabetes.

3.  Coronary artery disease.

4.  Hypertension.



PAST SURGICAL HISTORY:

1.  Left arm arteriovenous fistula.

2.  Left below knee amputation.



ALLERGIES:  Tunneled dialysis catheter.



CURRENT MEDICATIONS:  Please see detailed medication administration record.



SOCIAL HISTORY:  , nonsmoker, no alcohol use.



FAMILY HISTORY:  Noncontributory.



REVIEW OF SYSTEMS:  No recent fevers, chills.  He has had some generalized

discomfort and some shortness of breath.  No TIA or stroke type symptoms.  There

was some difficulty in access of his recent left arm arteriovenous fistula.  He

is currently doing dialysis through a tunneled dialysis catheter on the right.



PHYSICAL EXAMINATION:

GENERAL:  This is a well-developed, somewhat obese male, in no acute distress.

VITAL SIGNS:  Temperature 99.8, pulse 90, blood pressure was 185/75 this

morning, respirations 18.

NECK:  There is a Cordis sheath entering the posterior lateral left neck a few

centimeters from the left ear.  No significant swelling at this time.

CHEST:  He has a left subclavian sheath in place with a pulmonary artery

catheter.  He has a right internal jugular tunneled catheter exiting the right

chest wall.

EXTREMITIES:  He has a left forearm arteriovenous fistula with palpable thrill. 

He has had a previous left below knee amputation.



IMPRESSION:

1.  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

2.  Coronary artery disease.

3.  Chronic renal failure, on hemodialysis.

4.  Diabetes.



RECOMMENDATION:  I discussed options of care with both Dr. Mcmanus as well as

the patient and his family.  Options would be to pull and hold pressure versus

surgical exploration and primary repair.  I outlined risks and potential

benefits of each with anticipated heart surgery and I feel it is safest to

expose and primarily close the defect in the carotid artery.  I outlined risks

and potential benefits carefully.  He acknowledged and requested to proceed.

 



______________________________

RICO TAVERA MD



DR:  KAH/tanya  JOB#:  [id number] / [id number]

DD:  10/02/2017 18:09  DT:  10/03/2017 01:38



ARIC Jin MD, HECTOR MD

## 2017-10-03 NOTE — PDOC4
PROCEDURE


Procedure


PROCEDURE NOTE





PROCEDURE:  INSERTION OF SWAN-EVANS CATHETER





PROCEDURE





AFTER OBTAINING INFORMED CONSENT PT TAKEN TO CATH LAB.





L SHOULDER AREA WAS PREPPED AND DRAPED.





AREA INFILTRATED WITH LIDO.





THE SHEATH THAT WAS PRESENT IN THE L SUBCLAVIAN WAS EXCHANGED FOR A 9 Fr SHEATH.





THE SWAN-EVANS CATHETER WAS INSERTED AND ADVANCED ALL THE WAY TO THE PA AND INTO 

THE WEDGE 





POSITION.





THE CATHETER AND THE SHEATH WERE SECURED IN PLACE.





PT TOLERATED THE PROCEDURE WELL.





THE PT WAS RETURNED TO THE ICU IN SATISFACTORY CONDITION.











DAMIAN ONEAL MD Oct 3, 2017 21:09

## 2017-10-04 NOTE — PDOC4
Operative Note


Operative Note


Date


Oct 4, 2017





Preoperative diagnosis


Coronary artery disease


Unstable angina


End stage renal failure on dialysis


Diabetes type 1


Hypertension


Left below knee amputation


Peripheral vascular disease





Postoperative diagnosis


Coronary artery disease


Unstable angina


End stage renal failure on dialysis


Diabetes type 1


Hypertension


Left below knee amputation


Peripheral vascular disease





Procedure Performed


CABG x 3 (LIMA to LAD, SVG to RPDA, SVG to OM)


Left endoscopic greater saphenous vein harvest





Surgeon


Robert Rain MD





Assistant


[facility]





Anesthesiologist


Hossein Stringer MD





Anesthesia 


General





Blood loss


Cellsaver





IV fluids


Crystalloid: 1000 mls


Cellsaver: 450 mls





Urine output


320 mls





Specimens obtained


None





Findings


1.5mm LAD, RPDA and OM targets


Excellent saphenous vein conduit


Excellent LIMA size and flow


Normal LV function





Complications


None





Additional remarks


CPB time: 110 min


x clamp time: 93 min





Indication


53-year-old male admitted with chest pain. No initial EKGs changes and troponin 

was negative. Left heart catheterization demonstrated a relatively small left 

system and a dominant right with a proximal plaque which appeared ruptured. The 

patient has end-stage renal failure on dialysis and a left BKA. He is also 

known to have ischemic cardiomyopathy and is on Plavix. A IABP was placed and 

then removed on the second day of hospitalization. A CABG was indicated. The 

risks, benefits and limitation of the procedure were explained to the patient 

who agreed to proceed. Informed consent was obtained.





Operation


After appropriate identification, the patient was brought to the operating room 

and placed supine on the operating table.  Anesthesia was induced and the 

airway was secured with an endotracheal tube. A right subclavian  [place]Haroldo 

catheter and a left radial arterial line were placed the day before surgery 

under fluoroscopy owing to the presence of a right IJ tunnelled dialysis 

catheter, which would have been very challenging to blindly float the swan. 

Antibiotics were delivered and the patient was preped in the usual standard 

surgical sterile fashion. A timeout was then performed.  A median sternotomy 

was performed and the internal mammary artery was harvested, which was of good 

size with excellent flow.  Simultaneously the left greater saphenous vein was 

harvested endoscopically, which was of excellent quality and caliber.  The 

pericardium was incised. The patient was heparinized.  Cardiopulmonary bypass 

was established through the ascending aorta and the right atrium.  The patient 

was cooled to 34. Myocardial protection was achieved with antegrade blood 

cardioplegia.  The cross-clamp was applied and diastolic arrest was achieved.  

Intermittent dosages of cardioplegia were given.





Grafts:


Saphenous vein graft to right posterior descending coronary artery, end to side 

anastomosis with 7-0 Prolene. 1.5 mm small vessel.


Saphenous vein graft to obtuse marginal coronary artery,  end to side 

anastomosis with 7-0 Prolene. 1.5 mm vessel. 


Left internal mammary artery to distal left anterior descending, end to side 

anastomosis with 7-0 Prolene. 1.5 mm vessel





Two proximal anastomosis were performed using a 6-0 Prolene running suture. The 

cross-clamp was removed. The heart was allowed to rewarm and reperfuse. The 

grafts were de-aired. The patient resumed normal sinus rhythm and was  

from cardiopulmonary bypass without pharmacologic support.  Heparin was 

reversed with protamine. Atrial and ventricular pacing wires were placed.  

Hemostasis was confirmed. An angled 32 [place] chest tube was placed in the left 

pleural space, a 32Fr angled in the posterior pericardium and a 32 straight in 

the anterior pericardium.  The sternotomy was closed with six double steel 

wires.  The incision was closed with a layer of 0 Vicryl followed by 2-0 Vicryl 

and then 4-0 Monocryl for the epidermis. Sterile dressings were applied. The 

total cardiopulmonary bypass time was 110 minutes and the cross-clamp time was 

93 minutes. The instrument, sponge and needle counts were correct.  The patient 

was then transferred to the ICU in critical.











ROBERT RAIN MD Oct 4, 2017 13:23

## 2017-10-04 NOTE — PDOC
BRIEF OPERATIVE NOTE


Date:  Oct 4, 2017


Pre-Op Diagnosis


Coronary artery disease


Unstable angina


End stage renal failure on dialysis


Diabetes type 1


Hypertension


Left below knee amputation


Peripheral vascular disease


Post-Op Diagnosis


Coronary artery disease


Unstable angina


End stage renal failure on dialysis


Diabetes type 1


Hypertension


Left below knee amputation


Peripheral vascular disease


Procedure Performed


CABG x 3 (LIMA to LAD, SVG to RPDA, SVG to OM)


Left endoscopic greater saphenous vein harvest


Surgeon


Robert Rain MD


Assistant


[facility]


Anesthesiologist


Hossein Stringer MD


Anesthesia Type:  General


Blood Loss


Cellsaver


IV Fluid


Crystalloid: 1000 mls


Cellsaver: 450 mls


Urine Output


320 mls


Specimens Obtained


None


Findings


1.5mm LAD, RPDA and OM targets


Excellent saphenous vein conduit


Excellent LIMA size and flow


Complications


None


OPerative Note


Additional remarks





CPB time: 110 min


x clamp time: 93 min











ROBERT RAIN MD Oct 4, 2017 13:15

## 2017-10-04 NOTE — PDOC
PROGRESS NOTES


Subjective


Subjective


Patient intubated in the ICU following the open heart surgery





Objective


Objective





Vital Signs








  Date Time  Temp Pulse Resp B/P (MAP) Pulse Ox O2 Delivery O2 Flow Rate FiO2


 


10/4/17 18:49   22  99 Nasal Cannula 4.0 


 


10/4/17 18:08 99.9 88  142/70 (94)    





 99.9       














Intake and Output 


 


 10/5/17





 07:00


 


Intake Total 812 ml


 


Output Total 263 ml


 


Balance 549 ml


 


 


 


IV Total 812 ml


 


Output Urine Total 183 ml


 


Chest Tube Drainage Total 80 ml











Physical Exam


Physical Exam


Patient intubated





Moving air well





Heart with positive problems





Extremity wrapped in a bandage





Assessment


Assessment


The patient tolerated the CABG rather well.





Presently being weaned off the ventilator.





I agree with present plan.





Problems


Medical Problems:


(1) Chest pain


Status: Acute  








Comment


Review of Relevant


I have reviewed the following items juan (where applicable) has been applied.


Labs





Laboratory Tests








Test


  10/3/17


05:50 10/3/17


07:50 10/3/17


07:51 10/3/17


12:23


 


White Blood Count


  9.4 x10^3/uL


(4.0-11.0) 


  


  


 


 


Red Blood Count


  3.64 x10^6/uL


(4.30-5.70) 


  


  


 


 


Hemoglobin


  10.4 g/dL


(13.0-17.5) 


  


  


 


 


Hematocrit


  30.1 %


(39.0-53.0) 


  


  


 


 


Mean Corpuscular Volume 83 fL ()    


 


Mean Corpuscular Hemoglobin 29 pg (25-35)    


 


Mean Corpuscular Hemoglobin


Concent 35 g/dL


(31-37) 


  


  


 


 


Red Cell Distribution Width


  13.8 %


(11.5-14.5) 


  


  


 


 


Platelet Count


  269 x10^3/uL


(140-400) 


  


  


 


 


Sodium Level


  


  132 mmol/L


(136-145) 


  


 


 


Potassium Level


  


  5.1 mmol/L


(3.5-5.1) 


  


 


 


Chloride Level


  


  96 mmol/L


() 


  


 


 


Carbon Dioxide Level


  


  24 mmol/L


(21-32) 


  


 


 


Anion Gap  12 (6-14)   


 


Blood Urea Nitrogen


  


  43 mg/dL


(8-26) 


  


 


 


Creatinine


  


  4.7 mg/dL


(0.7-1.3) 


  


 


 


Estimated GFR


(Cockcroft-Gault) 


  13.1 


  


  


 


 


Glucose Level


  


  263 mg/dL


(70-99) 


  


 


 


Calcium Level


  


  8.4 mg/dL


(8.5-10.1) 


  


 


 


Glucose (Fingerstick)


  


  


  254 mg/dL


(70-99) 228 mg/dL


(70-99)


 


Test


  10/3/17


17:35 10/3/17


22:18 10/3/17


23:48 10/4/17


00:52


 


Glucose (Fingerstick)


  146 mg/dL


(70-99) 216 mg/dL


(70-99) 155 mg/dL


(70-99) 202 mg/dL


(70-99)


 


Test


  10/4/17


02:01 10/4/17


02:53 10/4/17


03:59 10/4/17


04:50


 


Glucose (Fingerstick)


  183 mg/dL


(70-99) 157 mg/dL


(70-99) 173 mg/dL


(70-99) 


 


 


White Blood Count


  


  


  


  8.8 x10^3/uL


(4.0-11.0)


 


Red Blood Count


  


  


  


  3.27 x10^6/uL


(4.30-5.70)


 


Hemoglobin


  


  


  


  9.4 g/dL


(13.0-17.5)


 


Hematocrit


  


  


  


  27.5 %


(39.0-53.0)


 


Mean Corpuscular Volume    84 fL () 


 


Mean Corpuscular Hemoglobin    29 pg (25-35) 


 


Mean Corpuscular Hemoglobin


Concent 


  


  


  34 g/dL


(31-37)


 


Red Cell Distribution Width


  


  


  


  14.1 %


(11.5-14.5)


 


Platelet Count


  


  


  


  233 x10^3/uL


(140-400)


 


Prothrombin Time


  


  


  


  14.1 SEC


(11.7-14.0)


 


Prothromb Time International


Ratio 


  


  


  1.2 (0.8-1.1) 


 


 


Activated Partial


Thromboplast Time 


  


  


  37 SEC (24-38) 


 


 


Sodium Level


  


  


  


  138 mmol/L


(136-145)


 


Potassium Level


  


  


  


  4.2 mmol/L


(3.5-5.1)


 


Chloride Level


  


  


  


  101 mmol/L


()


 


Carbon Dioxide Level


  


  


  


  25 mmol/L


(21-32)


 


Anion Gap    12 (6-14) 


 


Blood Urea Nitrogen


  


  


  


  56 mg/dL


(8-26)


 


Creatinine


  


  


  


  4.9 mg/dL


(0.7-1.3)


 


Estimated GFR


(Cockcroft-Gault) 


  


  


  12.5 


 


 


Glucose Level


  


  


  


  137 mg/dL


(70-99)


 


Calcium Level


  


  


  


  8.6 mg/dL


(8.5-10.1)


 


Test


  10/4/17


05:12 10/4/17


06:18 10/4/17


08:30 10/4/17


08:32


 


Glucose (Fingerstick)


  132 mg/dL


(70-99) 79 mg/dL


(70-99) 


  


 


 


Bedside Hemoglobin


(Calculated) 


  


  8.8 g/dL


(14-18) 


 


 


Bedside Hematocrit   26 % (37-52)  


 


Bedside Arterial pH


  


  


  7.41


(7.35-7.45) 


 


 


Bedside Arterial pCO2


  


  


  36 mmHg


(35-45) 


 


 


Bedside Arterial pO2


  


  


  177 mmHg


() 


 


 


Bedside Arterial HCO3


  


  


  23 mmol/L


(21-28) 


 


 


Bedside Arterial Total CO2


  


  


  24 mmol/L


(21-32) 


 


 


Arterial Bld O2 Saturation


(Measur) 


  


  100 % (95-99) 


  


 


 


Bedside Arterial Blood Base


Excess 


  


  -2 mmol/L


(0-3) 


 


 


Bedside FiO2   100.0  


 


Bedside Sodium


  


  


  139 mmol/L


(135-145) 


 


 


Bedside Potassium


  


  


  4.5 mmol/L


(3.5-5.0) 


 


 


Glucose Level


  


  


  108 mg/dL


(70-99) 


 


 


Bedside Ionized Calcium (Dave)


  


  


  1.11 mmol/L


(1.13-1.32) 


 


 


Activated Clotting Time


  


  


  


  96 SEC


()


 


Test


  10/4/17


09:44 10/4/17


09:48 10/4/17


10:37 10/4/17


10:40


 


Bedside Hemoglobin


(Calculated) 8.5 g/dL


(14-18) 


  7.5 g/dL


(14-18) 


 


 


Bedside Hematocrit 25 % (37-52)   22 % (37-52)  


 


Bedside Arterial pH


  7.40


(7.35-7.45) 


  7.37


(7.35-7.45) 


 


 


Bedside Arterial pCO2


  37 mmHg


(35-45) 


  38 mmHg


(35-45) 


 


 


Bedside Arterial pO2


  331 mmHg


() 


  331 mmHg


() 


 


 


Bedside Arterial HCO3


  23 mmol/L


(21-28) 


  22 mmol/L


(21-28) 


 


 


Bedside Arterial Total CO2


  24 mmol/L


(21-32) 


  23 mmol/L


(21-32) 


 


 


Arterial Bld O2 Saturation


(Measur) 100 % (95-99) 


  


  100 % (95-99) 


  


 


 


Bedside Arterial Blood Base


Excess -2 mmol/L


(0-3) 


  -3 mmol/L


(0-3) 


 


 


Bedside FiO2 100.0   75.0  


 


Bedside Sodium


  136 mmol/L


(135-145) 


  136 mmol/L


(135-145) 


 


 


Bedside Potassium


  4.6 mmol/L


(3.5-5.0) 


  6.1 mmol/L


(3.5-5.0) 


 


 


Glucose Level


  117 mg/dL


(70-99) 


  115 mg/dL


(70-99) 


 


 


Bedside Ionized Calcium (Dave)


  1.08 mmol/L


(1.13-1.32) 


  0.98 mmol/L


(1.13-1.32) 


 


 


Activated Clotting Time


  


  556 SEC


() 


  466 SEC


()


 


Test


  10/4/17


11:11 10/4/17


11:15 10/4/17


11:41 10/4/17


11:44


 


Bedside Hemoglobin


(Calculated) 7.5 g/dL


(14-18) 


  7.5 g/dL


(14-18) 


 


 


Bedside Hematocrit 22 % (37-52)   22 % (37-52)  


 


Bedside Arterial pH


  7.43


(7.35-7.45) 


  7.39


(7.35-7.45) 


 


 


Bedside Arterial pCO2


  36 mmHg


(35-45) 


  42 mmHg


(35-45) 


 


 


Bedside Arterial pO2


  286 mmHg


() 


  254 mmHg


() 


 


 


Bedside Arterial HCO3


  24 mmol/L


(21-28) 


  25 mmol/L


(21-28) 


 


 


Bedside Arterial Total CO2


  25 mmol/L


(21-32) 


  27 mmol/L


(21-32) 


 


 


Arterial Bld O2 Saturation


(Measur) 100 % (95-99) 


  


  100 % (95-99) 


  


 


 


Bedside Arterial Blood Base


Excess -1 mmol/L


(0-3) 


  0 mmol/L (0-3) 


  


 


 


Bedside FiO2 75.0   75.0  


 


Bedside Sodium


  137 mmol/L


(135-145) 


  137 mmol/L


(135-145) 


 


 


Bedside Potassium


  5.3 mmol/L


(3.5-5.0) 


  6.0 mmol/L


(3.5-5.0) 


 


 


Glucose Level


  130 mg/dL


(70-99) 


  163 mg/dL


(70-99) 


 


 


Bedside Ionized Calcium (Dave)


  1.02 mmol/L


(1.13-1.32) 


  1.04 mmol/L


(1.13-1.32) 


 


 


Activated Clotting Time


  


  459 SEC


() 


  494 SEC


()


 


Test


  10/4/17


11:55 10/4/17


12:10 10/4/17


12:14 10/4/17


12:35


 


Bedside Hematocrit 23 % (37-52)  21 % (37-52)   22 % (37-52) 


 


Bedside Venous pH


  7.35


(7.32-7.42) 


  


  


 


 


Bedside Venous pCO2


  41 mmHg


(41-51) 


  


  


 


 


Bedside Venous pO2


  38 mmHg


(20-40) 


  


  


 


 


Bedside Venous HCO3


  23 mmol/L


(24-28) 


  


  


 


 


Bedside Venous Blood Total CO2


  24 mmol/L


(21-32) 


  


  


 


 


Bedside Venous Blood O2


Saturation 69 % 


  


  


  


 


 


Bedside Venous Blood Base


Excess -3 mmol/L


(0-3) 


  


  


 


 


POC Venous Hemoglobin (Calc)


  7.8 g/dL


(14-18) 


  


  


 


 


Bedside FiO2 75  80.0   100.0 


 


Bedside Sodium


  137 mmol/L


(135-145) 135 mmol/L


(135-145) 


  135 mmol/L


(135-145)


 


Bedside Potassium


  5.9 mmol/L


(3.5-5.0) 6.0 mmol/L


(3.5-5.0) 


  5.5 mmol/L


(3.5-5.0)


 


Glucose Level


  169 mg/dL


(70-99) 164 mg/dL


(70-99) 


  221 mg/dL


(70-99)


 


Bedside Ionized Calcium (Dave)


  1.06 mmol/L


(1.13-1.32) 1.02 mmol/L


(1.13-1.32) 


  1.52 mmol/L


(1.13-1.32)


 


Bedside Hemoglobin


(Calculated) 


  7.1 g/dL


(14-18) 


  7.5 g/dL


(14-18)


 


Bedside Arterial pH


  


  7.35


(7.35-7.45) 


  7.37


(7.35-7.45)


 


Bedside Arterial pCO2


  


  44 mmHg


(35-45) 


  42 mmHg


(35-45)


 


Bedside Arterial pO2


  


  305 mmHg


() 


  441 mmHg


()


 


Bedside Arterial HCO3


  


  24 mmol/L


(21-28) 


  24 mmol/L


(21-28)


 


Bedside Arterial Total CO2


  


  25 mmol/L


(21-32) 


  25 mmol/L


(21-32)


 


Arterial Bld O2 Saturation


(Measur) 


  100 % (95-99) 


  


  100 % (95-99) 


 


 


Bedside Arterial Blood Base


Excess 


  -1 mmol/L


(0-3) 


  -1 mmol/L


(0-3)


 


Activated Clotting Time


  


  


  492 SEC


() 


 


 


Test


  10/4/17


12:39 10/4/17


13:05 10/4/17


13:10 10/4/17


14:10


 


Activated Clotting Time


  99 SEC


() 


  


  


 


 


Bedside Hemoglobin


(Calculated) 


  8.5 g/dL


(14-18) 


  


 


 


Bedside Hematocrit  25 % (37-52)   


 


Bedside Arterial pH


  


  7.36


(7.35-7.45) 


  


 


 


Bedside Arterial pCO2


  


  42 mmHg


(35-45) 


  


 


 


Bedside Arterial pO2


  


  488 mmHg


() 


  


 


 


Bedside Arterial HCO3


  


  24 mmol/L


(21-28) 


  


 


 


Bedside Arterial Total CO2


  


  25 mmol/L


(21-32) 


  


 


 


Arterial Bld O2 Saturation


(Measur) 


  100 % (95-99) 


  


  


 


 


Bedside Arterial Blood Base


Excess 


  -2 mmol/L


(0-3) 


  


 


 


Bedside FiO2  100.0   


 


Bedside Sodium


  


  138 mmol/L


(135-145) 


  


 


 


Bedside Potassium


  


  4.8 mmol/L


(3.5-5.0) 


  


 


 


Glucose Level


  


  191 mg/dL


(70-99) 


  159 mg/dL


(70-99)


 


Bedside Ionized Calcium (Dave)


  


  1.36 mmol/L


(1.13-1.32) 


  


 


 


White Blood Count


  


  


  12.5 x10^3/uL


(4.0-11.0) 16.3 x10^3/uL


(4.0-11.0)


 


Hemoglobin


  


  


  8.2 g/dL


(13.0-17.5) 9.2 g/dL


(13.0-17.5)


 


Hematocrit


  


  


  24.4 %


(39.0-53.0) 27.5 %


(39.0-53.0)


 


Platelet Count


  


  


  155 x10^3/uL


(140-400) 204 x10^3/uL


(140-400)


 


Prothrombin Time


  


  


  17.6 SEC


(11.7-14.0) 16.2 SEC


(11.7-14.0)


 


Prothromb Time International


Ratio 


  


  1.5 (0.8-1.1) 


  1.4 (0.8-1.1) 


 


 


Activated Partial


Thromboplast Time 


  


  42 SEC (24-38) 


  42 SEC (24-38) 


 


 


Fibrinogen


  


  


  394 mg/dL


(200-440) 


 


 


Red Blood Count


  


  


  


  3.24 x10^6/uL


(4.30-5.70)


 


Mean Corpuscular Volume    85 fL () 


 


Mean Corpuscular Hemoglobin    29 pg (25-35) 


 


Mean Corpuscular Hemoglobin


Concent 


  


  


  34 g/dL


(31-37)


 


Red Cell Distribution Width


  


  


  


  14.3 %


(11.5-14.5)


 


O2 Saturation    99 % (92-99) 


 


Arterial Blood pH


  


  


  


  7.36


(7.35-7.45)


 


Arterial Blood pCO2 at


Patient Temp 


  


  


  37 mmHg


(35-46)


 


Arterial Blood pO2 at Patient


Temp 


  


  


  228 mmHg


()


 


Arterial Blood HCO3


  


  


  


  20 mmol/L


(21-28)


 


Arterial Blood Base Excess


  


  


  


  -5 mmol/L


(-3-3)


 


Oxyhemoglobin    98.3 % 


 


Methemoglobin


  


  


  


  0.3 %


(0.0-1.9)


 


Carbon Monoxide, Quantitative


  


  


  


  0.1 %


(0.0-1.9)


 


FiO2    100 


 


Sodium Level


  


  


  


  139 mmol/L


(136-145)


 


Potassium Level


  


  


  


  4.8 mmol/L


(3.5-5.1)


 


Chloride Level


  


  


  


  104 mmol/L


()


 


Carbon Dioxide Level


  


  


  


  23 mmol/L


(21-32)


 


Anion Gap    12 (6-14) 


 


Blood Urea Nitrogen


  


  


  


  55 mg/dL


(8-26)


 


Creatinine


  


  


  


  4.8 mg/dL


(0.7-1.3)


 


Estimated GFR


(Cockcroft-Gault) 


  


  


  12.8 


 


 


Calcium Level


  


  


  


  9.6 mg/dL


(8.5-10.1)


 


Magnesium Level


  


  


  


  3.1 mg/dL


(1.8-2.4)


 


Test


  10/4/17


14:12 10/4/17


15:11 10/4/17


16:15 10/4/17


17:06


 


Glucose (Fingerstick)


  161 mg/dL


(70-99) 161 mg/dL


(70-99) 187 mg/dL


(70-99) 151 mg/dL


(70-99)


 


Test


  10/4/17


18:00 10/4/17


18:06 10/4/17


18:25 


 


 


White Blood Count


  11.6 x10^3/uL


(4.0-11.0) 


  


  


 


 


Red Blood Count


  2.82 x10^6/uL


(4.30-5.70) 


  


  


 


 


Hemoglobin


  7.8 g/dL


(13.0-17.5) 


  


  


 


 


Hematocrit


  23.8 %


(39.0-53.0) 


  


  


 


 


Mean Corpuscular Volume 84 fL ()    


 


Mean Corpuscular Hemoglobin 28 pg (25-35)    


 


Mean Corpuscular Hemoglobin


Concent 33 g/dL


(31-37) 


  


  


 


 


Red Cell Distribution Width


  14.1 %


(11.5-14.5) 


  


  


 


 


Platelet Count


  197 x10^3/uL


(140-400) 


  


  


 


 


Potassium Level


  4.7 mmol/L


(3.5-5.1) 


  


  


 


 


Glucose (Fingerstick)


  


  148 mg/dL


(70-99) 


  


 


 


O2 Saturation   98 % (92-99)  


 


Arterial Blood pH


  


  


  7.40


(7.35-7.45) 


 


 


Arterial Blood pCO2 at


Patient Temp 


  


  36 mmHg


(35-46) 


 


 


Arterial Blood pO2 at Patient


Temp 


  


  148 mmHg


() 


 


 


Arterial Blood HCO3


  


  


  21 mmol/L


(21-28) 


 


 


Arterial Blood Base Excess


  


  


  -3 mmol/L


(-3-3) 


 


 


FiO2   40  








Laboratory Tests








Test


  10/3/17


22:18 10/3/17


23:48 10/4/17


00:52 10/4/17


02:01


 


Glucose (Fingerstick)


  216 mg/dL


(70-99) 155 mg/dL


(70-99) 202 mg/dL


(70-99) 183 mg/dL


(70-99)


 


Test


  10/4/17


02:53 10/4/17


03:59 10/4/17


04:50 10/4/17


05:12


 


Glucose (Fingerstick)


  157 mg/dL


(70-99) 173 mg/dL


(70-99) 


  132 mg/dL


(70-99)


 


White Blood Count


  


  


  8.8 x10^3/uL


(4.0-11.0) 


 


 


Red Blood Count


  


  


  3.27 x10^6/uL


(4.30-5.70) 


 


 


Hemoglobin


  


  


  9.4 g/dL


(13.0-17.5) 


 


 


Hematocrit


  


  


  27.5 %


(39.0-53.0) 


 


 


Mean Corpuscular Volume   84 fL ()  


 


Mean Corpuscular Hemoglobin   29 pg (25-35)  


 


Mean Corpuscular Hemoglobin


Concent 


  


  34 g/dL


(31-37) 


 


 


Red Cell Distribution Width


  


  


  14.1 %


(11.5-14.5) 


 


 


Platelet Count


  


  


  233 x10^3/uL


(140-400) 


 


 


Prothrombin Time


  


  


  14.1 SEC


(11.7-14.0) 


 


 


Prothromb Time International


Ratio 


  


  1.2 (0.8-1.1) 


  


 


 


Activated Partial


Thromboplast Time 


  


  37 SEC (24-38) 


  


 


 


Sodium Level


  


  


  138 mmol/L


(136-145) 


 


 


Potassium Level


  


  


  4.2 mmol/L


(3.5-5.1) 


 


 


Chloride Level


  


  


  101 mmol/L


() 


 


 


Carbon Dioxide Level


  


  


  25 mmol/L


(21-32) 


 


 


Anion Gap   12 (6-14)  


 


Blood Urea Nitrogen


  


  


  56 mg/dL


(8-26) 


 


 


Creatinine


  


  


  4.9 mg/dL


(0.7-1.3) 


 


 


Estimated GFR


(Cockcroft-Gault) 


  


  12.5 


  


 


 


Glucose Level


  


  


  137 mg/dL


(70-99) 


 


 


Calcium Level


  


  


  8.6 mg/dL


(8.5-10.1) 


 


 


Test


  10/4/17


06:18 10/4/17


08:30 10/4/17


08:32 10/4/17


09:44


 


Glucose (Fingerstick)


  79 mg/dL


(70-99) 


  


  


 


 


Bedside Hemoglobin


(Calculated) 


  8.8 g/dL


(14-18) 


  8.5 g/dL


(14-18)


 


Bedside Hematocrit  26 % (37-52)   25 % (37-52) 


 


Bedside Arterial pH


  


  7.41


(7.35-7.45) 


  7.40


(7.35-7.45)


 


Bedside Arterial pCO2


  


  36 mmHg


(35-45) 


  37 mmHg


(35-45)


 


Bedside Arterial pO2


  


  177 mmHg


() 


  331 mmHg


()


 


Bedside Arterial HCO3


  


  23 mmol/L


(21-28) 


  23 mmol/L


(21-28)


 


Bedside Arterial Total CO2


  


  24 mmol/L


(21-32) 


  24 mmol/L


(21-32)


 


Arterial Bld O2 Saturation


(Measur) 


  100 % (95-99) 


  


  100 % (95-99) 


 


 


Bedside Arterial Blood Base


Excess 


  -2 mmol/L


(0-3) 


  -2 mmol/L


(0-3)


 


Bedside FiO2  100.0   100.0 


 


Bedside Sodium


  


  139 mmol/L


(135-145) 


  136 mmol/L


(135-145)


 


Bedside Potassium


  


  4.5 mmol/L


(3.5-5.0) 


  4.6 mmol/L


(3.5-5.0)


 


Glucose Level


  


  108 mg/dL


(70-99) 


  117 mg/dL


(70-99)


 


Bedside Ionized Calcium (Dave)


  


  1.11 mmol/L


(1.13-1.32) 


  1.08 mmol/L


(1.13-1.32)


 


Activated Clotting Time


  


  


  96 SEC


() 


 


 


Test


  10/4/17


09:48 10/4/17


10:37 10/4/17


10:40 10/4/17


11:11


 


Activated Clotting Time


  556 SEC


() 


  466 SEC


() 


 


 


Bedside Hemoglobin


(Calculated) 


  7.5 g/dL


(14-18) 


  7.5 g/dL


(14-18)


 


Bedside Hematocrit  22 % (37-52)   22 % (37-52) 


 


Bedside Arterial pH


  


  7.37


(7.35-7.45) 


  7.43


(7.35-7.45)


 


Bedside Arterial pCO2


  


  38 mmHg


(35-45) 


  36 mmHg


(35-45)


 


Bedside Arterial pO2


  


  331 mmHg


() 


  286 mmHg


()


 


Bedside Arterial HCO3


  


  22 mmol/L


(21-28) 


  24 mmol/L


(21-28)


 


Bedside Arterial Total CO2


  


  23 mmol/L


(21-32) 


  25 mmol/L


(21-32)


 


Arterial Bld O2 Saturation


(Measur) 


  100 % (95-99) 


  


  100 % (95-99) 


 


 


Bedside Arterial Blood Base


Excess 


  -3 mmol/L


(0-3) 


  -1 mmol/L


(0-3)


 


Bedside FiO2  75.0   75.0 


 


Bedside Sodium


  


  136 mmol/L


(135-145) 


  137 mmol/L


(135-145)


 


Bedside Potassium


  


  6.1 mmol/L


(3.5-5.0) 


  5.3 mmol/L


(3.5-5.0)


 


Glucose Level


  


  115 mg/dL


(70-99) 


  130 mg/dL


(70-99)


 


Bedside Ionized Calcium (Dave)


  


  0.98 mmol/L


(1.13-1.32) 


  1.02 mmol/L


(1.13-1.32)


 


Test


  10/4/17


11:15 10/4/17


11:41 10/4/17


11:44 10/4/17


11:55


 


Activated Clotting Time


  459 SEC


() 


  494 SEC


() 


 


 


Bedside Hemoglobin


(Calculated) 


  7.5 g/dL


(14-18) 


  


 


 


Bedside Hematocrit  22 % (37-52)   23 % (37-52) 


 


Bedside Arterial pH


  


  7.39


(7.35-7.45) 


  


 


 


Bedside Arterial pCO2


  


  42 mmHg


(35-45) 


  


 


 


Bedside Arterial pO2


  


  254 mmHg


() 


  


 


 


Bedside Arterial HCO3


  


  25 mmol/L


(21-28) 


  


 


 


Bedside Arterial Total CO2


  


  27 mmol/L


(21-32) 


  


 


 


Arterial Bld O2 Saturation


(Measur) 


  100 % (95-99) 


  


  


 


 


Bedside Arterial Blood Base


Excess 


  0 mmol/L (0-3) 


  


  


 


 


Bedside FiO2  75.0   75 


 


Bedside Sodium


  


  137 mmol/L


(135-145) 


  137 mmol/L


(135-145)


 


Bedside Potassium


  


  6.0 mmol/L


(3.5-5.0) 


  5.9 mmol/L


(3.5-5.0)


 


Glucose Level


  


  163 mg/dL


(70-99) 


  169 mg/dL


(70-99)


 


Bedside Ionized Calcium (Dave)


  


  1.04 mmol/L


(1.13-1.32) 


  1.06 mmol/L


(1.13-1.32)


 


Bedside Venous pH


  


  


  


  7.35


(7.32-7.42)


 


Bedside Venous pCO2


  


  


  


  41 mmHg


(41-51)


 


Bedside Venous pO2


  


  


  


  38 mmHg


(20-40)


 


Bedside Venous HCO3


  


  


  


  23 mmol/L


(24-28)


 


Bedside Venous Blood Total CO2


  


  


  


  24 mmol/L


(21-32)


 


Bedside Venous Blood O2


Saturation 


  


  


  69 % 


 


 


Bedside Venous Blood Base


Excess 


  


  


  -3 mmol/L


(0-3)


 


POC Venous Hemoglobin (Calc)


  


  


  


  7.8 g/dL


(14-18)


 


Test


  10/4/17


12:10 10/4/17


12:14 10/4/17


12:35 10/4/17


12:39


 


Bedside Hemoglobin


(Calculated) 7.1 g/dL


(14-18) 


  7.5 g/dL


(14-18) 


 


 


Bedside Hematocrit 21 % (37-52)   22 % (37-52)  


 


Bedside Arterial pH


  7.35


(7.35-7.45) 


  7.37


(7.35-7.45) 


 


 


Bedside Arterial pCO2


  44 mmHg


(35-45) 


  42 mmHg


(35-45) 


 


 


Bedside Arterial pO2


  305 mmHg


() 


  441 mmHg


() 


 


 


Bedside Arterial HCO3


  24 mmol/L


(21-28) 


  24 mmol/L


(21-28) 


 


 


Bedside Arterial Total CO2


  25 mmol/L


(21-32) 


  25 mmol/L


(21-32) 


 


 


Arterial Bld O2 Saturation


(Measur) 100 % (95-99) 


  


  100 % (95-99) 


  


 


 


Bedside Arterial Blood Base


Excess -1 mmol/L


(0-3) 


  -1 mmol/L


(0-3) 


 


 


Bedside FiO2 80.0   100.0  


 


Bedside Sodium


  135 mmol/L


(135-145) 


  135 mmol/L


(135-145) 


 


 


Bedside Potassium


  6.0 mmol/L


(3.5-5.0) 


  5.5 mmol/L


(3.5-5.0) 


 


 


Glucose Level


  164 mg/dL


(70-99) 


  221 mg/dL


(70-99) 


 


 


Bedside Ionized Calcium (Dave)


  1.02 mmol/L


(1.13-1.32) 


  1.52 mmol/L


(1.13-1.32) 


 


 


Activated Clotting Time


  


  492 SEC


() 


  99 SEC


()


 


Test


  10/4/17


13:05 10/4/17


13:10 10/4/17


14:10 10/4/17


14:12


 


Bedside Hemoglobin


(Calculated) 8.5 g/dL


(14-18) 


  


  


 


 


Bedside Hematocrit 25 % (37-52)    


 


Bedside Arterial pH


  7.36


(7.35-7.45) 


  


  


 


 


Bedside Arterial pCO2


  42 mmHg


(35-45) 


  


  


 


 


Bedside Arterial pO2


  488 mmHg


() 


  


  


 


 


Bedside Arterial HCO3


  24 mmol/L


(21-28) 


  


  


 


 


Bedside Arterial Total CO2


  25 mmol/L


(21-32) 


  


  


 


 


Arterial Bld O2 Saturation


(Measur) 100 % (95-99) 


  


  


  


 


 


Bedside Arterial Blood Base


Excess -2 mmol/L


(0-3) 


  


  


 


 


Bedside FiO2 100.0    


 


Bedside Sodium


  138 mmol/L


(135-145) 


  


  


 


 


Bedside Potassium


  4.8 mmol/L


(3.5-5.0) 


  


  


 


 


Glucose Level


  191 mg/dL


(70-99) 


  159 mg/dL


(70-99) 


 


 


Bedside Ionized Calcium (Dave)


  1.36 mmol/L


(1.13-1.32) 


  


  


 


 


White Blood Count


  


  12.5 x10^3/uL


(4.0-11.0) 16.3 x10^3/uL


(4.0-11.0) 


 


 


Hemoglobin


  


  8.2 g/dL


(13.0-17.5) 9.2 g/dL


(13.0-17.5) 


 


 


Hematocrit


  


  24.4 %


(39.0-53.0) 27.5 %


(39.0-53.0) 


 


 


Platelet Count


  


  155 x10^3/uL


(140-400) 204 x10^3/uL


(140-400) 


 


 


Prothrombin Time


  


  17.6 SEC


(11.7-14.0) 16.2 SEC


(11.7-14.0) 


 


 


Prothromb Time International


Ratio 


  1.5 (0.8-1.1) 


  1.4 (0.8-1.1) 


  


 


 


Activated Partial


Thromboplast Time 


  42 SEC (24-38) 


  42 SEC (24-38) 


  


 


 


Fibrinogen


  


  394 mg/dL


(200-440) 


  


 


 


Red Blood Count


  


  


  3.24 x10^6/uL


(4.30-5.70) 


 


 


Mean Corpuscular Volume   85 fL ()  


 


Mean Corpuscular Hemoglobin   29 pg (25-35)  


 


Mean Corpuscular Hemoglobin


Concent 


  


  34 g/dL


(31-37) 


 


 


Red Cell Distribution Width


  


  


  14.3 %


(11.5-14.5) 


 


 


O2 Saturation   99 % (92-99)  


 


Arterial Blood pH


  


  


  7.36


(7.35-7.45) 


 


 


Arterial Blood pCO2 at


Patient Temp 


  


  37 mmHg


(35-46) 


 


 


Arterial Blood pO2 at Patient


Temp 


  


  228 mmHg


() 


 


 


Arterial Blood HCO3


  


  


  20 mmol/L


(21-28) 


 


 


Arterial Blood Base Excess


  


  


  -5 mmol/L


(-3-3) 


 


 


Oxyhemoglobin   98.3 %  


 


Methemoglobin


  


  


  0.3 %


(0.0-1.9) 


 


 


Carbon Monoxide, Quantitative


  


  


  0.1 %


(0.0-1.9) 


 


 


FiO2   100  


 


Sodium Level


  


  


  139 mmol/L


(136-145) 


 


 


Potassium Level


  


  


  4.8 mmol/L


(3.5-5.1) 


 


 


Chloride Level


  


  


  104 mmol/L


() 


 


 


Carbon Dioxide Level


  


  


  23 mmol/L


(21-32) 


 


 


Anion Gap   12 (6-14)  


 


Blood Urea Nitrogen


  


  


  55 mg/dL


(8-26) 


 


 


Creatinine


  


  


  4.8 mg/dL


(0.7-1.3) 


 


 


Estimated GFR


(Cockcroft-Gault) 


  


  12.8 


  


 


 


Calcium Level


  


  


  9.6 mg/dL


(8.5-10.1) 


 


 


Magnesium Level


  


  


  3.1 mg/dL


(1.8-2.4) 


 


 


Glucose (Fingerstick)


  


  


  


  161 mg/dL


(70-99)


 


Test


  10/4/17


15:11 10/4/17


16:15 10/4/17


17:06 10/4/17


18:00


 


Glucose (Fingerstick)


  161 mg/dL


(70-99) 187 mg/dL


(70-99) 151 mg/dL


(70-99) 


 


 


White Blood Count


  


  


  


  11.6 x10^3/uL


(4.0-11.0)


 


Red Blood Count


  


  


  


  2.82 x10^6/uL


(4.30-5.70)


 


Hemoglobin


  


  


  


  7.8 g/dL


(13.0-17.5)


 


Hematocrit


  


  


  


  23.8 %


(39.0-53.0)


 


Mean Corpuscular Volume    84 fL () 


 


Mean Corpuscular Hemoglobin    28 pg (25-35) 


 


Mean Corpuscular Hemoglobin


Concent 


  


  


  33 g/dL


(31-37)


 


Red Cell Distribution Width


  


  


  


  14.1 %


(11.5-14.5)


 


Platelet Count


  


  


  


  197 x10^3/uL


(140-400)


 


Potassium Level


  


  


  


  4.7 mmol/L


(3.5-5.1)


 


Test


  10/4/17


18:06 10/4/17


18:25 


  


 


 


Glucose (Fingerstick)


  148 mg/dL


(70-99) 


  


  


 


 


O2 Saturation  98 % (92-99)   


 


Arterial Blood pH


  


  7.40


(7.35-7.45) 


  


 


 


Arterial Blood pCO2 at


Patient Temp 


  36 mmHg


(35-46) 


  


 


 


Arterial Blood pO2 at Patient


Temp 


  148 mmHg


() 


  


 


 


Arterial Blood HCO3


  


  21 mmol/L


(21-28) 


  


 


 


Arterial Blood Base Excess


  


  -3 mmol/L


(-3-3) 


  


 


 


FiO2  40   








Medications





Current Medications


Iodixanol (Visipaque 320) 100 ml STK-MED ONCE .ROUTE ;  Start 9/27/17 at 11:27;

  Stop 9/27/17 at 11:28;  Status DC


Lidocaine HCl 20 ml STK-MED ONCE .ROUTE ;  Start 9/27/17 at 11:27;  Stop 9/27/ 17 at 11:28;  Status DC


Heparin Sodium/ Sodium Chloride 1,000 ml @  As Directed STK-MED ONCE .ROUTE ;  

Start 9/27/17 at 11:28;  Stop 9/27/17 at 11:29;  Status DC


Ondansetron HCl (Zofran) 4 mg PRN Q8HRS  PRN IV NAUSEA/VOMITING;  Start 9/27/17 

at 12:30;  Stop 9/28/17 at 12:29;  Status DC


Fentanyl Citrate (Fentanyl 2ml Vial) 100 mcg STK-MED ONCE .ROUTE ;  Start 9/27/ 17 at 12:33;  Stop 9/27/17 at 12:34;  Status DC


Midazolam HCl (Versed) 5 mg STK-MED ONCE .ROUTE ;  Start 9/27/17 at 12:33;  

Stop 9/27/17 at 12:34;  Status DC


Heparin Sodium (Porcine) (Heparin Sodium) 10,000 unit STK-MED ONCE .ROUTE ;  

Start 9/27/17 at 12:59;  Stop 9/27/17 at 13:00;  Status DC


Heparin Sodium/ Dextrose 500 ml @  As Directed STK-MED ONCE IV ;  Start 9/27/17 

at 13:21;  Stop 9/27/17 at 13:22;  Status DC


Heparin Sodium/ Sodium Chloride 1,000 unit 1X  ONCE IART  Last administered on 9 /27/17at 13:40;  Start 9/27/17 at 13:00;  Stop 9/27/17 at 13:47;  Status DC


Midazolam HCl (Versed) 5 mg 1X  ONCE IV  Last administered on 9/27/17at 13:41;  

Start 9/27/17 at 13:00;  Stop 9/27/17 at 13:47;  Status DC


Fentanyl Citrate (Fentanyl 2ml Vial) 100 mcg 1X  ONCE IV  Last administered on 9 /27/17at 13:42;  Start 9/27/17 at 13:00;  Stop 9/27/17 at 13:47;  Status DC


Iodixanol (Visipaque 320) 100 ml 1X  ONCE IART  Last administered on 9/27/17at 

13:40;  Start 9/27/17 at 13:00;  Stop 9/27/17 at 13:47;  Status DC


Heparin Sodium (Porcine) (Heparin Sodium) 8,000 unit 1X  ONCE IV  Last 

administered on 9/27/17at 13:43;  Start 9/27/17 at 13:00;  Stop 9/27/17 at 13:47

;  Status DC


Lidocaine HCl 20 ml 1X  ONCE IJ  Last administered on 9/27/17at 13:40;  Start 9/ 27/17 at 13:00;  Stop 9/27/17 at 13:47;  Status DC


Heparin Sodium (Porcine) (Heparin 5,000 Units/1,000ml NS) 5,000 unit 1X  ONCE 

IV  Last administered on 9/27/17at 13:00;  Start 9/27/17 at 13:00;  Stop 9/27/ 17 at 13:47;  Status DC


Heparin Sodium/ Dextrose 500 ml @  20 mls/hr CONT PRN  PRN IV SEE COMMENTS Last 

administered on 9/27/17at 13:45;  Start 9/27/17 at 13:00;  Stop 9/28/17 at 09:04

;  Status DC


Oxycodone/ Acetaminophen (Percocet 10/325) 1 tab PRN Q6HRS  PRN PO PAIN Last 

administered on 9/30/17at 01:11;  Start 9/27/17 at 16:45;  Stop 9/30/17 at 12:30

;  Status DC


Cyclobenzaprine HCl (Flexeril) 10 mg PRN Q6HRS  PRN PO MUSCLE SPASMS Last 

administered on 10/4/17at 01:51;  Start 9/27/17 at 16:45


Diazepam (Valium) 5 mg PRN Q6HRS  PRN PO ANXIETY Last administered on 10/1/17at 

08:55;  Start 9/27/17 at 16:45;  Stop 10/1/17 at 12:32;  Status DC


Polyethylene Glycol (miraLAX PACKET) 17 gm DAILY PO ;  Start 9/28/17 at 09:00;  

Stop 9/28/17 at 09:00;  Status DC


Polyethylene Glycol (miraLAX PACKET) 17 gm DAILY PO  Last administered on 10/3/

17at 08:46;  Start 9/27/17 at 21:00


Fentanyl Citrate (Fentanyl 2ml Vial) 50 mcg PRN Q3HRS  PRN IV SEVERE PAIN Last 

administered on 10/4/17at 06:03;  Start 9/28/17 at 01:45


Zolpidem Tartrate (Ambien) 5 mg PRN QHS  PRN PO INSOMNIA, MAY REPEAT IN 1HR 

Last administered on 9/28/17at 21:06;  Start 9/28/17 at 09:30


Cefazolin Sodium/ Dextrose 50 ml @  100 mls/hr 1X  ONCE IV  Last administered 

on 10/3/17at 08:32;  Start 10/3/17 at 06:00;  Stop 10/3/17 at 06:29;  Status DC


Amlodipine Besylate (Norvasc) 10 mg DAILY PO  Last administered on 10/3/17at 08:

48;  Start 9/28/17 at 11:00;  Stop 10/4/17 at 14:06;  Status DC


Aspirin (Myles Aspirin) 325 mg DAILY PO ;  Start 9/28/17 at 11:00;  Stop 9/28/ 17 at 11:00;  Status DC


Clopidogrel Bisulfate (Plavix) 75 mg DAILY PO ;  Start 9/28/17 at 11:00;  Stop 9 /28/17 at 11:00;  Status DC


Isosorbide Mononitrate (Imdur) 30 mg DAILY PO  Last administered on 10/3/17at 08

:48;  Start 9/28/17 at 11:00;  Stop 10/4/17 at 14:06;  Status DC


Losartan Potassium (Cozaar) 100 mg DAILY08 PO ;  Start 9/28/17 at 11:00;  Stop 9 /28/17 at 11:00;  Status DC


Oxycodone/ Acetaminophen (Percocet 10/325) 1 tab QID PO  Last administered on 10

/3/17at 22:15;  Start 9/28/17 at 13:00;  Stop 10/4/17 at 18:25;  Status DC


Ranolazine (Ranexa) 500 mg BID PO  Last administered on 10/3/17at 22:14;  Start 

9/28/17 at 11:00;  Stop 10/4/17 at 14:06;  Status DC


Sodium Bicarbonate (Sodium Bicarbonate) 650 mg TID PO  Last administered on 10/3

/17at 22:13;  Start 9/28/17 at 14:00;  Stop 10/4/17 at 18:28;  Status DC


Oxycodone/ Acetaminophen (Percocet 10/325) 1 tab PRN Q6HRS  PRN PO PAIN Last 

administered on 10/4/17at 00:47;  Start 9/28/17 at 10:30;  Stop 10/4/17 at 18:25

;  Status DC


Darbepoetin Benny (Aranesp) 60 mcg Th SQ  Last administered on 9/28/17at 21:02;  

Start 9/28/17 at 21:00


Insulin Aspart (NovoLOG) 10 units 1X  ONCE SQ  Last administered on 9/28/17at 21

:40;  Start 9/28/17 at 22:00;  Stop 9/28/17 at 22:01;  Status DC


Insulin Detemir (Levemir) 40 units BID SQ  Last administered on 9/30/17at 09:20

;  Start 9/28/17 at 22:00;  Stop 9/30/17 at 12:31;  Status DC


Sodium Chloride 1,000 ml @  1,000 mls/hr Q1H PRN IV hypotension;  Start 9/29/17 

at 06:52;  Stop 9/29/17 at 12:51;  Status DC


Sodium Chloride (Normal Saline Flush) 10 ml 1X PRN  PRN IV AP catheter pack;  

Start 9/29/17 at 07:00;  Stop 9/30/17 at 06:59;  Status DC


Sodium Chloride (Normal Saline Flush) 10 ml 1X PRN  PRN IV  catheter pack;  

Start 9/29/17 at 07:00;  Stop 9/30/17 at 06:59;  Status DC


Info (PHARMACY MONITORING -- do not chart) 1 each PRN DAILY  PRN MC SEE COMMENTS

;  Start 9/29/17 at 07:00


Insulin Aspart (NovoLOG) 10 units TIDAC  PRN SQ BLOOD SUGAR > 200 Last 

administered on 10/1/17at 08:51;  Start 9/29/17 at 18:15;  Stop 10/1/17 at 10:29

;  Status DC


Insulin Aspart (NovoLOG) 20 units TIDAC  PRN SQ BLOOD SUGAR > 300 Last 

administered on 9/29/17at 18:34;  Start 9/29/17 at 18:15;  Stop 10/1/17 at 10:29

;  Status DC


Insulin Detemir (Levemir) 45 units BID SQ  Last administered on 10/1/17at 08:50

;  Start 9/30/17 at 21:00;  Stop 10/1/17 at 10:29;  Status DC


Diazepam (Valium) 2 mg PRN Q8HRS  PRN PO ANXIETY;  Start 9/30/17 at 12:30;  

Stop 10/1/17 at 12:32;  Status DC


Insulin Detemir (Levemir) 55 units BID SQ  Last administered on 10/3/17at 22:21

;  Start 10/1/17 at 21:00;  Stop 10/4/17 at 07:54;  Status DC


Insulin Aspart (NovoLOG) 20 units TIDAC SQ  Last administered on 10/1/17at 18:35

;  Start 10/1/17 at 11:30;  Stop 10/2/17 at 08:37;  Status DC


Insulin Aspart (NovoLOG) 0-9 UNITS TIDWMEALS SQ  Last administered on 10/3/17at 

12:37;  Start 10/1/17 at 12:00;  Stop 10/4/17 at 07:54;  Status DC


Dextrose (Dextrose 50%-Water Syringe) 12.5 gm PRN Q15MIN  PRN IV SEE COMMENTS;  

Start 10/1/17 at 10:30


Diazepam (Valium) 5 mg 1X  ONCE PO  Last administered on 10/1/17at 11:52;  

Start 10/1/17 at 11:00;  Stop 10/1/17 at 11:01;  Status DC


Diazepam (Valium) 10 mg PRN Q6HRS  PRN PO ANXIETY Last administered on 10/3/

17at 15:31;  Start 10/1/17 at 12:30;  Stop 10/4/17 at 14:07;  Status DC


Sodium Chloride 1,000 ml @  1,000 mls/hr Q1H PRN IV hypotension;  Start 10/2/17 

at 07:51;  Stop 10/2/17 at 13:50;  Status DC


Info (PHARMACY MONITORING -- do not chart) 1 each PRN DAILY  PRN MC SEE COMMENTS

;  Start 10/2/17 at 08:00;  Status UNV


Insulin Aspart (NovoLOG) 15 units TIDAC SQ  Last administered on 10/3/17at 17:39

;  Start 10/2/17 at 11:30;  Stop 10/4/17 at 07:54;  Status DC


Ondansetron HCl (Zofran) 4 mg PRN Q6HRS  PRN IV NAUSEA/VOMITING;  Start 10/3/17 

at 07:00;  Stop 10/3/17 at 09:10;  Status DC


Fentanyl Citrate (Fentanyl 2ml Vial) 25 mcg PRN Q5MIN  PRN IV MILD PAIN;  Start 

10/3/17 at 07:00;  Stop 10/4/17 at 06:59;  Status Cancel


Fentanyl Citrate (Fentanyl 2ml Vial) 50 mcg PRN Q5MIN  PRN IV MODERATE PAIN;  

Start 10/3/17 at 07:00;  Stop 10/4/17 at 06:59;  Status Cancel


Morphine Sulfate 1 mg PRN Q10MIN  PRN IV SEVERE PAIN;  Start 10/3/17 at 07:00;  

Stop 10/4/17 at 06:59;  Status Cancel


Ringer's Solution 1,000 ml @  30 mls/hr Q24H IV ;  Start 10/3/17 at 07:00;  

Stop 10/3/17 at 18:59;  Status Cancel


Lidocaine HCl (Xylocaine-Mpf 1% Vial) 2 ml PRN 1X  PRN ID IV START;  Start 10/3/

17 at 07:00;  Stop 10/4/17 at 06:59;  Status Cancel


Hydromorphone HCl (Dilaudid) 0.5 mg PRN Q10MIN  PRN IV SEV PAIN, Second choice;

  Start 10/3/17 at 07:00;  Stop 10/4/17 at 06:59;  Status Cancel


Prochlorperazine Edisylate (Compazine) 5 mg PACU PRN  PRN IV NAUSEA, MRX1;  

Start 10/3/17 at 07:00;  Stop 10/4/17 at 06:59;  Status Cancel


Cefazolin Sodium 1 gm/Sodium Chloride 500 ml @  500 mls/hr 1X PERIOP  ONCE IRR  

Last administered on 10/3/17at 06:00;  Start 10/3/17 at 06:00;  Stop 10/3/17 at 

06:59;  Status DC


Heparin Sodium (Porcine) 95996 unit/Ringer's Solution 1,020 ml @  1,020 mls/hr 

1X PERIOP  ONCE IRR  Last administered on 10/3/17at 06:00;  Start 10/3/17 at 06:

00;  Stop 10/3/17 at 06:59;  Status DC


Potassium Chloride 70 meq/ Sodium Bicarbonate 12.5 meq/Lidocaine HCl 24 ml/

Parenteral Electrolytes 571.5 ml @  571.5 mls/ hr 1X PERIOP  ONCE IRR  Last 

administered on 10/3/17at 06:00;  Start 10/3/17 at 06:00;  Stop 10/3/17 at 06:59

;  Status DC


Potassium Chloride 15 meq/ Sodium Bicarbonate 12.5 meq/Parenteral Electrolytes 

520 ml @  520 mls/hr 1X PERIOP  ONCE IRR  Last administered on 10/3/17at 06:00;

  Start 10/3/17 at 06:00;  Stop 10/3/17 at 06:59;  Status DC


Midazolam HCl (Versed) 2 mg STK-MED ONCE .ROUTE ;  Start 10/2/17 at 13:27;  

Stop 10/2/17 at 13:28;  Status DC


Lidocaine HCl 20 ml STK-MED ONCE .ROUTE ;  Start 10/2/17 at 13:30;  Stop 10/2/

17 at 13:31;  Status DC


Heparin Sodium/ Sodium Chloride 500 ml @  As Directed STK-MED ONCE .ROUTE ;  

Start 10/2/17 at 13:30;  Stop 10/2/17 at 13:31;  Status DC


Heparin Sodium/ Sodium Chloride 1,000 unit 1X  ONCE IART  Last administered on 

10/2/17at 15:17;  Start 10/2/17 at 14:00;  Stop 10/2/17 at 14:01;  Status DC


Midazolam HCl (Versed) 2 mg 1X  ONCE IV  Last administered on 10/2/17at 15:23;  

Start 10/2/17 at 14:00;  Stop 10/2/17 at 14:01;  Status DC


Fentanyl Citrate (Fentanyl 2ml Vial) 100 mcg 1X  ONCE IV  Last administered on 

10/2/17at 15:24;  Start 10/2/17 at 14:00;  Stop 10/2/17 at 14:01;  Status DC


Lidocaine HCl 20 ml 1X  ONCE IJ  Last administered on 10/2/17at 15:16;  Start 10

/2/17 at 14:00;  Stop 10/2/17 at 14:01;  Status DC


Iohexol (Omnipaque 300 Mg/ml) 100 ml STK-MED ONCE .ROUTE ;  Start 10/2/17 at 14:

25;  Stop 10/2/17 at 14:26;  Status DC


Iohexol (Omnipaque 300 Mg/ml) 10 ml 1X  ONCE IART  Last administered on 10/2/

17at 15:17;  Start 10/2/17 at 14:45;  Stop 10/2/17 at 15:06;  Status DC


Info (Do NOT chart on this entry -- for MONITORING) 1 each PRN DAILY  PRN MC 

SEE COMMENTS;  Start 10/2/17 at 15:15;  Stop 10/4/17 at 15:14;  Status DC


Lidocaine HCl 20 ml STK-MED ONCE .ROUTE ;  Start 10/2/17 at 14:06;  Stop 10/2/

17 at 15:06;  Status DC


Cellulose 1 each STK-MED ONCE .ROUTE ;  Start 10/2/17 at 14:06;  Stop 10/2/17 

at 15:06;  Status DC


Protamine Sulfate 50 mg STK-MED ONCE IV ;  Start 10/2/17 at 14:06;  Stop 10/2/

17 at 15:06;  Status DC


Ondansetron HCl (Zofran) 4 mg PRN Q6HRS  PRN IV NAUSEA/VOMITING;  Start 10/2/17 

at 15:15;  Stop 10/3/17 at 15:14;  Status Cancel


Fentanyl Citrate (Fentanyl 2ml Vial) 25 mcg PRN Q5MIN  PRN IV MILD PAIN;  Start 

10/2/17 at 15:15;  Stop 10/3/17 at 15:14;  Status Cancel


Fentanyl Citrate (Fentanyl 2ml Vial) 50 mcg PRN Q5MIN  PRN IV MODERATE PAIN;  

Start 10/2/17 at 15:15;  Stop 10/3/17 at 15:14;  Status Cancel


Morphine Sulfate 1 mg PRN Q10MIN  PRN IV SEVERE PAIN;  Start 10/2/17 at 15:15;  

Stop 10/3/17 at 15:14;  Status Cancel


Ringer's Solution 1,000 ml @  30 mls/hr Q24H IV ;  Start 10/2/17 at 15:08;  

Stop 10/3/17 at 03:07;  Status DC


Lidocaine HCl (Xylocaine-Mpf 1% Vial) 2 ml 1X PRN  PRN ID IV START;  Start 10/2/

17 at 15:15;  Stop 10/3/17 at 15:14;  Status Cancel


Hydromorphone HCl (Dilaudid) 0.5 mg PRN Q10MIN  PRN IV SEV PAIN, Second choice;

  Start 10/2/17 at 15:15;  Stop 10/3/17 at 15:14;  Status Cancel


Prochlorperazine Edisylate (Compazine) 5 mg PACU PRN  PRN IV NAUSEA, MRX1;  

Start 10/2/17 at 15:15;  Stop 10/3/17 at 15:14;  Status Cancel


Heparin Sodium (Porcine) 5000 unit/Ringer's Solution 505 ml @  505 mls/hr 1X 

PERIOP  ONCE IRR  Last administered on 10/2/17at 16:44;  Start 10/2/17 at 15:30

;  Stop 10/2/17 at 16:29;  Status DC


Etomidate (Amidate) 20 mg STK-MED ONCE IV ;  Start 10/2/17 at 15:40;  Stop 10/2/

17 at 15:41;  Status DC


Rocuronium Bromide (Zemuron) 100 mg STK-MED ONCE .ROUTE ;  Start 10/2/17 at 15:

40;  Stop 10/2/17 at 15:41;  Status DC


Cefazolin Sodium/ Dextrose 50 ml @ As Directed STK-MED ONCE IV ;  Start 10/2/17 

at 16:12;  Stop 10/2/17 at 16:13;  Status DC


Lidocaine HCl (Lidocaine Pf 2% Vial) 5 ml STK-MED ONCE .ROUTE ;  Start 10/2/17 

at 16:14;  Stop 10/2/17 at 16:15;  Status DC


Heparin Sodium (Porcine) (Heparin Sodium) 10,000 unit STK-MED ONCE .ROUTE ;  

Start 10/2/17 at 16:30;  Stop 10/2/17 at 16:31;  Status DC


Desflurane (Suprane) 30 ml STK-MED ONCE IH ;  Start 10/2/17 at 16:46;  Stop 10/2

/17 at 16:47;  Status DC


Ondansetron HCl (Zofran) 4 mg STK-MED ONCE .ROUTE ;  Start 10/2/17 at 17:30;  

Stop 10/2/17 at 17:31;  Status DC


Neostigmine Methylsulfate (Bloxiverz) 10 mg STK-MED ONCE .ROUTE ;  Start 10/2/

17 at 17:30;  Stop 10/2/17 at 17:31;  Status DC


Glycopyrrolate (Robinul) 1 mg STK-MED ONCE .ROUTE ;  Start 10/2/17 at 17:30;  

Stop 10/2/17 at 17:31;  Status DC


Labetalol HCl (Normodyne) 10 mg 1X  ONCE IVP  Last administered on 10/2/17at 18:

31;  Start 10/2/17 at 18:30;  Stop 10/2/17 at 18:31;  Status DC


Labetalol HCl (Normodyne) 10 mg PRN Q4HRS  PRN IVP HYPERTENSION, SEE COMMENTS 

Last administered on 10/4/17at 04:59;  Start 10/2/17 at 18:45


Ondansetron HCl (Zofran) 4 mg PRN Q6HRS  PRN IV NAUSEA/VOMITING;  Start 10/4/17 

at 07:00;  Stop 10/5/17 at 06:59


Fentanyl Citrate (Fentanyl 2ml Vial) 25 mcg PRN Q5MIN  PRN IV MILD PAIN;  Start 

10/4/17 at 07:00;  Stop 10/5/17 at 06:59


Fentanyl Citrate (Fentanyl 2ml Vial) 50 mcg PRN Q5MIN  PRN IV MODERATE PAIN;  

Start 10/4/17 at 07:00;  Stop 10/5/17 at 06:59


Morphine Sulfate 1 mg PRN Q10MIN  PRN IV SEVERE PAIN;  Start 10/4/17 at 07:00;  

Stop 10/5/17 at 06:59


Ringer's Solution 1,000 ml @  30 mls/hr Q24H IV ;  Start 10/4/17 at 07:00;  

Stop 10/4/17 at 15:26;  Status DC


Lidocaine HCl (Xylocaine-Mpf 1% Vial) 2 ml PRN 1X  PRN ID IV START;  Start 10/4/

17 at 07:00;  Stop 10/5/17 at 06:59


Hydromorphone HCl (Dilaudid) 0.5 mg PRN Q10MIN  PRN IV SEV PAIN, Second choice;

  Start 10/4/17 at 07:00;  Stop 10/5/17 at 06:59


Prochlorperazine Edisylate (Compazine) 5 mg PACU PRN  PRN IV NAUSEA, MRX1;  

Start 10/4/17 at 07:00;  Stop 10/5/17 at 06:59


Cefazolin Sodium 1 gm/Sodium Chloride 500 ml @  500 mls/hr 1X PERIOP  ONCE IRR  

Last administered on 10/4/17at 08:51;  Start 10/4/17 at 06:00;  Stop 10/4/17 at 

06:59;  Status DC


Potassium Chloride 70 meq/ Sodium Bicarbonate 12.5 meq/Lidocaine HCl 24 ml/

Parenteral Electrolytes 571.5 ml @  571.5 mls/ hr 1X PERIOP  ONCE IRR  Last 

administered on 10/4/17at 06:00;  Start 10/4/17 at 06:00;  Stop 10/4/17 at 06:59

;  Status DC


Potassium Chloride 15 meq/ Sodium Bicarbonate 12.5 meq/Parenteral Electrolytes 

520 ml @  520 mls/hr 1X PERIOP  ONCE IRR  Last administered on 10/4/17at 06:00;

  Start 10/4/17 at 06:00;  Stop 10/4/17 at 06:59;  Status DC


Heparin Sodium (Porcine) 68738 unit/Ringer's Solution 1,020 ml @  1,020 mls/hr 

1X PERIOP  ONCE IRR  Last administered on 10/4/17at 08:51;  Start 10/4/17 at 06:

00;  Stop 10/4/17 at 06:59;  Status DC


Insulin Human Regular 150 unit/ Sodium Chloride 151.5 ml @  0 mls/hr CONT  PRN 

IV SEE I/O RECORD;  Start 10/4/17 at 00:00


Lidocaine HCl 20 ml STK-MED ONCE .ROUTE ;  Start 10/3/17 at 20:10;  Stop 10/3/

17 at 20:11;  Status DC


Heparin Sodium/ Sodium Chloride 500 ml @  As Directed STK-MED ONCE .ROUTE ;  

Start 10/3/17 at 20:10;  Stop 10/3/17 at 20:11;  Status DC


Midazolam HCl (Versed) 2 mg STK-MED ONCE .ROUTE ;  Start 10/3/17 at 20:32;  

Stop 10/3/17 at 20:33;  Status DC


Heparin Sodium/ Sodium Chloride 1,000 unit 1X  ONCE IART  Last administered on 

10/3/17at 20:55;  Start 10/3/17 at 20:45;  Stop 10/3/17 at 20:48;  Status DC


Midazolam HCl (Versed) 1 mg 1X  ONCE IV  Last administered on 10/3/17at 20:55;  

Start 10/3/17 at 20:45;  Stop 10/3/17 at 20:48;  Status DC


Fentanyl Citrate (Fentanyl 2ml Vial) 50 mcg 1X  ONCE IV ;  Start 10/3/17 at 20:

45;  Stop 10/3/17 at 20:48;  Status DC


Lidocaine HCl 20 ml 1X  ONCE IJ  Last administered on 10/3/17at 20:55;  Start 10

/3/17 at 20:45;  Stop 10/3/17 at 20:48;  Status DC


Etomidate (Amidate) 20 mg STK-MED ONCE IV ;  Start 10/4/17 at 06:32;  Stop 10/4/

17 at 06:33;  Status DC


Lidocaine HCl (Lidocaine Pf 2% Vial) 5 ml STK-MED ONCE .ROUTE ;  Start 10/4/17 

at 06:32;  Stop 10/4/17 at 06:33;  Status DC


Rocuronium Bromide (Zemuron) 100 mg STK-MED ONCE .ROUTE ;  Start 10/4/17 at 06:

32;  Stop 10/4/17 at 06:33;  Status DC


Ephedrine Sulfate (Akovaz) 50 mg STK-MED ONCE .ROUTE ;  Start 10/4/17 at 06:32;

  Stop 10/4/17 at 06:33;  Status DC


Phenylephrine HCl (Fabián-Synephrine Inj) 10 mg STK-MED ONCE .ROUTE ;  Start 10/4/

17 at 06:33;  Stop 10/4/17 at 06:34;  Status DC


Aminocaproic Acid (Amicar) 5,000 mg STK-MED ONCE IV ;  Start 10/4/17 at 06:33;  

Stop 10/4/17 at 06:34;  Status DC


Heparin Sodium (Porcine) 30,000 unit STK-MED ONCE .ROUTE ;  Start 10/4/17 at 06:

33;  Stop 10/4/17 at 06:34;  Status DC


Nitroglycerin/ Dextrose 250 ml @  As Directed STK-MED ONCE IV ;  Start 10/4/17 

at 06:34;  Stop 10/4/17 at 06:35;  Status DC


Midazolam HCl (Versed) 2 mg STK-MED ONCE .ROUTE ;  Start 10/4/17 at 06:35;  

Stop 10/4/17 at 06:36;  Status DC


Sufentanil Citrate (Sufenta) 100 mcg STK-MED ONCE .ROUTE ;  Start 10/4/17 at 06:

35;  Stop 10/4/17 at 06:36;  Status DC


Vancomycin HCl (Vanco) 10 gm STK-MED ONCE .ROUTE  Last administered on 10/4/

17at 08:51;  Start 10/4/17 at 06:22;  Stop 10/4/17 at 07:22;  Status DC


Cellulose 1 each STK-MED ONCE .ROUTE  Last administered on 10/4/17at 08:51;  

Start 10/4/17 at 06:22;  Stop 10/4/17 at 07:22;  Status DC


Papaverine HCl 60 mg STK-MED ONCE .ROUTE  Last administered on 10/4/17at 08:51;

  Start 10/4/17 at 06:22;  Stop 10/4/17 at 07:22;  Status DC


Aspirin (Aspirin) 300 mg STK-MED ONCE .ROUTE  Last administered on 10/4/17at 13:

20;  Start 10/4/17 at 06:22;  Stop 10/4/17 at 07:22;  Status DC


Sodium Chloride (Sodium Chloride) 50 ml STK-MED ONCE IJ  Last administered on 10

/4/17at 08:51;  Start 10/4/17 at 06:22;  Stop 10/4/17 at 07:23;  Status DC


Nicardipine HCl (Cardene) 25 mg STK-MED ONCE IV ;  Start 10/4/17 at 07:47;  

Stop 10/4/17 at 07:48;  Status DC


Isoflurane (Isoflurane) 90 ml STK-MED ONCE IH ;  Start 10/4/17 at 07:48;  Stop 

10/4/17 at 07:49;  Status DC


Heparin Sodium (Porcine) (Heparin Sodium) 10,000 unit STK-MED ONCE .ROUTE ;  

Start 10/4/17 at 08:43;  Stop 10/4/17 at 08:44;  Status DC


Heparin Sodium (Porcine) (Heparin Sodium) 10,000 unit STK-MED ONCE .ROUTE ;  

Start 10/4/17 at 08:46;  Stop 10/4/17 at 08:47;  Status DC


Rocuronium Bromide (Zemuron) 100 mg STK-MED ONCE .ROUTE ;  Start 10/4/17 at 08:

48;  Stop 10/4/17 at 08:49;  Status DC


Rocuronium Bromide (Zemuron) 100 mg STK-MED ONCE .ROUTE ;  Start 10/4/17 at 08:

48;  Stop 10/4/17 at 08:49;  Status DC


Rocuronium Bromide (Zemuron) 100 mg STK-MED ONCE .ROUTE ;  Start 10/4/17 at 08:

49;  Stop 10/4/17 at 08:50;  Status DC


Protamine Sulfate 50 mg STK-MED ONCE IV ;  Start 10/4/17 at 09:52;  Stop 10/4/

17 at 09:53;  Status DC


Protamine Sulfate 250 mg STK-MED ONCE IV ;  Start 10/4/17 at 09:52;  Stop 10/4/

17 at 09:53;  Status DC


Heparin Sodium (Porcine) 94781 unit/Sodium Chloride 1,020 ml @  1,020 mls/hr 1X

  ONCE IV ;  Start 10/4/17 at 10:30;  Stop 10/4/17 at 10:30;  Status DC


Dexamethasone Sodium Phosphate (Decadron) 20 mg STK-MED ONCE .ROUTE ;  Start 10/

4/17 at 10:20;  Stop 10/4/17 at 10:21;  Status DC


Heparin Sodium (Porcine) 95618 unit/Sodium Chloride 1,020 ml @  1,020 mls/hr 1X

  ONCE IRR  Last administered on 10/4/17at 10:30;  Start 10/4/17 at 10:30;  

Stop 10/4/17 at 11:29;  Status DC


Midazolam HCl (Versed) 2 mg STK-MED ONCE .ROUTE ;  Start 10/4/17 at 10:48;  

Stop 10/4/17 at 10:49;  Status DC


Dextrose (Dextrose 50%-Water Syringe) 25 gm STK-MED ONCE IV ;  Start 10/4/17 at 

12:18;  Stop 10/4/17 at 12:19;  Status DC


Sufentanil Citrate (Sufenta) 100 mcg STK-MED ONCE .ROUTE ;  Start 10/4/17 at 12:

24;  Stop 10/4/17 at 12:25;  Status DC


Heparin Sodium (Porcine) (Heparin Sodium) 10,000 unit STK-MED ONCE .ROUTE ;  

Start 10/4/17 at 12:37;  Stop 10/4/17 at 12:38;  Status DC


Heparin Sodium (Porcine) 30,000 unit STK-MED ONCE .ROUTE ;  Start 10/4/17 at 12:

37;  Stop 10/4/17 at 12:38;  Status DC


Calcium Chloride 1,000 mg STK-MED ONCE IV ;  Start 10/4/17 at 12:38;  Stop 10/4/

17 at 12:39;  Status DC


Magnesium Sulfate 5 gm STK-MED ONCE .ROUTE ;  Start 10/4/17 at 12:38;  Stop 10/4

/17 at 12:39;  Status DC


Mannitol (Mannitol) 12.5 g STK-MED ONCE .ROUTE ;  Start 10/4/17 at 12:38;  Stop 

10/4/17 at 12:39;  Status DC


Lidocaine HCl (Lidocaine Pf 2% Vial) 5 ml STK-MED ONCE .ROUTE ;  Start 10/4/17 

at 12:38;  Stop 10/4/17 at 12:39;  Status DC


Albumin Human 100 ml @  As Directed STK-MED ONCE IV ;  Start 10/4/17 at 12:39;  

Stop 10/4/17 at 12:40;  Status DC


Sodium Bicarbonate 50 meq STK-MED ONCE .ROUTE ;  Start 10/4/17 at 12:39;  Stop 

10/4/17 at 12:40;  Status DC


Nicardipine HCl 50 mg/Sodium Chloride 270 ml @ 0 mls/hr CONT  PRN IV SEE I/O 

RECORD Last administered on 10/4/17at 14:12;  Start 10/4/17 at 13:30


Sodium Chloride (Normal Saline Flush) 3 ml PRN Q12HR  PRN IV AFTER MEDS AND 

BLOOD DRAWS;  Start 10/4/17 at 13:45


Ringer's Solution 1,000 ml @  30 mls/hr Q24H IV  Last administered on 10/4/17at 

14:13;  Start 10/4/17 at 13:34


Albumin Human 250 ml @  60 mls/hr PRN Q4HRS  PRN IV SEE I/O RECORD Last 

administered on 10/4/17at 18:25;  Start 10/4/17 at 13:45


Insulin Human Regular 150 unit/ Sodium Chloride 151.5 ml @  0 mls/hr CONT PRN  

PRN IV SEE I/O RECORD;  Start 10/4/17 at 13:45;  Stop 10/4/17 at 15:26;  Status 

DC


Dextrose (Dextrose 50%-Water Syringe) 25 gm PRN Q15MIN  PRN IV LOW BLOOD SUGAR;

  Start 10/4/17 at 13:45


Amiodarone HCl 150 mg/Dextrose 103 ml @  200 mls/hr ONCE  ONCE IV  Last 

administered on 10/4/17at 15:46;  Start 10/4/17 at 13:45;  Stop 10/4/17 at 14:15

;  Status DC


Amiodarone HCl 900 mg/Dextrose 518 ml @  33.33 mls/ hr CONT  PRN IV SEE I/O 

RECORD Last administered on 10/4/17at 15:46;  Start 10/4/17 at 13:45


Morphine Sulfate 2 mg PRN Q1HR  PRN IV PAIN Last administered on 10/4/17at 18:49

;  Start 10/4/17 at 13:45


Acetaminophen (Tylenol) 650 mg PRN Q4HRS  PRN PO MILD PAIN / TEMP;  Start 10/4/

17 at 13:45


Acetaminophen (Acetaminophen Supp) 650 mg PRN Q4HRS  PRN SC MILD PAIN / TEMP;  

Start 10/4/17 at 13:45


Meperidine HCl (Demerol) 12.5 mg PRN Q15MIN  PRN IV SHIVERING;  Start 10/4/17 

at 13:45;  Stop 10/5/17 at 13:34


Propofol 100 ml @ 0 mls/hr CONT PRN  PRN IV POSTOP SEDATION UNTIL EXTUBATE;  

Start 10/4/17 at 13:45;  Stop 10/4/17 at 18:25;  Status DC


Senna/Docusate Sodium (Senna Plus) 1 tab BID PO ;  Start 10/4/17 at 21:00


Bisacodyl (Dulcolax Supp) 10 mg PRN DAILY  PRN SC NO BOWEL MOVEMENT;  Start 10/4

/17 at 13:45


Aspirin (Ecotrin) 325 mg DAILYWBKFT PO ;  Start 10/5/17 at 08:00


Aspirin (Aspirin) 300 mg PRN DAILY  PRN SC IF UNABLE TO TAKE PO;  Start 10/4/17 

at 13:45


Albuterol Sulfate (Ventolin Neb Soln) 2.5 mg PRN Q4HRS  PRN NEB SHORTNESS OF 

BREATH;  Start 10/4/17 at 13:45


Metoprolol Tartrate (Lopressor) 25 mg BID PO ;  Start 10/5/17 at 09:00


Nicardipine HCl 50 mg/Sodium Chloride 270 ml @ 0 mls/hr CONT PRN  PRN IV PER 

PROTOCOL;  Start 10/4/17 at 13:45;  Stop 10/4/17 at 15:26;  Status DC


Oxycodone/ Acetaminophen (Percocet 5/325) 1 tab PRN Q4HRS  PRN PO MILD PAIN;  

Start 10/4/17 at 13:45


Oxycodone/ Acetaminophen (Percocet 5/325) 2 tab PRN Q4HRS  PRN PO MODERATE PAIN

, SEVERE PAIN;  Start 10/4/17 at 13:45


Cefazolin Sodium/ Dextrose 50 ml @  100 mls/hr Q24H IV ;  Start 10/5/17 at 08:00

;  Stop 10/6/17 at 08:29


Famotidine (Pepcid) 20 mg QHS IVP ;  Start 10/4/17 at 21:00


Polyethylene Glycol (miraLAX PACKET) 17 gm HS PO ;  Start 10/5/17 at 21:00


Nitroglycerin/ Dextrose 250 ml @ 0 mls/hr CONT  PRN IV SEE I/O RECORD Last 

administered on 10/4/17at 17:37;  Start 10/4/17 at 15:15


Sodium Bicarbonate 50 meq 1X  ONCE IV  Last administered on 10/4/17at 15:40;  

Start 10/4/17 at 15:30;  Stop 10/4/17 at 15:31;  Status DC


Sodium Bicarbonate (Sodium Bicarbonate) 650 mg TIDWMEALS PO ;  Start 10/5/17 at 

08:00


Sodium Bicarbonate 50 meq 1X  ONCE IV  Last administered on 10/4/17at 18:40;  

Start 10/4/17 at 19:00;  Stop 10/4/17 at 19:01;  Status DC


Metoclopramide HCl (Reglan) 10 mg 1X  ONCE IV ;  Start 10/4/17 at 19:15;  Stop 

10/4/17 at 19:15;  Status DC


Citric Acid/ Sodium Citrate (Bicitra) 30 ml 1X  ONCE PO ;  Start 10/4/17 at 19:

15;  Stop 10/4/17 at 19:15;  Status DC





Active Scripts


Active


Sodium Bicarbonate 650 Mg Tablet 650 Mg PO TID


Cozaar (Losartan Potassium) 50 Mg Tablet 100 Mg PO DAILY08


Gabapentin 300 Mg Capsule 300 Mg PO DAILY


Reported


Isosorbide Mononitrate 20 Mg Tablet 60 Mg PO DAILY


Clopidogrel (Clopidogrel Bisulfate) 75 Mg Tablet 1 Tab PO DAILY


Aspirin 325 Mg Tablet 1 Tab PO DAILY


Ranexa (Ranolazine) 500 Mg Tab.er.12h 1 Tab PO BID


Humulin N Kwikpen (Nph, Human Insulin Isophane) 100 Unit/1 Ml Insuln.pen 40 

Unit SQ BIDAC


Percocet  Mg Tablet (Oxycodone/Acetaminophen) 1 Each Tablet 1 Tab PO QID


Amlodipine Besylate 10 Mg Tablet 10 Mg PO DAILY


Ventolin Hfa Inhaler (Albuterol Sulfate) 18 Gm Hfa.aer.ad 2 Puff INH Q4HRS PRN


Carvedilol 25 Mg Tablet 25 Mg PO BID


Vitals/I & O





Vital Sign - Last 24 Hours








 10/3/17 10/3/17 10/3/17 10/3/17





 20:00 20:00 20:00 20:04


 


Temp  100.0  





  100.0  


 


Pulse  96 96 


 


Resp  18  16


 


B/P (MAP)  162/68 (99) 162/68 (99) 


 


Pulse Ox  98  97


 


O2 Delivery Room Air Room Air  Room Air


 


    





    





 10/3/17 10/3/17 10/3/17 10/3/17





 20:34 20:56 22:00 22:00


 


Pulse   98 98


 


Resp 20 16  14


 


B/P (MAP)    182/80 (114)


 


Pulse Ox 96 94  100


 


O2 Delivery Room Air   Room Air


 


O2 Flow Rate  4.0  





 10/3/17 10/3/17 10/3/17 10/3/17





 22:14 22:15 22:32 23:00


 


Pulse 18  104 92


 


Resp  18  9


 


B/P (MAP) 162/79  198/100 126/80 (95)


 


Pulse Ox  96  94


 


O2 Delivery  Room Air  Room Air





 10/3/17 10/3/17 10/3/17 10/3/17





 23:15 23:59 23:59 23:59


 


Temp  99.0  





  99.0  


 


Pulse  88 87 


 


Resp 16 10  


 


B/P (MAP)  148/74 (98)  


 


Pulse Ox 96 90  


 


O2 Delivery Room Air Room Air  Room Air


 


    





    





 10/4/17 10/4/17 10/4/17 10/4/17





 00:47 01:00 01:00 01:47


 


Pulse  90 87 


 


Resp 20 12  16


 


B/P (MAP)  116/92 (100)  


 


Pulse Ox 96 90  98


 


O2 Delivery Room Air Room Air  Room Air


 


O2 Flow Rate    4.0





 10/4/17 10/4/17 10/4/17 10/4/17





 01:52 02:00 02:00 03:00


 


Pulse  82 85 83


 


Resp 20  12 14


 


B/P (MAP)   162/80 (107) 135/71 (92)


 


Pulse Ox 96  98 96


 


O2 Delivery Room Air  Room Air Room Air





 10/4/17 10/4/17 10/4/17 10/4/17





 04:00 04:00 04:00 04:59


 


Temp   98.5 





   98.5 


 


Pulse  83 83 90


 


Resp   20 


 


B/P (MAP)   140/70 (93) 200/82


 


Pulse Ox   96 


 


O2 Delivery Room Air  Room Air 


 


    





    





 10/4/17 10/4/17 10/4/17 10/4/17





 05:00 05:00 06:00 06:00


 


Pulse 83 84 84 88


 


Resp 14   20


 


B/P (MAP) 135/75 (95)   163/75 (104)


 


Pulse Ox 96   97


 


O2 Delivery Room Air   Room Air





 10/4/17 10/4/17 10/4/17 10/4/17





 06:03 07:00 07:00 07:25


 


Temp  99.0  





  99.0  


 


Pulse  88 88 


 


Resp 20 18  


 


B/P (MAP)  140/67 (91) 140/67 (91) 


 


Pulse Ox 98 98  


 


O2 Delivery Room Air Room Air  Room Air


 


O2 Flow Rate    4.0


 


    





    





 10/4/17 10/4/17 10/4/17 10/4/17





 08:00 08:00 13:45 13:45


 


Temp  99.0 98.5 





  99.0 98.5 


 


Pulse 88 92 84 


 


Resp  18 14 


 


B/P (MAP)  150/69 (96) 140/64 (89) 


 


Pulse Ox  98 100 


 


O2 Delivery  Room Air Ventilator Mechanical Ventilator


 


    





    





 10/4/17 10/4/17 10/4/17 10/4/17





 13:45 14:11 14:30 15:00


 


Resp  14  


 


Pulse Ox 100 100  


 


O2 Delivery Ventilator Ventilator Ventilator Mechanical Ventilator





 10/4/17 10/4/17 10/4/17 10/4/17





 15:15 15:45 15:46 16:00


 


Pulse   96 


 


B/P (MAP)   130/62 


 


Pulse Ox  100  


 


O2 Delivery Ventilator Ventilator  Mechanical Ventilator





 10/4/17 10/4/17 10/4/17 10/4/17





 16:32 17:00 17:02 18:00


 


Resp 14  14 


 


Pulse Ox 100  100 


 


O2 Delivery Ventilator Mechanical Ventilator Ventilator Ventilator





 10/4/17 10/4/17  





 18:08 18:49  


 


Temp 99.9   





 99.9   


 


Pulse 88   


 


Resp 20 22  


 


B/P (MAP) 142/70 (94)   


 


Pulse Ox 100 99  


 


O2 Delivery Ventilator Nasal Cannula  


 


O2 Flow Rate  4.0  














Intake and Output   


 


 10/4/17 10/4/17 10/5/17





 15:00 23:00 07:00


 


Intake Total 250 ml 562 ml 


 


Output Total 20 ml 243 ml 


 


Balance 230 ml 319 ml 

















DAMIAN ONEAL MD Oct 4, 2017 19:15

## 2017-10-04 NOTE — PDOC
Renal-Progress Notes


Subjective Notes


Notes


NONE, IN OR NOW





History of Present Illness


Hx of present illness


NO CHANGE





Vitals


Vitals





Vital Signs








  Date Time  Temp Pulse Resp B/P (MAP) Pulse Ox O2 Delivery O2 Flow Rate FiO2


 


10/4/17 08:00 99.0 92 18 150/69 (96) 98 Room Air  





 99.0       


 


10/4/17 07:25       4.0 








Weight


Weight [ ]





I.O.


Intake and Output











Intake and Output 


 


 10/5/17





 07:00


 


Output Total 20 ml


 


Balance -20 ml


 


 


 


Output Urine Total 20 ml











Labs


Labs





Laboratory Tests








Test


  10/3/17


12:23 10/3/17


17:35 10/3/17


22:18 10/3/17


23:48


 


Glucose (Fingerstick)


  228 mg/dL


(70-99) 146 mg/dL


(70-99) 216 mg/dL


(70-99) 155 mg/dL


(70-99)


 


Test


  10/4/17


00:52 10/4/17


02:01 10/4/17


02:53 10/4/17


03:59


 


Glucose (Fingerstick)


  202 mg/dL


(70-99) 183 mg/dL


(70-99) 157 mg/dL


(70-99) 173 mg/dL


(70-99)


 


Test


  10/4/17


04:50 10/4/17


05:12 10/4/17


06:18 


 


 


White Blood Count


  8.8 x10^3/uL


(4.0-11.0) 


  


  


 


 


Red Blood Count


  3.27 x10^6/uL


(4.30-5.70) 


  


  


 


 


Hemoglobin


  9.4 g/dL


(13.0-17.5) 


  


  


 


 


Hematocrit


  27.5 %


(39.0-53.0) 


  


  


 


 


Mean Corpuscular Volume 84 fL ()    


 


Mean Corpuscular Hemoglobin 29 pg (25-35)    


 


Mean Corpuscular Hemoglobin


Concent 34 g/dL


(31-37) 


  


  


 


 


Red Cell Distribution Width


  14.1 %


(11.5-14.5) 


  


  


 


 


Platelet Count


  233 x10^3/uL


(140-400) 


  


  


 


 


Prothrombin Time


  14.1 SEC


(11.7-14.0) 


  


  


 


 


Prothromb Time International


Ratio 1.2 (0.8-1.1) 


  


  


  


 


 


Activated Partial


Thromboplast Time 37 SEC (24-38) 


  


  


  


 


 


Sodium Level


  138 mmol/L


(136-145) 


  


  


 


 


Potassium Level


  4.2 mmol/L


(3.5-5.1) 


  


  


 


 


Chloride Level


  101 mmol/L


() 


  


  


 


 


Carbon Dioxide Level


  25 mmol/L


(21-32) 


  


  


 


 


Anion Gap 12 (6-14)    


 


Blood Urea Nitrogen


  56 mg/dL


(8-26) 


  


  


 


 


Creatinine


  4.9 mg/dL


(0.7-1.3) 


  


  


 


 


Estimated GFR


(Cockcroft-Gault) 12.5 


  


  


  


 


 


Glucose Level


  137 mg/dL


(70-99) 


  


  


 


 


Calcium Level


  8.6 mg/dL


(8.5-10.1) 


  


  


 


 


Glucose (Fingerstick)


  


  132 mg/dL


(70-99) 79 mg/dL


(70-99) 


 











Review of Systems


Musculoskeletal:  Yes: muscle stiffness





Assessment


Assessment


IMP





SEVERE CAD


ANEMIA


DM II


HTN


ESRD


LEFT CAROTID ARTERY REPAIR





PLAN





IN OR NOW FOR CABG


WILL CHECK LABS POST AND ASSESS NEED FOR DIALYSIS TODAY











RACHEAL GUERRERO MD Oct 4, 2017 11:48

## 2017-10-04 NOTE — EKG
[address] [place] [address]

Test Date:    2017-10-04               Test Time:    13:57:23

Pat Name:     ARABELLA WASHINGTON           Department:   

Patient ID:   [id number]           Room:         105 1

Gender:       M                        Technician:   JONATHAN

:          1964               Requested By: MARIZA ISAAC

Order Number: [id number]            Reading MD:     

                                 Measurements

Intervals                              Axis          

Rate:         87                       P:            -90

AZ:           136                      QRS:          -42

QRSD:         94                       T:            36

QT:           358                                    

QTc:          431                                    

                           Interpretive Statements

SINUS RHYTHM

ABNORMAL LEFT AXIS DEVIATION

QRS(T) CONTOUR ABNORMALITY

CONSIDER ANTEROSEPTAL MYOCARDIAL DAMAGE

CONSISTENT WITH INFERIOR INFARCT

PROBABLY OLD

ABNORMAL ECG

RI6.01

Compared to ECG 2017 16:29:17

Myocardial infarct finding now present

## 2017-10-04 NOTE — PDOC
Provider Note


Provider Note


out of room, glucose good, no new labs , cont same











ARIC MATUTE MD Oct 4, 2017 09:05

## 2017-10-04 NOTE — RAD
Portable chest, 10/4/2017:



History: Postop evaluation



Comparison is made to a study from 9/27/2017. There has been an interval

median sternotomy. The tip of the ET tube lies in the right main bronchus and

should be withdrawn at least 4 cm. A right jugular dialysis type catheter

extends to the level of the atriocaval junction. A left subclavian [place]

type catheter extends into the main pulmonary artery region. An NG tube has

its tip lying in the proximal stomach just distal to the GE junction. 2

mediastinal drains and a left chest tube are now in place.



The heart size and pulmonary vascularity are normal. No pulmonary infiltrates

are seen. There is no evidence of pleural fluid or pneumothorax.





IMPRESSION:

1. Numerous tubes and catheters have been inserted as described above. Of note

is malposition of the ET tube extending into the right main bronchus.

2. No significant postoperative cardiopulmonary abnormality is detected.







Note: The findings were called to personnel in the ICU at 2:25 PM on

10/4/2017.

## 2017-10-05 NOTE — PDOC
PROGRESS NOTES


Subjective


Subjective


Patient up in the chair.





Has routine postop complaints





Objective


Objective





Vital Signs








  Date Time  Temp Pulse Resp B/P (MAP) Pulse Ox O2 Delivery O2 Flow Rate FiO2


 


10/5/17 17:00  84 27 104/62 (76) 98 Nasal Cannula 3.0 


 


10/5/17 16:00 98.9       





 98.9       














Intake and Output 


 


 10/6/17





 07:00


 


Intake Total 240 ml


 


Output Total 212 ml


 


Balance 28 ml


 


 


 


Intake Oral 240 ml


 


Output Urine Total 172 ml


 


Chest Tube Drainage Total 40 ml











Physical Exam


Physical Exam


No significant changes and cardiac exam





Assessment


Assessment


The patient is progressing well.





He looks a little dry. He may benefit from either some albumin or even better a 

transfusion.





Problems


Medical Problems:


(1) Chest pain


Status: Acute  








Comment


Review of Relevant


I have reviewed the following items juan (where applicable) has been applied.


Labs





Laboratory Tests








Test


  10/3/17


22:18 10/3/17


23:48 10/4/17


00:52 10/4/17


02:01


 


Glucose (Fingerstick)


  216 mg/dL


(70-99) 155 mg/dL


(70-99) 202 mg/dL


(70-99) 183 mg/dL


(70-99)


 


Test


  10/4/17


02:53 10/4/17


03:59 10/4/17


04:50 10/4/17


05:12


 


Glucose (Fingerstick)


  157 mg/dL


(70-99) 173 mg/dL


(70-99) 


  132 mg/dL


(70-99)


 


White Blood Count


  


  


  8.8 x10^3/uL


(4.0-11.0) 


 


 


Red Blood Count


  


  


  3.27 x10^6/uL


(4.30-5.70) 


 


 


Hemoglobin


  


  


  9.4 g/dL


(13.0-17.5) 


 


 


Hematocrit


  


  


  27.5 %


(39.0-53.0) 


 


 


Mean Corpuscular Volume   84 fL ()  


 


Mean Corpuscular Hemoglobin   29 pg (25-35)  


 


Mean Corpuscular Hemoglobin


Concent 


  


  34 g/dL


(31-37) 


 


 


Red Cell Distribution Width


  


  


  14.1 %


(11.5-14.5) 


 


 


Platelet Count


  


  


  233 x10^3/uL


(140-400) 


 


 


Prothrombin Time


  


  


  14.1 SEC


(11.7-14.0) 


 


 


Prothromb Time International


Ratio 


  


  1.2 (0.8-1.1) 


  


 


 


Activated Partial


Thromboplast Time 


  


  37 SEC (24-38) 


  


 


 


Sodium Level


  


  


  138 mmol/L


(136-145) 


 


 


Potassium Level


  


  


  4.2 mmol/L


(3.5-5.1) 


 


 


Chloride Level


  


  


  101 mmol/L


() 


 


 


Carbon Dioxide Level


  


  


  25 mmol/L


(21-32) 


 


 


Anion Gap   12 (6-14)  


 


Blood Urea Nitrogen


  


  


  56 mg/dL


(8-26) 


 


 


Creatinine


  


  


  4.9 mg/dL


(0.7-1.3) 


 


 


Estimated GFR


(Cockcroft-Gault) 


  


  12.5 


  


 


 


Glucose Level


  


  


  137 mg/dL


(70-99) 


 


 


Calcium Level


  


  


  8.6 mg/dL


(8.5-10.1) 


 


 


Test


  10/4/17


06:18 10/4/17


08:30 10/4/17


08:32 10/4/17


09:44


 


Glucose (Fingerstick)


  79 mg/dL


(70-99) 


  


  


 


 


Bedside Hemoglobin


(Calculated) 


  8.8 g/dL


(14-18) 


  8.5 g/dL


(14-18)


 


Bedside Hematocrit  26 % (37-52)   25 % (37-52) 


 


Bedside Arterial pH


  


  7.41


(7.35-7.45) 


  7.40


(7.35-7.45)


 


Bedside Arterial pCO2


  


  36 mmHg


(35-45) 


  37 mmHg


(35-45)


 


Bedside Arterial pO2


  


  177 mmHg


() 


  331 mmHg


()


 


Bedside Arterial HCO3


  


  23 mmol/L


(21-28) 


  23 mmol/L


(21-28)


 


Bedside Arterial Total CO2


  


  24 mmol/L


(21-32) 


  24 mmol/L


(21-32)


 


Arterial Bld O2 Saturation


(Measur) 


  100 % (95-99) 


  


  100 % (95-99) 


 


 


Bedside Arterial Blood Base


Excess 


  -2 mmol/L


(0-3) 


  -2 mmol/L


(0-3)


 


Bedside FiO2  100.0   100.0 


 


Bedside Sodium


  


  139 mmol/L


(135-145) 


  136 mmol/L


(135-145)


 


Bedside Potassium


  


  4.5 mmol/L


(3.5-5.0) 


  4.6 mmol/L


(3.5-5.0)


 


Glucose Level


  


  108 mg/dL


(70-99) 


  117 mg/dL


(70-99)


 


Bedside Ionized Calcium (Adve)


  


  1.11 mmol/L


(1.13-1.32) 


  1.08 mmol/L


(1.13-1.32)


 


Activated Clotting Time


  


  


  96 SEC


() 


 


 


Test


  10/4/17


09:48 10/4/17


10:37 10/4/17


10:40 10/4/17


11:11


 


Activated Clotting Time


  556 SEC


() 


  466 SEC


() 


 


 


Bedside Hemoglobin


(Calculated) 


  7.5 g/dL


(14-18) 


  7.5 g/dL


(14-18)


 


Bedside Hematocrit  22 % (37-52)   22 % (37-52) 


 


Bedside Arterial pH


  


  7.37


(7.35-7.45) 


  7.43


(7.35-7.45)


 


Bedside Arterial pCO2


  


  38 mmHg


(35-45) 


  36 mmHg


(35-45)


 


Bedside Arterial pO2


  


  331 mmHg


() 


  286 mmHg


()


 


Bedside Arterial HCO3


  


  22 mmol/L


(21-28) 


  24 mmol/L


(21-28)


 


Bedside Arterial Total CO2


  


  23 mmol/L


(21-32) 


  25 mmol/L


(21-32)


 


Arterial Bld O2 Saturation


(Measur) 


  100 % (95-99) 


  


  100 % (95-99) 


 


 


Bedside Arterial Blood Base


Excess 


  -3 mmol/L


(0-3) 


  -1 mmol/L


(0-3)


 


Bedside FiO2  75.0   75.0 


 


Bedside Sodium


  


  136 mmol/L


(135-145) 


  137 mmol/L


(135-145)


 


Bedside Potassium


  


  6.1 mmol/L


(3.5-5.0) 


  5.3 mmol/L


(3.5-5.0)


 


Glucose Level


  


  115 mg/dL


(70-99) 


  130 mg/dL


(70-99)


 


Bedside Ionized Calcium (Dave)


  


  0.98 mmol/L


(1.13-1.32) 


  1.02 mmol/L


(1.13-1.32)


 


Test


  10/4/17


11:15 10/4/17


11:41 10/4/17


11:44 10/4/17


11:55


 


Activated Clotting Time


  459 SEC


() 


  494 SEC


() 


 


 


Bedside Hemoglobin


(Calculated) 


  7.5 g/dL


(14-18) 


  


 


 


Bedside Hematocrit  22 % (37-52)   23 % (37-52) 


 


Bedside Arterial pH


  


  7.39


(7.35-7.45) 


  


 


 


Bedside Arterial pCO2


  


  42 mmHg


(35-45) 


  


 


 


Bedside Arterial pO2


  


  254 mmHg


() 


  


 


 


Bedside Arterial HCO3


  


  25 mmol/L


(21-28) 


  


 


 


Bedside Arterial Total CO2


  


  27 mmol/L


(21-32) 


  


 


 


Arterial Bld O2 Saturation


(Measur) 


  100 % (95-99) 


  


  


 


 


Bedside Arterial Blood Base


Excess 


  0 mmol/L (0-3) 


  


  


 


 


Bedside FiO2  75.0   75 


 


Bedside Sodium


  


  137 mmol/L


(135-145) 


  137 mmol/L


(135-145)


 


Bedside Potassium


  


  6.0 mmol/L


(3.5-5.0) 


  5.9 mmol/L


(3.5-5.0)


 


Glucose Level


  


  163 mg/dL


(70-99) 


  169 mg/dL


(70-99)


 


Bedside Ionized Calcium (Dave)


  


  1.04 mmol/L


(1.13-1.32) 


  1.06 mmol/L


(1.13-1.32)


 


Bedside Venous pH


  


  


  


  7.35


(7.32-7.42)


 


Bedside Venous pCO2


  


  


  


  41 mmHg


(41-51)


 


Bedside Venous pO2


  


  


  


  38 mmHg


(20-40)


 


Bedside Venous HCO3


  


  


  


  23 mmol/L


(24-28)


 


Bedside Venous Blood Total CO2


  


  


  


  24 mmol/L


(21-32)


 


Bedside Venous Blood O2


Saturation 


  


  


  69 % 


 


 


Bedside Venous Blood Base


Excess 


  


  


  -3 mmol/L


(0-3)


 


POC Venous Hemoglobin (Calc)


  


  


  


  7.8 g/dL


(14-18)


 


Test


  10/4/17


12:10 10/4/17


12:14 10/4/17


12:35 10/4/17


12:39


 


Bedside Hemoglobin


(Calculated) 7.1 g/dL


(14-18) 


  7.5 g/dL


(14-18) 


 


 


Bedside Hematocrit 21 % (37-52)   22 % (37-52)  


 


Bedside Arterial pH


  7.35


(7.35-7.45) 


  7.37


(7.35-7.45) 


 


 


Bedside Arterial pCO2


  44 mmHg


(35-45) 


  42 mmHg


(35-45) 


 


 


Bedside Arterial pO2


  305 mmHg


() 


  441 mmHg


() 


 


 


Bedside Arterial HCO3


  24 mmol/L


(21-28) 


  24 mmol/L


(21-28) 


 


 


Bedside Arterial Total CO2


  25 mmol/L


(21-32) 


  25 mmol/L


(21-32) 


 


 


Arterial Bld O2 Saturation


(Measur) 100 % (95-99) 


  


  100 % (95-99) 


  


 


 


Bedside Arterial Blood Base


Excess -1 mmol/L


(0-3) 


  -1 mmol/L


(0-3) 


 


 


Bedside FiO2 80.0   100.0  


 


Bedside Sodium


  135 mmol/L


(135-145) 


  135 mmol/L


(135-145) 


 


 


Bedside Potassium


  6.0 mmol/L


(3.5-5.0) 


  5.5 mmol/L


(3.5-5.0) 


 


 


Glucose Level


  164 mg/dL


(70-99) 


  221 mg/dL


(70-99) 


 


 


Bedside Ionized Calcium (Dave)


  1.02 mmol/L


(1.13-1.32) 


  1.52 mmol/L


(1.13-1.32) 


 


 


Activated Clotting Time


  


  492 SEC


() 


  99 SEC


()


 


Test


  10/4/17


13:05 10/4/17


13:10 10/4/17


14:10 10/4/17


14:12


 


Bedside Hemoglobin


(Calculated) 8.5 g/dL


(14-18) 


  


  


 


 


Bedside Hematocrit 25 % (37-52)    


 


Bedside Arterial pH


  7.36


(7.35-7.45) 


  


  


 


 


Bedside Arterial pCO2


  42 mmHg


(35-45) 


  


  


 


 


Bedside Arterial pO2


  488 mmHg


() 


  


  


 


 


Bedside Arterial HCO3


  24 mmol/L


(21-28) 


  


  


 


 


Bedside Arterial Total CO2


  25 mmol/L


(21-32) 


  


  


 


 


Arterial Bld O2 Saturation


(Measur) 100 % (95-99) 


  


  


  


 


 


Bedside Arterial Blood Base


Excess -2 mmol/L


(0-3) 


  


  


 


 


Bedside FiO2 100.0    


 


Bedside Sodium


  138 mmol/L


(135-145) 


  


  


 


 


Bedside Potassium


  4.8 mmol/L


(3.5-5.0) 


  


  


 


 


Glucose Level


  191 mg/dL


(70-99) 


  159 mg/dL


(70-99) 


 


 


Bedside Ionized Calcium (Dave)


  1.36 mmol/L


(1.13-1.32) 


  


  


 


 


White Blood Count


  


  12.5 x10^3/uL


(4.0-11.0) 16.3 x10^3/uL


(4.0-11.0) 


 


 


Hemoglobin


  


  8.2 g/dL


(13.0-17.5) 9.2 g/dL


(13.0-17.5) 


 


 


Hematocrit


  


  24.4 %


(39.0-53.0) 27.5 %


(39.0-53.0) 


 


 


Platelet Count


  


  155 x10^3/uL


(140-400) 204 x10^3/uL


(140-400) 


 


 


Prothrombin Time


  


  17.6 SEC


(11.7-14.0) 16.2 SEC


(11.7-14.0) 


 


 


Prothromb Time International


Ratio 


  1.5 (0.8-1.1) 


  1.4 (0.8-1.1) 


  


 


 


Activated Partial


Thromboplast Time 


  42 SEC (24-38) 


  42 SEC (24-38) 


  


 


 


Fibrinogen


  


  394 mg/dL


(200-440) 


  


 


 


Red Blood Count


  


  


  3.24 x10^6/uL


(4.30-5.70) 


 


 


Mean Corpuscular Volume   85 fL ()  


 


Mean Corpuscular Hemoglobin   29 pg (25-35)  


 


Mean Corpuscular Hemoglobin


Concent 


  


  34 g/dL


(31-37) 


 


 


Red Cell Distribution Width


  


  


  14.3 %


(11.5-14.5) 


 


 


O2 Saturation   99 % (92-99)  


 


Arterial Blood pH


  


  


  7.36


(7.35-7.45) 


 


 


Arterial Blood pCO2 at


Patient Temp 


  


  37 mmHg


(35-46) 


 


 


Arterial Blood pO2 at Patient


Temp 


  


  228 mmHg


() 


 


 


Arterial Blood HCO3


  


  


  20 mmol/L


(21-28) 


 


 


Arterial Blood Base Excess


  


  


  -5 mmol/L


(-3-3) 


 


 


Oxyhemoglobin   98.3 %  


 


Methemoglobin


  


  


  0.3 %


(0.0-1.9) 


 


 


Carbon Monoxide, Quantitative


  


  


  0.1 %


(0.0-1.9) 


 


 


FiO2   100  


 


Sodium Level


  


  


  139 mmol/L


(136-145) 


 


 


Potassium Level


  


  


  4.8 mmol/L


(3.5-5.1) 


 


 


Chloride Level


  


  


  104 mmol/L


() 


 


 


Carbon Dioxide Level


  


  


  23 mmol/L


(21-32) 


 


 


Anion Gap   12 (6-14)  


 


Blood Urea Nitrogen


  


  


  55 mg/dL


(8-26) 


 


 


Creatinine


  


  


  4.8 mg/dL


(0.7-1.3) 


 


 


Estimated GFR


(Cockcroft-Gault) 


  


  12.8 


  


 


 


Calcium Level


  


  


  9.6 mg/dL


(8.5-10.1) 


 


 


Magnesium Level


  


  


  3.1 mg/dL


(1.8-2.4) 


 


 


Glucose (Fingerstick)


  


  


  


  161 mg/dL


(70-99)


 


Test


  10/4/17


15:11 10/4/17


16:15 10/4/17


17:06 10/4/17


18:00


 


Glucose (Fingerstick)


  161 mg/dL


(70-99) 187 mg/dL


(70-99) 151 mg/dL


(70-99) 


 


 


White Blood Count


  


  


  


  11.6 x10^3/uL


(4.0-11.0)


 


Red Blood Count


  


  


  


  2.82 x10^6/uL


(4.30-5.70)


 


Hemoglobin


  


  


  


  7.8 g/dL


(13.0-17.5)


 


Hematocrit


  


  


  


  23.8 %


(39.0-53.0)


 


Mean Corpuscular Volume    84 fL () 


 


Mean Corpuscular Hemoglobin    28 pg (25-35) 


 


Mean Corpuscular Hemoglobin


Concent 


  


  


  33 g/dL


(31-37)


 


Red Cell Distribution Width


  


  


  


  14.1 %


(11.5-14.5)


 


Platelet Count


  


  


  


  197 x10^3/uL


(140-400)


 


Potassium Level


  


  


  


  4.7 mmol/L


(3.5-5.1)


 


Test


  10/4/17


18:06 10/4/17


18:25 10/4/17


19:19 10/4/17


20:19


 


Glucose (Fingerstick)


  148 mg/dL


(70-99) 


  138 mg/dL


(70-99) 157 mg/dL


(70-99)


 


O2 Saturation  98 % (92-99)   


 


Arterial Blood pH


  


  7.40


(7.35-7.45) 


  


 


 


Arterial Blood pCO2 at


Patient Temp 


  36 mmHg


(35-46) 


  


 


 


Arterial Blood pO2 at Patient


Temp 


  148 mmHg


() 


  


 


 


Arterial Blood HCO3


  


  21 mmol/L


(21-28) 


  


 


 


Arterial Blood Base Excess


  


  -3 mmol/L


(-3-3) 


  


 


 


FiO2  40   


 


Test


  10/4/17


21:18 10/4/17


23:32 10/5/17


00:21 10/5/17


01:22


 


Glucose (Fingerstick)


  159 mg/dL


(70-99) 126 mg/dL


(70-99) 124 mg/dL


(70-99) 121 mg/dL


(70-99)


 


Test


  10/5/17


02:18 10/5/17


03:18 10/5/17


04:18 10/5/17


05:20


 


Glucose (Fingerstick)


  135 mg/dL


(70-99) 151 mg/dL


(70-99) 160 mg/dL


(70-99) 


 


 


White Blood Count


  


  


  


  13.2 x10^3/uL


(4.0-11.0)


 


Red Blood Count


  


  


  


  2.83 x10^6/uL


(4.30-5.70)


 


Hemoglobin


  


  


  


  8.0 g/dL


(13.0-17.5)


 


Hematocrit


  


  


  


  24.1 %


(39.0-53.0)


 


Mean Corpuscular Volume    85 fL () 


 


Mean Corpuscular Hemoglobin    28 pg (25-35) 


 


Mean Corpuscular Hemoglobin


Concent 


  


  


  33 g/dL


(31-37)


 


Red Cell Distribution Width


  


  


  


  14.0 %


(11.5-14.5)


 


Platelet Count


  


  


  


  187 x10^3/uL


(140-400)


 


Sodium Level


  


  


  


  141 mmol/L


(136-145)


 


Potassium Level


  


  


  


  4.9 mmol/L


(3.5-5.1)


 


Chloride Level


  


  


  


  105 mmol/L


()


 


Carbon Dioxide Level


  


  


  


  24 mmol/L


(21-32)


 


Anion Gap    12 (6-14) 


 


Blood Urea Nitrogen


  


  


  


  60 mg/dL


(8-26)


 


Creatinine


  


  


  


  4.9 mg/dL


(0.7-1.3)


 


Estimated GFR


(Cockcroft-Gault) 


  


  


  12.5 


 


 


Glucose Level


  


  


  


  179 mg/dL


(70-99)


 


Calcium Level


  


  


  


  8.8 mg/dL


(8.5-10.1)


 


Magnesium Level


  


  


  


  2.8 mg/dL


(1.8-2.4)


 


Test


  10/5/17


05:21 10/5/17


06:25 10/5/17


07:31 10/5/17


08:40


 


Glucose (Fingerstick)


  167 mg/dL


(70-99) 170 mg/dL


(70-99) 159 mg/dL


(70-99) 123 mg/dL


(70-99)


 


Test


  10/5/17


09:47 10/5/17


10:51 10/5/17


11:59 10/5/17


13:06


 


Glucose (Fingerstick)


  120 mg/dL


(70-99) 121 mg/dL


(70-99) 149 mg/dL


(70-99) 214 mg/dL


(70-99)


 


Test


  10/5/17


14:04 10/5/17


15:13 


  


 


 


Glucose (Fingerstick)


  210 mg/dL


(70-99) 244 mg/dL


(70-99) 


  


 








Laboratory Tests








Test


  10/4/17


19:19 10/4/17


20:19 10/4/17


21:18 10/4/17


23:32


 


Glucose (Fingerstick)


  138 mg/dL


(70-99) 157 mg/dL


(70-99) 159 mg/dL


(70-99) 126 mg/dL


(70-99)


 


Test


  10/5/17


00:21 10/5/17


01:22 10/5/17


02:18 10/5/17


03:18


 


Glucose (Fingerstick)


  124 mg/dL


(70-99) 121 mg/dL


(70-99) 135 mg/dL


(70-99) 151 mg/dL


(70-99)


 


Test


  10/5/17


04:18 10/5/17


05:20 10/5/17


05:21 10/5/17


06:25


 


Glucose (Fingerstick)


  160 mg/dL


(70-99) 


  167 mg/dL


(70-99) 170 mg/dL


(70-99)


 


White Blood Count


  


  13.2 x10^3/uL


(4.0-11.0) 


  


 


 


Red Blood Count


  


  2.83 x10^6/uL


(4.30-5.70) 


  


 


 


Hemoglobin


  


  8.0 g/dL


(13.0-17.5) 


  


 


 


Hematocrit


  


  24.1 %


(39.0-53.0) 


  


 


 


Mean Corpuscular Volume  85 fL ()   


 


Mean Corpuscular Hemoglobin  28 pg (25-35)   


 


Mean Corpuscular Hemoglobin


Concent 


  33 g/dL


(31-37) 


  


 


 


Red Cell Distribution Width


  


  14.0 %


(11.5-14.5) 


  


 


 


Platelet Count


  


  187 x10^3/uL


(140-400) 


  


 


 


Sodium Level


  


  141 mmol/L


(136-145) 


  


 


 


Potassium Level


  


  4.9 mmol/L


(3.5-5.1) 


  


 


 


Chloride Level


  


  105 mmol/L


() 


  


 


 


Carbon Dioxide Level


  


  24 mmol/L


(21-32) 


  


 


 


Anion Gap  12 (6-14)   


 


Blood Urea Nitrogen


  


  60 mg/dL


(8-26) 


  


 


 


Creatinine


  


  4.9 mg/dL


(0.7-1.3) 


  


 


 


Estimated GFR


(Cockcroft-Gault) 


  12.5 


  


  


 


 


Glucose Level


  


  179 mg/dL


(70-99) 


  


 


 


Calcium Level


  


  8.8 mg/dL


(8.5-10.1) 


  


 


 


Magnesium Level


  


  2.8 mg/dL


(1.8-2.4) 


  


 


 


Test


  10/5/17


07:31 10/5/17


08:40 10/5/17


09:47 10/5/17


10:51


 


Glucose (Fingerstick)


  159 mg/dL


(70-99) 123 mg/dL


(70-99) 120 mg/dL


(70-99) 121 mg/dL


(70-99)


 


Test


  10/5/17


11:59 10/5/17


13:06 10/5/17


14:04 10/5/17


15:13


 


Glucose (Fingerstick)


  149 mg/dL


(70-99) 214 mg/dL


(70-99) 210 mg/dL


(70-99) 244 mg/dL


(70-99)








Medications





Current Medications


Iodixanol (Visipaque 320) 100 ml STK-MED ONCE .ROUTE ;  Start 9/27/17 at 11:27;

  Stop 9/27/17 at 11:28;  Status DC


Lidocaine HCl 20 ml STK-MED ONCE .ROUTE ;  Start 9/27/17 at 11:27;  Stop 9/27/ 17 at 11:28;  Status DC


Heparin Sodium/ Sodium Chloride 1,000 ml @  As Directed STK-MED ONCE .ROUTE ;  

Start 9/27/17 at 11:28;  Stop 9/27/17 at 11:29;  Status DC


Ondansetron HCl (Zofran) 4 mg PRN Q8HRS  PRN IV NAUSEA/VOMITING;  Start 9/27/17 

at 12:30;  Stop 9/28/17 at 12:29;  Status DC


Fentanyl Citrate (Fentanyl 2ml Vial) 100 mcg STK-MED ONCE .ROUTE ;  Start 9/27/ 17 at 12:33;  Stop 9/27/17 at 12:34;  Status DC


Midazolam HCl (Versed) 5 mg STK-MED ONCE .ROUTE ;  Start 9/27/17 at 12:33;  

Stop 9/27/17 at 12:34;  Status DC


Heparin Sodium (Porcine) (Heparin Sodium) 10,000 unit STK-MED ONCE .ROUTE ;  

Start 9/27/17 at 12:59;  Stop 9/27/17 at 13:00;  Status DC


Heparin Sodium/ Dextrose 500 ml @  As Directed STK-MED ONCE IV ;  Start 9/27/17 

at 13:21;  Stop 9/27/17 at 13:22;  Status DC


Heparin Sodium/ Sodium Chloride 1,000 unit 1X  ONCE IART  Last administered on 9 /27/17at 13:40;  Start 9/27/17 at 13:00;  Stop 9/27/17 at 13:47;  Status DC


Midazolam HCl (Versed) 5 mg 1X  ONCE IV  Last administered on 9/27/17at 13:41;  

Start 9/27/17 at 13:00;  Stop 9/27/17 at 13:47;  Status DC


Fentanyl Citrate (Fentanyl 2ml Vial) 100 mcg 1X  ONCE IV  Last administered on 9 /27/17at 13:42;  Start 9/27/17 at 13:00;  Stop 9/27/17 at 13:47;  Status DC


Iodixanol (Visipaque 320) 100 ml 1X  ONCE IART  Last administered on 9/27/17at 

13:40;  Start 9/27/17 at 13:00;  Stop 9/27/17 at 13:47;  Status DC


Heparin Sodium (Porcine) (Heparin Sodium) 8,000 unit 1X  ONCE IV  Last 

administered on 9/27/17at 13:43;  Start 9/27/17 at 13:00;  Stop 9/27/17 at 13:47

;  Status DC


Lidocaine HCl 20 ml 1X  ONCE IJ  Last administered on 9/27/17at 13:40;  Start 9/ 27/17 at 13:00;  Stop 9/27/17 at 13:47;  Status DC


Heparin Sodium (Porcine) (Heparin 5,000 Units/1,000ml NS) 5,000 unit 1X  ONCE 

IV  Last administered on 9/27/17at 13:00;  Start 9/27/17 at 13:00;  Stop 9/27/ 17 at 13:47;  Status DC


Heparin Sodium/ Dextrose 500 ml @  20 mls/hr CONT PRN  PRN IV SEE COMMENTS Last 

administered on 9/27/17at 13:45;  Start 9/27/17 at 13:00;  Stop 9/28/17 at 09:04

;  Status DC


Oxycodone/ Acetaminophen (Percocet 10/325) 1 tab PRN Q6HRS  PRN PO PAIN Last 

administered on 9/30/17at 01:11;  Start 9/27/17 at 16:45;  Stop 9/30/17 at 12:30

;  Status DC


Cyclobenzaprine HCl (Flexeril) 10 mg PRN Q6HRS  PRN PO MUSCLE SPASMS Last 

administered on 10/4/17at 01:51;  Start 9/27/17 at 16:45


Diazepam (Valium) 5 mg PRN Q6HRS  PRN PO ANXIETY Last administered on 10/1/17at 

08:55;  Start 9/27/17 at 16:45;  Stop 10/1/17 at 12:32;  Status DC


Polyethylene Glycol (miraLAX PACKET) 17 gm DAILY PO ;  Start 9/28/17 at 09:00;  

Stop 9/28/17 at 09:00;  Status DC


Polyethylene Glycol (miraLAX PACKET) 17 gm DAILY PO  Last administered on 10/5/

17at 12:12;  Start 9/27/17 at 21:00


Fentanyl Citrate (Fentanyl 2ml Vial) 50 mcg PRN Q3HRS  PRN IV SEVERE PAIN Last 

administered on 10/5/17at 10:45;  Start 9/28/17 at 01:45


Zolpidem Tartrate (Ambien) 5 mg PRN QHS  PRN PO INSOMNIA, MAY REPEAT IN 1HR 

Last administered on 9/28/17at 21:06;  Start 9/28/17 at 09:30


Cefazolin Sodium/ Dextrose 50 ml @  100 mls/hr 1X  ONCE IV  Last administered 

on 10/3/17at 08:32;  Start 10/3/17 at 06:00;  Stop 10/3/17 at 06:29;  Status DC


Amlodipine Besylate (Norvasc) 10 mg DAILY PO  Last administered on 10/3/17at 08:

48;  Start 9/28/17 at 11:00;  Stop 10/4/17 at 14:06;  Status DC


Aspirin (Myles Aspirin) 325 mg DAILY PO ;  Start 9/28/17 at 11:00;  Stop 9/28/ 17 at 11:00;  Status DC


Clopidogrel Bisulfate (Plavix) 75 mg DAILY PO ;  Start 9/28/17 at 11:00;  Stop 9 /28/17 at 11:00;  Status DC


Isosorbide Mononitrate (Imdur) 30 mg DAILY PO  Last administered on 10/3/17at 08

:48;  Start 9/28/17 at 11:00;  Stop 10/4/17 at 14:06;  Status DC


Losartan Potassium (Cozaar) 100 mg DAILY08 PO ;  Start 9/28/17 at 11:00;  Stop 9 /28/17 at 11:00;  Status DC


Oxycodone/ Acetaminophen (Percocet 10/325) 1 tab QID PO  Last administered on 10

/3/17at 22:15;  Start 9/28/17 at 13:00;  Stop 10/4/17 at 18:25;  Status DC


Ranolazine (Ranexa) 500 mg BID PO  Last administered on 10/3/17at 22:14;  Start 

9/28/17 at 11:00;  Stop 10/4/17 at 14:06;  Status DC


Sodium Bicarbonate (Sodium Bicarbonate) 650 mg TID PO  Last administered on 10/3

/17at 22:13;  Start 9/28/17 at 14:00;  Stop 10/4/17 at 18:28;  Status DC


Oxycodone/ Acetaminophen (Percocet 10/325) 1 tab PRN Q6HRS  PRN PO PAIN Last 

administered on 10/4/17at 00:47;  Start 9/28/17 at 10:30;  Stop 10/4/17 at 18:25

;  Status DC


Darbepoetin Benny (Aranesp) 60 mcg Th SQ  Last administered on 9/28/17at 21:02;  

Start 9/28/17 at 21:00


Insulin Aspart (NovoLOG) 10 units 1X  ONCE SQ  Last administered on 9/28/17at 21

:40;  Start 9/28/17 at 22:00;  Stop 9/28/17 at 22:01;  Status DC


Insulin Detemir (Levemir) 40 units BID SQ  Last administered on 9/30/17at 09:20

;  Start 9/28/17 at 22:00;  Stop 9/30/17 at 12:31;  Status DC


Sodium Chloride 1,000 ml @  1,000 mls/hr Q1H PRN IV hypotension;  Start 9/29/17 

at 06:52;  Stop 9/29/17 at 12:51;  Status DC


Sodium Chloride (Normal Saline Flush) 10 ml 1X PRN  PRN IV AP catheter pack;  

Start 9/29/17 at 07:00;  Stop 9/30/17 at 06:59;  Status DC


Sodium Chloride (Normal Saline Flush) 10 ml 1X PRN  PRN IV  catheter pack;  

Start 9/29/17 at 07:00;  Stop 9/30/17 at 06:59;  Status DC


Info (PHARMACY MONITORING -- do not chart) 1 each PRN DAILY  PRN MC SEE COMMENTS

;  Start 9/29/17 at 07:00


Insulin Aspart (NovoLOG) 10 units TIDAC  PRN SQ BLOOD SUGAR > 200 Last 

administered on 10/1/17at 08:51;  Start 9/29/17 at 18:15;  Stop 10/1/17 at 10:29

;  Status DC


Insulin Aspart (NovoLOG) 20 units TIDAC  PRN SQ BLOOD SUGAR > 300 Last 

administered on 9/29/17at 18:34;  Start 9/29/17 at 18:15;  Stop 10/1/17 at 10:29

;  Status DC


Insulin Detemir (Levemir) 45 units BID SQ  Last administered on 10/1/17at 08:50

;  Start 9/30/17 at 21:00;  Stop 10/1/17 at 10:29;  Status DC


Diazepam (Valium) 2 mg PRN Q8HRS  PRN PO ANXIETY;  Start 9/30/17 at 12:30;  

Stop 10/1/17 at 12:32;  Status DC


Insulin Detemir (Levemir) 55 units BID SQ  Last administered on 10/3/17at 22:21

;  Start 10/1/17 at 21:00;  Stop 10/4/17 at 07:54;  Status DC


Insulin Aspart (NovoLOG) 20 units TIDAC SQ  Last administered on 10/1/17at 18:35

;  Start 10/1/17 at 11:30;  Stop 10/2/17 at 08:37;  Status DC


Insulin Aspart (NovoLOG) 0-9 UNITS TIDWMEALS SQ  Last administered on 10/3/17at 

12:37;  Start 10/1/17 at 12:00;  Stop 10/4/17 at 07:54;  Status DC


Dextrose (Dextrose 50%-Water Syringe) 12.5 gm PRN Q15MIN  PRN IV SEE COMMENTS;  

Start 10/1/17 at 10:30


Diazepam (Valium) 5 mg 1X  ONCE PO  Last administered on 10/1/17at 11:52;  

Start 10/1/17 at 11:00;  Stop 10/1/17 at 11:01;  Status DC


Diazepam (Valium) 10 mg PRN Q6HRS  PRN PO ANXIETY Last administered on 10/3/

17at 15:31;  Start 10/1/17 at 12:30;  Stop 10/4/17 at 14:07;  Status DC


Sodium Chloride 1,000 ml @  1,000 mls/hr Q1H PRN IV hypotension;  Start 10/2/17 

at 07:51;  Stop 10/2/17 at 13:50;  Status DC


Info (PHARMACY MONITORING -- do not chart) 1 each PRN DAILY  PRN MC SEE COMMENTS

;  Start 10/2/17 at 08:00;  Status UNV


Insulin Aspart (NovoLOG) 15 units TIDAC SQ  Last administered on 10/3/17at 17:39

;  Start 10/2/17 at 11:30;  Stop 10/4/17 at 07:54;  Status DC


Ondansetron HCl (Zofran) 4 mg PRN Q6HRS  PRN IV NAUSEA/VOMITING;  Start 10/3/17 

at 07:00;  Stop 10/3/17 at 09:10;  Status DC


Fentanyl Citrate (Fentanyl 2ml Vial) 25 mcg PRN Q5MIN  PRN IV MILD PAIN;  Start 

10/3/17 at 07:00;  Stop 10/4/17 at 06:59;  Status Cancel


Fentanyl Citrate (Fentanyl 2ml Vial) 50 mcg PRN Q5MIN  PRN IV MODERATE PAIN;  

Start 10/3/17 at 07:00;  Stop 10/4/17 at 06:59;  Status Cancel


Morphine Sulfate 1 mg PRN Q10MIN  PRN IV SEVERE PAIN;  Start 10/3/17 at 07:00;  

Stop 10/4/17 at 06:59;  Status Cancel


Ringer's Solution 1,000 ml @  30 mls/hr Q24H IV ;  Start 10/3/17 at 07:00;  

Stop 10/3/17 at 18:59;  Status Cancel


Lidocaine HCl (Xylocaine-Mpf 1% Vial) 2 ml PRN 1X  PRN ID IV START;  Start 10/3/

17 at 07:00;  Stop 10/4/17 at 06:59;  Status Cancel


Hydromorphone HCl (Dilaudid) 0.5 mg PRN Q10MIN  PRN IV SEV PAIN, Second choice;

  Start 10/3/17 at 07:00;  Stop 10/4/17 at 06:59;  Status Cancel


Prochlorperazine Edisylate (Compazine) 5 mg PACU PRN  PRN IV NAUSEA, MRX1;  

Start 10/3/17 at 07:00;  Stop 10/4/17 at 06:59;  Status Cancel


Cefazolin Sodium 1 gm/Sodium Chloride 500 ml @  500 mls/hr 1X PERIOP  ONCE IRR  

Last administered on 10/3/17at 06:00;  Start 10/3/17 at 06:00;  Stop 10/3/17 at 

06:59;  Status DC


Heparin Sodium (Porcine) 12766 unit/Ringer's Solution 1,020 ml @  1,020 mls/hr 

1X PERIOP  ONCE IRR  Last administered on 10/3/17at 06:00;  Start 10/3/17 at 06:

00;  Stop 10/3/17 at 06:59;  Status DC


Potassium Chloride 70 meq/ Sodium Bicarbonate 12.5 meq/Lidocaine HCl 24 ml/

Parenteral Electrolytes 571.5 ml @  571.5 mls/ hr 1X PERIOP  ONCE IRR  Last 

administered on 10/3/17at 06:00;  Start 10/3/17 at 06:00;  Stop 10/3/17 at 06:59

;  Status DC


Potassium Chloride 15 meq/ Sodium Bicarbonate 12.5 meq/Parenteral Electrolytes 

520 ml @  520 mls/hr 1X PERIOP  ONCE IRR  Last administered on 10/3/17at 06:00;

  Start 10/3/17 at 06:00;  Stop 10/3/17 at 06:59;  Status DC


Midazolam HCl (Versed) 2 mg STK-MED ONCE .ROUTE ;  Start 10/2/17 at 13:27;  

Stop 10/2/17 at 13:28;  Status DC


Lidocaine HCl 20 ml STK-MED ONCE .ROUTE ;  Start 10/2/17 at 13:30;  Stop 10/2/

17 at 13:31;  Status DC


Heparin Sodium/ Sodium Chloride 500 ml @  As Directed STK-MED ONCE .ROUTE ;  

Start 10/2/17 at 13:30;  Stop 10/2/17 at 13:31;  Status DC


Heparin Sodium/ Sodium Chloride 1,000 unit 1X  ONCE IART  Last administered on 

10/2/17at 15:17;  Start 10/2/17 at 14:00;  Stop 10/2/17 at 14:01;  Status DC


Midazolam HCl (Versed) 2 mg 1X  ONCE IV  Last administered on 10/2/17at 15:23;  

Start 10/2/17 at 14:00;  Stop 10/2/17 at 14:01;  Status DC


Fentanyl Citrate (Fentanyl 2ml Vial) 100 mcg 1X  ONCE IV  Last administered on 

10/2/17at 15:24;  Start 10/2/17 at 14:00;  Stop 10/2/17 at 14:01;  Status DC


Lidocaine HCl 20 ml 1X  ONCE IJ  Last administered on 10/2/17at 15:16;  Start 10

/2/17 at 14:00;  Stop 10/2/17 at 14:01;  Status DC


Iohexol (Omnipaque 300 Mg/ml) 100 ml STK-MED ONCE .ROUTE ;  Start 10/2/17 at 14:

25;  Stop 10/2/17 at 14:26;  Status DC


Iohexol (Omnipaque 300 Mg/ml) 10 ml 1X  ONCE IART  Last administered on 10/2/

17at 15:17;  Start 10/2/17 at 14:45;  Stop 10/2/17 at 15:06;  Status DC


Info (Do NOT chart on this entry -- for MONITORING) 1 each PRN DAILY  PRN MC 

SEE COMMENTS;  Start 10/2/17 at 15:15;  Stop 10/4/17 at 15:14;  Status DC


Lidocaine HCl 20 ml STK-MED ONCE .ROUTE ;  Start 10/2/17 at 14:06;  Stop 10/2/

17 at 15:06;  Status DC


Cellulose 1 each STK-MED ONCE .ROUTE ;  Start 10/2/17 at 14:06;  Stop 10/2/17 

at 15:06;  Status DC


Protamine Sulfate 50 mg STK-MED ONCE IV ;  Start 10/2/17 at 14:06;  Stop 10/2/

17 at 15:06;  Status DC


Ondansetron HCl (Zofran) 4 mg PRN Q6HRS  PRN IV NAUSEA/VOMITING;  Start 10/2/17 

at 15:15;  Stop 10/3/17 at 15:14;  Status Cancel


Fentanyl Citrate (Fentanyl 2ml Vial) 25 mcg PRN Q5MIN  PRN IV MILD PAIN;  Start 

10/2/17 at 15:15;  Stop 10/3/17 at 15:14;  Status Cancel


Fentanyl Citrate (Fentanyl 2ml Vial) 50 mcg PRN Q5MIN  PRN IV MODERATE PAIN;  

Start 10/2/17 at 15:15;  Stop 10/3/17 at 15:14;  Status Cancel


Morphine Sulfate 1 mg PRN Q10MIN  PRN IV SEVERE PAIN;  Start 10/2/17 at 15:15;  

Stop 10/3/17 at 15:14;  Status Cancel


Ringer's Solution 1,000 ml @  30 mls/hr Q24H IV ;  Start 10/2/17 at 15:08;  

Stop 10/3/17 at 03:07;  Status DC


Lidocaine HCl (Xylocaine-Mpf 1% Vial) 2 ml 1X PRN  PRN ID IV START;  Start 10/2/

17 at 15:15;  Stop 10/3/17 at 15:14;  Status Cancel


Hydromorphone HCl (Dilaudid) 0.5 mg PRN Q10MIN  PRN IV SEV PAIN, Second choice;

  Start 10/2/17 at 15:15;  Stop 10/3/17 at 15:14;  Status Cancel


Prochlorperazine Edisylate (Compazine) 5 mg PACU PRN  PRN IV NAUSEA, MRX1;  

Start 10/2/17 at 15:15;  Stop 10/3/17 at 15:14;  Status Cancel


Heparin Sodium (Porcine) 5000 unit/Ringer's Solution 505 ml @  505 mls/hr 1X 

PERIOP  ONCE IRR  Last administered on 10/2/17at 16:44;  Start 10/2/17 at 15:30

;  Stop 10/2/17 at 16:29;  Status DC


Etomidate (Amidate) 20 mg STK-MED ONCE IV ;  Start 10/2/17 at 15:40;  Stop 10/2/

17 at 15:41;  Status DC


Rocuronium Bromide (Zemuron) 100 mg STK-MED ONCE .ROUTE ;  Start 10/2/17 at 15:

40;  Stop 10/2/17 at 15:41;  Status DC


Cefazolin Sodium/ Dextrose 50 ml @ As Directed STK-MED ONCE IV ;  Start 10/2/17 

at 16:12;  Stop 10/2/17 at 16:13;  Status DC


Lidocaine HCl (Lidocaine Pf 2% Vial) 5 ml STK-MED ONCE .ROUTE ;  Start 10/2/17 

at 16:14;  Stop 10/2/17 at 16:15;  Status DC


Heparin Sodium (Porcine) (Heparin Sodium) 10,000 unit STK-MED ONCE .ROUTE ;  

Start 10/2/17 at 16:30;  Stop 10/2/17 at 16:31;  Status DC


Desflurane (Suprane) 30 ml STK-MED ONCE IH ;  Start 10/2/17 at 16:46;  Stop 10/2

/17 at 16:47;  Status DC


Ondansetron HCl (Zofran) 4 mg STK-MED ONCE .ROUTE ;  Start 10/2/17 at 17:30;  

Stop 10/2/17 at 17:31;  Status DC


Neostigmine Methylsulfate (Bloxiverz) 10 mg STK-MED ONCE .ROUTE ;  Start 10/2/

17 at 17:30;  Stop 10/2/17 at 17:31;  Status DC


Glycopyrrolate (Robinul) 1 mg STK-MED ONCE .ROUTE ;  Start 10/2/17 at 17:30;  

Stop 10/2/17 at 17:31;  Status DC


Labetalol HCl (Normodyne) 10 mg 1X  ONCE IVP  Last administered on 10/2/17at 18:

31;  Start 10/2/17 at 18:30;  Stop 10/2/17 at 18:31;  Status DC


Labetalol HCl (Normodyne) 10 mg PRN Q4HRS  PRN IVP HYPERTENSION, SEE COMMENTS 

Last administered on 10/4/17at 04:59;  Start 10/2/17 at 18:45


Ondansetron HCl (Zofran) 4 mg PRN Q6HRS  PRN IV NAUSEA/VOMITING;  Start 10/4/17 

at 07:00;  Stop 10/5/17 at 06:59;  Status DC


Fentanyl Citrate (Fentanyl 2ml Vial) 25 mcg PRN Q5MIN  PRN IV MILD PAIN;  Start 

10/4/17 at 07:00;  Stop 10/5/17 at 06:59;  Status DC


Fentanyl Citrate (Fentanyl 2ml Vial) 50 mcg PRN Q5MIN  PRN IV MODERATE PAIN;  

Start 10/4/17 at 07:00;  Stop 10/5/17 at 06:59;  Status DC


Morphine Sulfate 1 mg PRN Q10MIN  PRN IV SEVERE PAIN;  Start 10/4/17 at 07:00;  

Stop 10/5/17 at 06:59;  Status DC


Ringer's Solution 1,000 ml @  30 mls/hr Q24H IV ;  Start 10/4/17 at 07:00;  

Stop 10/4/17 at 15:26;  Status DC


Lidocaine HCl (Xylocaine-Mpf 1% Vial) 2 ml PRN 1X  PRN ID IV START;  Start 10/4/

17 at 07:00;  Stop 10/5/17 at 06:59;  Status DC


Hydromorphone HCl (Dilaudid) 0.5 mg PRN Q10MIN  PRN IV SEV PAIN, Second choice;

  Start 10/4/17 at 07:00;  Stop 10/5/17 at 06:59;  Status DC


Prochlorperazine Edisylate (Compazine) 5 mg PACU PRN  PRN IV NAUSEA, MRX1;  

Start 10/4/17 at 07:00;  Stop 10/5/17 at 06:59;  Status DC


Cefazolin Sodium 1 gm/Sodium Chloride 500 ml @  500 mls/hr 1X PERIOP  ONCE IRR  

Last administered on 10/4/17at 08:51;  Start 10/4/17 at 06:00;  Stop 10/4/17 at 

06:59;  Status DC


Potassium Chloride 70 meq/ Sodium Bicarbonate 12.5 meq/Lidocaine HCl 24 ml/

Parenteral Electrolytes 571.5 ml @  571.5 mls/ hr 1X PERIOP  ONCE IRR  Last 

administered on 10/4/17at 06:00;  Start 10/4/17 at 06:00;  Stop 10/4/17 at 06:59

;  Status DC


Potassium Chloride 15 meq/ Sodium Bicarbonate 12.5 meq/Parenteral Electrolytes 

520 ml @  520 mls/hr 1X PERIOP  ONCE IRR  Last administered on 10/4/17at 06:00;

  Start 10/4/17 at 06:00;  Stop 10/4/17 at 06:59;  Status DC


Heparin Sodium (Porcine) 73184 unit/Ringer's Solution 1,020 ml @  1,020 mls/hr 

1X PERIOP  ONCE IRR  Last administered on 10/4/17at 08:51;  Start 10/4/17 at 06:

00;  Stop 10/4/17 at 06:59;  Status DC


Insulin Human Regular 150 unit/ Sodium Chloride 151.5 ml @  0 mls/hr CONT  PRN 

IV SEE I/O RECORD Last administered on 10/5/17at 04:40;  Start 10/4/17 at 00:00


Lidocaine HCl 20 ml STK-MED ONCE .ROUTE ;  Start 10/3/17 at 20:10;  Stop 10/3/

17 at 20:11;  Status DC


Heparin Sodium/ Sodium Chloride 500 ml @  As Directed STK-MED ONCE .ROUTE ;  

Start 10/3/17 at 20:10;  Stop 10/3/17 at 20:11;  Status DC


Midazolam HCl (Versed) 2 mg STK-MED ONCE .ROUTE ;  Start 10/3/17 at 20:32;  

Stop 10/3/17 at 20:33;  Status DC


Heparin Sodium/ Sodium Chloride 1,000 unit 1X  ONCE IART  Last administered on 

10/3/17at 20:55;  Start 10/3/17 at 20:45;  Stop 10/3/17 at 20:48;  Status DC


Midazolam HCl (Versed) 1 mg 1X  ONCE IV  Last administered on 10/3/17at 20:55;  

Start 10/3/17 at 20:45;  Stop 10/3/17 at 20:48;  Status DC


Fentanyl Citrate (Fentanyl 2ml Vial) 50 mcg 1X  ONCE IV ;  Start 10/3/17 at 20:

45;  Stop 10/3/17 at 20:48;  Status DC


Lidocaine HCl 20 ml 1X  ONCE IJ  Last administered on 10/3/17at 20:55;  Start 10

/3/17 at 20:45;  Stop 10/3/17 at 20:48;  Status DC


Etomidate (Amidate) 20 mg STK-MED ONCE IV ;  Start 10/4/17 at 06:32;  Stop 10/4/

17 at 06:33;  Status DC


Lidocaine HCl (Lidocaine Pf 2% Vial) 5 ml STK-MED ONCE .ROUTE ;  Start 10/4/17 

at 06:32;  Stop 10/4/17 at 06:33;  Status DC


Rocuronium Bromide (Zemuron) 100 mg STK-MED ONCE .ROUTE ;  Start 10/4/17 at 06:

32;  Stop 10/4/17 at 06:33;  Status DC


Ephedrine Sulfate (Akovaz) 50 mg STK-MED ONCE .ROUTE ;  Start 10/4/17 at 06:32;

  Stop 10/4/17 at 06:33;  Status DC


Phenylephrine HCl (Fabián-Synephrine Inj) 10 mg STK-MED ONCE .ROUTE ;  Start 10/4/

17 at 06:33;  Stop 10/4/17 at 06:34;  Status DC


Aminocaproic Acid (Amicar) 5,000 mg STK-MED ONCE IV ;  Start 10/4/17 at 06:33;  

Stop 10/4/17 at 06:34;  Status DC


Heparin Sodium (Porcine) 30,000 unit STK-MED ONCE .ROUTE ;  Start 10/4/17 at 06:

33;  Stop 10/4/17 at 06:34;  Status DC


Nitroglycerin/ Dextrose 250 ml @  As Directed STK-MED ONCE IV ;  Start 10/4/17 

at 06:34;  Stop 10/4/17 at 06:35;  Status DC


Midazolam HCl (Versed) 2 mg STK-MED ONCE .ROUTE ;  Start 10/4/17 at 06:35;  

Stop 10/4/17 at 06:36;  Status DC


Sufentanil Citrate (Sufenta) 100 mcg STK-MED ONCE .ROUTE ;  Start 10/4/17 at 06:

35;  Stop 10/4/17 at 06:36;  Status DC


Vancomycin HCl (Vanco) 10 gm STK-MED ONCE .ROUTE  Last administered on 10/4/

17at 08:51;  Start 10/4/17 at 06:22;  Stop 10/4/17 at 07:22;  Status DC


Cellulose 1 each STK-MED ONCE .ROUTE  Last administered on 10/4/17at 08:51;  

Start 10/4/17 at 06:22;  Stop 10/4/17 at 07:22;  Status DC


Papaverine HCl 60 mg STK-MED ONCE .ROUTE  Last administered on 10/4/17at 08:51;

  Start 10/4/17 at 06:22;  Stop 10/4/17 at 07:22;  Status DC


Aspirin (Aspirin) 300 mg STK-MED ONCE .ROUTE  Last administered on 10/4/17at 13:

20;  Start 10/4/17 at 06:22;  Stop 10/4/17 at 07:22;  Status DC


Sodium Chloride (Sodium Chloride) 50 ml STK-MED ONCE IJ  Last administered on 10

/4/17at 08:51;  Start 10/4/17 at 06:22;  Stop 10/4/17 at 07:23;  Status DC


Nicardipine HCl (Cardene) 25 mg STK-MED ONCE IV ;  Start 10/4/17 at 07:47;  

Stop 10/4/17 at 07:48;  Status DC


Isoflurane (Isoflurane) 90 ml STK-MED ONCE IH ;  Start 10/4/17 at 07:48;  Stop 

10/4/17 at 07:49;  Status DC


Heparin Sodium (Porcine) (Heparin Sodium) 10,000 unit STK-MED ONCE .ROUTE ;  

Start 10/4/17 at 08:43;  Stop 10/4/17 at 08:44;  Status DC


Heparin Sodium (Porcine) (Heparin Sodium) 10,000 unit STK-MED ONCE .ROUTE ;  

Start 10/4/17 at 08:46;  Stop 10/4/17 at 08:47;  Status DC


Rocuronium Bromide (Zemuron) 100 mg STK-MED ONCE .ROUTE ;  Start 10/4/17 at 08:

48;  Stop 10/4/17 at 08:49;  Status DC


Rocuronium Bromide (Zemuron) 100 mg STK-MED ONCE .ROUTE ;  Start 10/4/17 at 08:

48;  Stop 10/4/17 at 08:49;  Status DC


Rocuronium Bromide (Zemuron) 100 mg STK-MED ONCE .ROUTE ;  Start 10/4/17 at 08:

49;  Stop 10/4/17 at 08:50;  Status DC


Protamine Sulfate 50 mg STK-MED ONCE IV ;  Start 10/4/17 at 09:52;  Stop 10/4/

17 at 09:53;  Status DC


Protamine Sulfate 250 mg STK-MED ONCE IV ;  Start 10/4/17 at 09:52;  Stop 10/4/

17 at 09:53;  Status DC


Heparin Sodium (Porcine) 37240 unit/Sodium Chloride 1,020 ml @  1,020 mls/hr 1X

  ONCE IV ;  Start 10/4/17 at 10:30;  Stop 10/4/17 at 10:30;  Status DC


Dexamethasone Sodium Phosphate (Decadron) 20 mg STK-MED ONCE .ROUTE ;  Start 10/

4/17 at 10:20;  Stop 10/4/17 at 10:21;  Status DC


Heparin Sodium (Porcine) 85854 unit/Sodium Chloride 1,020 ml @  1,020 mls/hr 1X

  ONCE IRR  Last administered on 10/4/17at 10:30;  Start 10/4/17 at 10:30;  

Stop 10/4/17 at 11:29;  Status DC


Midazolam HCl (Versed) 2 mg STK-MED ONCE .ROUTE ;  Start 10/4/17 at 10:48;  

Stop 10/4/17 at 10:49;  Status DC


Dextrose (Dextrose 50%-Water Syringe) 25 gm STK-MED ONCE IV ;  Start 10/4/17 at 

12:18;  Stop 10/4/17 at 12:19;  Status DC


Sufentanil Citrate (Sufenta) 100 mcg STK-MED ONCE .ROUTE ;  Start 10/4/17 at 12:

24;  Stop 10/4/17 at 12:25;  Status DC


Heparin Sodium (Porcine) (Heparin Sodium) 10,000 unit STK-MED ONCE .ROUTE ;  

Start 10/4/17 at 12:37;  Stop 10/4/17 at 12:38;  Status DC


Heparin Sodium (Porcine) 30,000 unit STK-MED ONCE .ROUTE ;  Start 10/4/17 at 12:

37;  Stop 10/4/17 at 12:38;  Status DC


Calcium Chloride 1,000 mg STK-MED ONCE IV ;  Start 10/4/17 at 12:38;  Stop 10/4/

17 at 12:39;  Status DC


Magnesium Sulfate 5 gm STK-MED ONCE .ROUTE ;  Start 10/4/17 at 12:38;  Stop 10/4

/17 at 12:39;  Status DC


Mannitol (Mannitol) 12.5 g STK-MED ONCE .ROUTE ;  Start 10/4/17 at 12:38;  Stop 

10/4/17 at 12:39;  Status DC


Lidocaine HCl (Lidocaine Pf 2% Vial) 5 ml STK-MED ONCE .ROUTE ;  Start 10/4/17 

at 12:38;  Stop 10/4/17 at 12:39;  Status DC


Albumin Human 100 ml @  As Directed STK-MED ONCE IV ;  Start 10/4/17 at 12:39;  

Stop 10/4/17 at 12:40;  Status DC


Sodium Bicarbonate 50 meq STK-MED ONCE .ROUTE ;  Start 10/4/17 at 12:39;  Stop 

10/4/17 at 12:40;  Status DC


Nicardipine HCl 50 mg/Sodium Chloride 270 ml @ 0 mls/hr CONT  PRN IV SEE I/O 

RECORD Last administered on 10/5/17at 08:22;  Start 10/4/17 at 13:30


Sodium Chloride (Normal Saline Flush) 3 ml PRN Q12HR  PRN IV AFTER MEDS AND 

BLOOD DRAWS;  Start 10/4/17 at 13:45


Ringer's Solution 1,000 ml @  30 mls/hr Q24H IV  Last administered on 10/4/17at 

14:13;  Start 10/4/17 at 13:34


Albumin Human 250 ml @  60 mls/hr PRN Q4HRS  PRN IV SEE I/O RECORD Last 

administered on 10/4/17at 18:25;  Start 10/4/17 at 13:45


Insulin Human Regular 150 unit/ Sodium Chloride 151.5 ml @  0 mls/hr CONT PRN  

PRN IV SEE I/O RECORD;  Start 10/4/17 at 13:45;  Stop 10/4/17 at 15:26;  Status 

DC


Dextrose (Dextrose 50%-Water Syringe) 25 gm PRN Q15MIN  PRN IV LOW BLOOD SUGAR;

  Start 10/4/17 at 13:45


Amiodarone HCl 150 mg/Dextrose 103 ml @  200 mls/hr ONCE  ONCE IV  Last 

administered on 10/4/17at 15:46;  Start 10/4/17 at 13:45;  Stop 10/4/17 at 14:15

;  Status DC


Amiodarone HCl 900 mg/Dextrose 518 ml @  33.33 mls/ hr CONT  PRN IV SEE I/O 

RECORD Last administered on 10/4/17at 15:46;  Start 10/4/17 at 13:45


Morphine Sulfate 2 mg PRN Q1HR  PRN IV PAIN Last administered on 10/4/17at 22:41

;  Start 10/4/17 at 13:45;  Stop 10/5/17 at 02:10;  Status DC


Acetaminophen (Tylenol) 650 mg PRN Q4HRS  PRN PO MILD PAIN / TEMP;  Start 10/4/

17 at 13:45


Acetaminophen (Acetaminophen Supp) 650 mg PRN Q4HRS  PRN NE MILD PAIN / TEMP;  

Start 10/4/17 at 13:45


Meperidine HCl (Demerol) 12.5 mg PRN Q15MIN  PRN IV SHIVERING;  Start 10/4/17 

at 13:45;  Stop 10/5/17 at 13:34;  Status DC


Propofol 100 ml @ 0 mls/hr CONT PRN  PRN IV POSTOP SEDATION UNTIL EXTUBATE;  

Start 10/4/17 at 13:45;  Stop 10/4/17 at 18:25;  Status DC


Senna/Docusate Sodium (Senna Plus) 1 tab BID PO  Last administered on 10/5/17at 

12:11;  Start 10/4/17 at 21:00


Bisacodyl (Dulcolax Supp) 10 mg PRN DAILY  PRN NE NO BOWEL MOVEMENT;  Start 10/4

/17 at 13:45


Aspirin (Ecotrin) 325 mg DAILYWBKFT PO  Last administered on 10/5/17at 12:12;  

Start 10/5/17 at 08:00


Aspirin (Aspirin) 300 mg PRN DAILY  PRN NE IF UNABLE TO TAKE PO;  Start 10/4/17 

at 13:45


Albuterol Sulfate (Ventolin Neb Soln) 2.5 mg PRN Q4HRS  PRN NEB SHORTNESS OF 

BREATH;  Start 10/4/17 at 13:45


Metoprolol Tartrate (Lopressor) 25 mg BID PO ;  Start 10/5/17 at 09:00;  Stop 10

/5/17 at 09:00;  Status DC


Nicardipine HCl 50 mg/Sodium Chloride 270 ml @ 0 mls/hr CONT PRN  PRN IV PER 

PROTOCOL;  Start 10/4/17 at 13:45;  Stop 10/4/17 at 15:26;  Status DC


Oxycodone/ Acetaminophen (Percocet 5/325) 1 tab PRN Q4HRS  PRN PO MILD PAIN;  

Start 10/4/17 at 13:45


Oxycodone/ Acetaminophen (Percocet 5/325) 2 tab PRN Q4HRS  PRN PO MODERATE PAIN

, SEVERE PAIN Last administered on 10/5/17at 16:50;  Start 10/4/17 at 13:45


Cefazolin Sodium/ Dextrose 50 ml @  100 mls/hr Q24H IV  Last administered on 10/

5/17at 12:00;  Start 10/5/17 at 08:00;  Stop 10/6/17 at 08:29


Famotidine (Pepcid) 20 mg QHS IVP  Last administered on 10/4/17at 20:39;  Start 

10/4/17 at 21:00


Polyethylene Glycol (miraLAX PACKET) 17 gm HS PO ;  Start 10/5/17 at 21:00


Nitroglycerin/ Dextrose 250 ml @ 0 mls/hr CONT  PRN IV SEE I/O RECORD Last 

administered on 10/5/17at 06:51;  Start 10/4/17 at 15:15


Sodium Bicarbonate 50 meq 1X  ONCE IV  Last administered on 10/4/17at 15:40;  

Start 10/4/17 at 15:30;  Stop 10/4/17 at 15:31;  Status DC


Sodium Bicarbonate (Sodium Bicarbonate) 650 mg TIDWMEALS PO  Last administered 

on 10/5/17at 12:11;  Start 10/5/17 at 08:00


Sodium Bicarbonate 50 meq 1X  ONCE IV  Last administered on 10/4/17at 18:40;  

Start 10/4/17 at 19:00;  Stop 10/4/17 at 19:01;  Status DC


Metoclopramide HCl (Reglan) 10 mg 1X  ONCE IV ;  Start 10/4/17 at 19:15;  Stop 

10/4/17 at 19:15;  Status DC


Citric Acid/ Sodium Citrate (Bicitra) 30 ml 1X  ONCE PO ;  Start 10/4/17 at 19:

15;  Stop 10/4/17 at 19:15;  Status DC


Morphine Sulfate 2 mg PRN Q1HR  PRN IV PAIN Last administered on 10/5/17at 12:10

;  Start 10/5/17 at 02:15


Metoprolol Tartrate (Lopressor) 50 mg BID PO  Last administered on 10/5/17at 12:

12;  Start 10/5/17 at 09:00


Sodium Chloride 1,000 ml @  1,000 mls/hr Q1H PRN IV hypotension;  Start 10/5/17 

at 07:55;  Stop 10/5/17 at 13:54;  Status DC


Diphenhydramine HCl (Benadryl) 25 mg 1X PRN  PRN IV ITCHING;  Start 10/5/17 at 

08:00;  Stop 10/6/17 at 07:59


Diphenhydramine HCl (Benadryl) 25 mg 1X PRN  PRN IV ITCHING;  Start 10/5/17 at 

08:00;  Stop 10/6/17 at 07:59


Sodium Chloride 1,000 ml @  400 mls/hr Q2H30M PRN IV PATENCY;  Start 10/5/17 at 

07:55;  Stop 10/5/17 at 19:54


Info (PHARMACY MONITORING -- do not chart) 1 each PRN DAILY  PRN MC SEE COMMENTS

;  Start 10/5/17 at 08:00;  Status UNV


Info (PHARMACY MONITORING -- do not chart) 1 each PRN DAILY  PRN MC SEE COMMENTS

;  Start 10/5/17 at 08:00;  Status UNV


Ondansetron HCl (Zofran) 4 mg STK-MED ONCE .ROUTE ;  Start 10/5/17 at 12:08;  

Stop 10/5/17 at 12:09;  Status DC


Amiodarone HCl (Cordarone) 200 mg BID PO ;  Start 10/5/17 at 21:00


Insulin Detemir (Levemir) 40 units QHS SQ ;  Start 10/5/17 at 21:00


Insulin Aspart (NovoLOG) 0-9 UNITS TIDWMEALS SQ ;  Start 10/5/17 at 17:00


Dextrose (Dextrose 50%-Water Syringe) 12.5 gm PRN Q15MIN  PRN IV SEE COMMENTS;  

Start 10/5/17 at 15:30





Active Scripts


Active


Sodium Bicarbonate 650 Mg Tablet 650 Mg PO TID


Cozaar (Losartan Potassium) 50 Mg Tablet 100 Mg PO DAILY08


Gabapentin 300 Mg Capsule 300 Mg PO DAILY


Reported


Isosorbide Mononitrate 20 Mg Tablet 60 Mg PO DAILY


Clopidogrel (Clopidogrel Bisulfate) 75 Mg Tablet 1 Tab PO DAILY


Aspirin 325 Mg Tablet 1 Tab PO DAILY


Ranexa (Ranolazine) 500 Mg Tab.er.12h 1 Tab PO BID


Humulin N Kwikpen (Nph, Human Insulin Isophane) 100 Unit/1 Ml Insuln.pen 40 

Unit SQ BIDAC


Percocet  Mg Tablet (Oxycodone/Acetaminophen) 1 Each Tablet 1 Tab PO QID


Amlodipine Besylate 10 Mg Tablet 10 Mg PO DAILY


Ventolin Hfa Inhaler (Albuterol Sulfate) 18 Gm Hfa.aer.ad 2 Puff INH Q4HRS PRN


Carvedilol 25 Mg Tablet 25 Mg PO BID


Vitals/I & O





Vital Sign - Last 24 Hours








 10/4/17 10/4/17 10/4/17 10/4/17





 18:49 19:00 19:00 19:30


 


Temp  99.4  





  99.4  


 


Pulse  96 96 


 


Resp 22 14  


 


B/P (MAP)  166/93 (117)  


 


Pulse Ox 99 99  


 


O2 Delivery Nasal Cannula Nasal Cannula  Nasal Cannula


 


O2 Flow Rate 4.0 4.0  4.0


 


    





    





 10/4/17 10/4/17 10/4/17 10/4/17





 19:40 19:44 20:00 20:00


 


Temp   99.4 





   99.4 


 


Pulse   99 99


 


Resp 16  16 


 


B/P (MAP)   144/61 (88) 


 


Pulse Ox 100 100 99 


 


O2 Delivery Nasal Cannula Nasal Cannula Nasal Cannula 


 


O2 Flow Rate 4.0 4.0 4.0 


 


    





    





 10/4/17 10/4/17 10/4/17 10/4/17





 20:02 20:10 20:10 20:37


 


Temp   99.5 





   99.5 


 


Pulse   100 


 


Resp 18 14 16 16


 


B/P (MAP)   130/57 


 


Pulse Ox 99 99  100


 


O2 Delivery Nasal Cannula Nasal Cannula  Nasal Cannula


 


O2 Flow Rate 3.0 3.0  3.0


 


    





    





 10/4/17 10/4/17 10/4/17 10/4/17





 21:00 21:00 22:00 22:00


 


Temp 99.5  99.5 





 99.5  99.5 


 


Pulse 93 93 95 95


 


Resp 18  14 


 


B/P (MAP) 120/60 (80)  139/62 (87) 


 


Pulse Ox 100  98 


 


O2 Delivery Nasal Cannula  Nasal Cannula 


 


O2 Flow Rate 3.0  3.0 


 


    





    





 10/4/17 10/4/17 10/4/17 10/4/17





 22:30 22:41 23:00 23:00


 


Temp 99.4   99.5





 99.4   99.5


 


Pulse 97  95 95


 


Resp 16 16  20


 


B/P (MAP) 148/67   154/67 (96)


 


Pulse Ox  100  100


 


O2 Delivery  Nasal Cannula  Nasal Cannula


 


O2 Flow Rate  3.0  3.0


 


    





    





 10/4/17 10/5/17 10/5/17 10/5/17





 23:10 00:00 00:00 00:00


 


Temp   99.6 





   99.6 


 


Pulse   100 100


 


Resp 20  17 


 


B/P (MAP)   150/65 (93) 


 


Pulse Ox 100  98 


 


O2 Delivery Nasal Cannula Nasal Cannula Nasal Cannula 


 


O2 Flow Rate 3.0 3.0 3.0 


 


    





    





 10/5/17 10/5/17 10/5/17 10/5/17





 00:47 01:00 01:00 02:00


 


Temp  99.8  





  99.8  


 


Pulse  99 100 102


 


Resp 15 17  


 


B/P (MAP)  152/66 (94)  


 


Pulse Ox 98 99  


 


O2 Delivery Nasal Cannula Nasal Cannula  


 


O2 Flow Rate  3.0  


 


    





    





 10/5/17 10/5/17 10/5/17 10/5/17





 02:00 02:17 02:47 03:00


 


Temp 99.9   100.0





 99.9   100.0


 


Pulse 102   98


 


Resp 14 20 14 24


 


B/P (MAP) 140/65 (90)   141/63 (89)


 


Pulse Ox 97 98  100


 


O2 Delivery Nasal Cannula Nasal Cannula  Nasal Cannula


 


O2 Flow Rate 3.0 3.0  3.0


 


    





    





 10/5/17 10/5/17 10/5/17 10/5/17





 03:00 04:00 04:00 04:00


 


Temp  100.0  





  100.0  


 


Pulse 98 97 97 


 


Resp  22  


 


B/P (MAP)  136/60 (85)  


 


Pulse Ox  100  


 


O2 Delivery  Nasal Cannula  Nasal Cannula


 


O2 Flow Rate  3.0  3.0


 


    





    





 10/5/17 10/5/17 10/5/17 10/5/17





 04:38 05:00 05:00 05:40


 


Temp  100.0  





  100.0  


 


Pulse  104 104 


 


Resp 24 21  20


 


B/P (MAP)  148/60 (89)  


 


Pulse Ox 99 98  98


 


O2 Delivery Nasal Cannula Nasal Cannula  Nasal Cannula


 


O2 Flow Rate 3.0 3.0  3.0


 


    





    





 10/5/17 10/5/17 10/5/17 10/5/17





 06:00 06:00 06:33 07:00


 


Temp  100.0  





  100.0  


 


Pulse  101  


 


Resp  8 18 


 


B/P (MAP)  148/62 (90)  


 


Pulse Ox  99 97 


 


O2 Delivery  Nasal Cannula Nasal Cannula 


 


O2 Flow Rate  3.0 3.0 


 


    





    





 10/5/17 10/5/17 10/5/17 10/5/17





 07:00 07:03 08:00 08:00


 


Temp    99.4





    99.4


 


Pulse 102   102


 


Resp 12   20


 


B/P (MAP) 152/64 (93)   150/62 (91)


 


Pulse Ox 98 98  98


 


O2 Delivery Nasal Cannula Nasal Cannula Nasal Cannula Nasal Cannula


 


O2 Flow Rate 3.0 3.0 3.0 3.0


 


    





    





 10/5/17 10/5/17 10/5/17 10/5/17





 08:23 09:00 10:00 11:00


 


Pulse  94 108 108


 


Resp 16 22 23 12


 


B/P (MAP)  126/64 (84) 130/60 (83) 144/60 (88)


 


Pulse Ox  98 98 98


 


O2 Delivery Nasal Cannula Nasal Cannula Nasal Cannula Nasal Cannula


 


O2 Flow Rate 3.0 3.0 3.0 3.0





 10/5/17 10/5/17 10/5/17 10/5/17





 12:00 12:00 12:12 13:00


 


Temp  98.9  





  98.9  


 


Pulse  106 102 108


 


Resp  24  30


 


B/P (MAP)  140/64 (89) 152/64 150/62 (91)


 


Pulse Ox  98  98


 


O2 Delivery Nasal Cannula Nasal Cannula  Nasal Cannula


 


O2 Flow Rate 3.0 3.0  3.0


 


    





    





 10/5/17 10/5/17 10/5/17 10/5/17





 14:00 15:00 16:00 16:00


 


Temp    98.9





    98.9


 


Pulse 82 82  88


 


Resp 26 28  32


 


B/P (MAP) 115/58 (77) 122/55 (77)  108/57 (74)


 


Pulse Ox 98 98  98


 


O2 Delivery Nasal Cannula Nasal Cannula Nasal Cannula Nasal Cannula


 


O2 Flow Rate 3.0 3.0 3.0 3.0


 


    





    





 10/5/17   





 17:00   


 


Pulse 84   


 


Resp 27   


 


B/P (MAP) 104/62 (76)   


 


Pulse Ox 98   


 


O2 Delivery Nasal Cannula   


 


O2 Flow Rate 3.0   














Intake and Output   


 


 10/5/17 10/5/17 10/6/17





 15:00 23:00 07:00


 


Intake Total 240 ml  


 


Output Total 212 ml  


 


Balance 28 ml  

















DAMIAN ONEAL MD Oct 5, 2017 18:33

## 2017-10-05 NOTE — PDOC
Provider Note


Provider Note


off vent, on dialysis, sl;eeping- glucose good on drip, labs ok- cont same











ARIC MATUTE MD Oct 5, 2017 08:55

## 2017-10-05 NOTE — PDOC
Progress Note


Subjective


Subjective


Doing very well. Normotensive and in sinus rhythm. On 3 L nasal cannula. Had 

dialysis today with removal of 1 L of fluid. Minimal mediastinal and chest tube 

output. Complains of some incisional pain is adequately controlled with 

analgesia. Chest x-ray looks excellent.





ROS


ROS


No nausea


No vomiting


No pain


No rash





Vital Sign


Vital Signs





Vital Signs








  Date Time  Temp Pulse Resp B/P (MAP) Pulse Ox O2 Delivery O2 Flow Rate FiO2


 


10/5/17 12:12  102  152/64    


 


10/5/17 12:00      Nasal Cannula 3.0 


 


10/5/17 08:23   16     


 


10/5/17 07:03     98   


 


10/5/17 06:00 100.0       





 100.0       











Physical Exam


PHYSICAL EXAM


GENERAL:  NAD, Alert


HEENT:  PERRL, OC/OP


NECK:  Supple, no JVD, no LN


LUNGS:  Clear


HEART:  S1S2, no gallop, no murmur


ABD:  Soft, NT, no organomegaly, no rebound


EXT:  No edema, no cyanosis


CNS:  Alert, oriented x 3, no focal neurologic deficit


SKIN:  No rash


IV: ok





Labs


Lab





Laboratory Tests








Test


  10/4/17


16:15 10/4/17


17:06 10/4/17


18:00 10/4/17


18:06


 


Glucose (Fingerstick)


  187 mg/dL


(70-99) 151 mg/dL


(70-99) 


  148 mg/dL


(70-99)


 


White Blood Count


  


  


  11.6 x10^3/uL


(4.0-11.0) 


 


 


Red Blood Count


  


  


  2.82 x10^6/uL


(4.30-5.70) 


 


 


Hemoglobin


  


  


  7.8 g/dL


(13.0-17.5) 


 


 


Hematocrit


  


  


  23.8 %


(39.0-53.0) 


 


 


Mean Corpuscular Volume   84 fL ()  


 


Mean Corpuscular Hemoglobin   28 pg (25-35)  


 


Mean Corpuscular Hemoglobin


Concent 


  


  33 g/dL


(31-37) 


 


 


Red Cell Distribution Width


  


  


  14.1 %


(11.5-14.5) 


 


 


Platelet Count


  


  


  197 x10^3/uL


(140-400) 


 


 


Potassium Level


  


  


  4.7 mmol/L


(3.5-5.1) 


 


 


Test


  10/4/17


18:25 10/4/17


19:19 10/4/17


20:19 10/4/17


21:18


 


O2 Saturation 98 % (92-99)    


 


Arterial Blood pH


  7.40


(7.35-7.45) 


  


  


 


 


Arterial Blood pCO2 at


Patient Temp 36 mmHg


(35-46) 


  


  


 


 


Arterial Blood pO2 at Patient


Temp 148 mmHg


() 


  


  


 


 


Arterial Blood HCO3


  21 mmol/L


(21-28) 


  


  


 


 


Arterial Blood Base Excess


  -3 mmol/L


(-3-3) 


  


  


 


 


FiO2 40    


 


Glucose (Fingerstick)


  


  138 mg/dL


(70-99) 157 mg/dL


(70-99) 159 mg/dL


(70-99)


 


Test


  10/4/17


23:32 10/5/17


00:21 10/5/17


01:22 10/5/17


02:18


 


Glucose (Fingerstick)


  126 mg/dL


(70-99) 124 mg/dL


(70-99) 121 mg/dL


(70-99) 135 mg/dL


(70-99)


 


Test


  10/5/17


03:18 10/5/17


04:18 10/5/17


05:20 10/5/17


05:21


 


Glucose (Fingerstick)


  151 mg/dL


(70-99) 160 mg/dL


(70-99) 


  167 mg/dL


(70-99)


 


White Blood Count


  


  


  13.2 x10^3/uL


(4.0-11.0) 


 


 


Red Blood Count


  


  


  2.83 x10^6/uL


(4.30-5.70) 


 


 


Hemoglobin


  


  


  8.0 g/dL


(13.0-17.5) 


 


 


Hematocrit


  


  


  24.1 %


(39.0-53.0) 


 


 


Mean Corpuscular Volume   85 fL ()  


 


Mean Corpuscular Hemoglobin   28 pg (25-35)  


 


Mean Corpuscular Hemoglobin


Concent 


  


  33 g/dL


(31-37) 


 


 


Red Cell Distribution Width


  


  


  14.0 %


(11.5-14.5) 


 


 


Platelet Count


  


  


  187 x10^3/uL


(140-400) 


 


 


Sodium Level


  


  


  141 mmol/L


(136-145) 


 


 


Potassium Level


  


  


  4.9 mmol/L


(3.5-5.1) 


 


 


Chloride Level


  


  


  105 mmol/L


() 


 


 


Carbon Dioxide Level


  


  


  24 mmol/L


(21-32) 


 


 


Anion Gap   12 (6-14)  


 


Blood Urea Nitrogen


  


  


  60 mg/dL


(8-26) 


 


 


Creatinine


  


  


  4.9 mg/dL


(0.7-1.3) 


 


 


Estimated GFR


(Cockcroft-Gault) 


  


  12.5 


  


 


 


Glucose Level


  


  


  179 mg/dL


(70-99) 


 


 


Calcium Level


  


  


  8.8 mg/dL


(8.5-10.1) 


 


 


Magnesium Level


  


  


  2.8 mg/dL


(1.8-2.4) 


 


 


Test


  10/5/17


06:25 10/5/17


07:31 10/5/17


08:40 10/5/17


09:47


 


Glucose (Fingerstick)


  170 mg/dL


(70-99) 159 mg/dL


(70-99) 123 mg/dL


(70-99) 120 mg/dL


(70-99)


 


Test


  10/5/17


10:51 10/5/17


11:59 10/5/17


13:06 10/5/17


14:04


 


Glucose (Fingerstick)


  121 mg/dL


(70-99) 149 mg/dL


(70-99) 214 mg/dL


(70-99) 210 mg/dL


(70-99)


 


Test


  10/5/17


15:13 


  


  


 


 


Glucose (Fingerstick)


  244 mg/dL


(70-99) 


  


  


 











Objective


Assessment


POD#1, s/p CABG x 3 (LIMA to LAD, SVG to RPDA, SVG to OM). 


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





Doing very well. Normotensive and in sinus rhythm. On 3 L nasal cannula. Had 

dialysis today with removal of 1 L of fluid. Minimal mediastinal and chest tube 

output. Complains of some incisional pain is adequately controlled with 

analgesia. Chest x-ray looks excellent.





Plan


Plan of Care


D/c swan-naz and arterial line


D/c mediastinal drains


Stop insulin drip and switch to Levemir 40 units daily with sliding scale


Stop amiodarone drip and switch to 200 mg po amiodarone BID


ASA, b blocker and statin


PT consult. Will need assistance to ambulate


I/S


If needed, would add home dose Losartan 50mg daily for BP control











MATTIE RAIN MD Oct 5, 2017 16:09

## 2017-10-05 NOTE — PDOC
Renal-Progress Notes


Subjective Notes


Notes


HAVING SOME INCISIONAL PAIN





History of Present Illness


Hx of present illness


STABLE





Vitals


Vitals





Vital Signs








  Date Time  Temp Pulse Resp B/P (MAP) Pulse Ox O2 Delivery O2 Flow Rate FiO2


 


10/5/17 08:23   16   Nasal Cannula 3.0 


 


10/5/17 07:03     98   


 


10/5/17 07:00  102  152/64 (93)    


 


10/5/17 06:00 100.0       





 100.0       








Weight


Weight [ ]





I.O.


Intake and Output











Intake and Output 


 


 10/6/17





 07:00


 


Intake Total 240 ml


 


Output Total 212 ml


 


Balance 28 ml


 


 


 


Intake Oral 240 ml


 


Output Urine Total 172 ml


 


Chest Tube Drainage Total 40 ml











Labs


Labs





Laboratory Tests








Test


  10/4/17


11:41 10/4/17


11:44 10/4/17


11:55 10/4/17


12:10


 


Bedside Hemoglobin


(Calculated) 7.5 g/dL


(14-18) 


  


  7.1 g/dL


(14-18)


 


Bedside Hematocrit 22 % (37-52)   23 % (37-52)  21 % (37-52) 


 


Bedside Arterial pH


  7.39


(7.35-7.45) 


  


  7.35


(7.35-7.45)


 


Bedside Arterial pCO2


  42 mmHg


(35-45) 


  


  44 mmHg


(35-45)


 


Bedside Arterial pO2


  254 mmHg


() 


  


  305 mmHg


()


 


Bedside Arterial HCO3


  25 mmol/L


(21-28) 


  


  24 mmol/L


(21-28)


 


Bedside Arterial Total CO2


  27 mmol/L


(21-32) 


  


  25 mmol/L


(21-32)


 


Arterial Bld O2 Saturation


(Measur) 100 % (95-99) 


  


  


  100 % (95-99) 


 


 


Bedside Arterial Blood Base


Excess 0 mmol/L (0-3) 


  


  


  -1 mmol/L


(0-3)


 


Bedside FiO2 75.0   75  80.0 


 


Bedside Sodium


  137 mmol/L


(135-145) 


  137 mmol/L


(135-145) 135 mmol/L


(135-145)


 


Bedside Potassium


  6.0 mmol/L


(3.5-5.0) 


  5.9 mmol/L


(3.5-5.0) 6.0 mmol/L


(3.5-5.0)


 


Glucose Level


  163 mg/dL


(70-99) 


  169 mg/dL


(70-99) 164 mg/dL


(70-99)


 


Bedside Ionized Calcium (Dave)


  1.04 mmol/L


(1.13-1.32) 


  1.06 mmol/L


(1.13-1.32) 1.02 mmol/L


(1.13-1.32)


 


Activated Clotting Time


  


  494 SEC


() 


  


 


 


Bedside Venous pH


  


  


  7.35


(7.32-7.42) 


 


 


Bedside Venous pCO2


  


  


  41 mmHg


(41-51) 


 


 


Bedside Venous pO2


  


  


  38 mmHg


(20-40) 


 


 


Bedside Venous HCO3


  


  


  23 mmol/L


(24-28) 


 


 


Bedside Venous Blood Total CO2


  


  


  24 mmol/L


(21-32) 


 


 


Bedside Venous Blood O2


Saturation 


  


  69 % 


  


 


 


Bedside Venous Blood Base


Excess 


  


  -3 mmol/L


(0-3) 


 


 


POC Venous Hemoglobin (Calc)


  


  


  7.8 g/dL


(14-18) 


 


 


Test


  10/4/17


12:14 10/4/17


12:35 10/4/17


12:39 10/4/17


13:05


 


Activated Clotting Time


  492 SEC


() 


  99 SEC


() 


 


 


Bedside Hemoglobin


(Calculated) 


  7.5 g/dL


(14-18) 


  8.5 g/dL


(14-18)


 


Bedside Hematocrit  22 % (37-52)   25 % (37-52) 


 


Bedside Arterial pH


  


  7.37


(7.35-7.45) 


  7.36


(7.35-7.45)


 


Bedside Arterial pCO2


  


  42 mmHg


(35-45) 


  42 mmHg


(35-45)


 


Bedside Arterial pO2


  


  441 mmHg


() 


  488 mmHg


()


 


Bedside Arterial HCO3


  


  24 mmol/L


(21-28) 


  24 mmol/L


(21-28)


 


Bedside Arterial Total CO2


  


  25 mmol/L


(21-32) 


  25 mmol/L


(21-32)


 


Arterial Bld O2 Saturation


(Measur) 


  100 % (95-99) 


  


  100 % (95-99) 


 


 


Bedside Arterial Blood Base


Excess 


  -1 mmol/L


(0-3) 


  -2 mmol/L


(0-3)


 


Bedside FiO2  100.0   100.0 


 


Bedside Sodium


  


  135 mmol/L


(135-145) 


  138 mmol/L


(135-145)


 


Bedside Potassium


  


  5.5 mmol/L


(3.5-5.0) 


  4.8 mmol/L


(3.5-5.0)


 


Glucose Level


  


  221 mg/dL


(70-99) 


  191 mg/dL


(70-99)


 


Bedside Ionized Calcium (Dave)


  


  1.52 mmol/L


(1.13-1.32) 


  1.36 mmol/L


(1.13-1.32)


 


Test


  10/4/17


13:10 10/4/17


14:10 10/4/17


14:12 10/4/17


15:11


 


White Blood Count


  12.5 x10^3/uL


(4.0-11.0) 16.3 x10^3/uL


(4.0-11.0) 


  


 


 


Hemoglobin


  8.2 g/dL


(13.0-17.5) 9.2 g/dL


(13.0-17.5) 


  


 


 


Hematocrit


  24.4 %


(39.0-53.0) 27.5 %


(39.0-53.0) 


  


 


 


Platelet Count


  155 x10^3/uL


(140-400) 204 x10^3/uL


(140-400) 


  


 


 


Prothrombin Time


  17.6 SEC


(11.7-14.0) 16.2 SEC


(11.7-14.0) 


  


 


 


Prothromb Time International


Ratio 1.5 (0.8-1.1) 


  1.4 (0.8-1.1) 


  


  


 


 


Activated Partial


Thromboplast Time 42 SEC (24-38) 


  42 SEC (24-38) 


  


  


 


 


Fibrinogen


  394 mg/dL


(200-440) 


  


  


 


 


Red Blood Count


  


  3.24 x10^6/uL


(4.30-5.70) 


  


 


 


Mean Corpuscular Volume  85 fL ()   


 


Mean Corpuscular Hemoglobin  29 pg (25-35)   


 


Mean Corpuscular Hemoglobin


Concent 


  34 g/dL


(31-37) 


  


 


 


Red Cell Distribution Width


  


  14.3 %


(11.5-14.5) 


  


 


 


O2 Saturation  99 % (92-99)   


 


Arterial Blood pH


  


  7.36


(7.35-7.45) 


  


 


 


Arterial Blood pCO2 at


Patient Temp 


  37 mmHg


(35-46) 


  


 


 


Arterial Blood pO2 at Patient


Temp 


  228 mmHg


() 


  


 


 


Arterial Blood HCO3


  


  20 mmol/L


(21-28) 


  


 


 


Arterial Blood Base Excess


  


  -5 mmol/L


(-3-3) 


  


 


 


Oxyhemoglobin  98.3 %   


 


Methemoglobin


  


  0.3 %


(0.0-1.9) 


  


 


 


Carbon Monoxide, Quantitative


  


  0.1 %


(0.0-1.9) 


  


 


 


FiO2  100   


 


Sodium Level


  


  139 mmol/L


(136-145) 


  


 


 


Potassium Level


  


  4.8 mmol/L


(3.5-5.1) 


  


 


 


Chloride Level


  


  104 mmol/L


() 


  


 


 


Carbon Dioxide Level


  


  23 mmol/L


(21-32) 


  


 


 


Anion Gap  12 (6-14)   


 


Blood Urea Nitrogen


  


  55 mg/dL


(8-26) 


  


 


 


Creatinine


  


  4.8 mg/dL


(0.7-1.3) 


  


 


 


Estimated GFR


(Cockcroft-Gault) 


  12.8 


  


  


 


 


Glucose Level


  


  159 mg/dL


(70-99) 


  


 


 


Calcium Level


  


  9.6 mg/dL


(8.5-10.1) 


  


 


 


Magnesium Level


  


  3.1 mg/dL


(1.8-2.4) 


  


 


 


Glucose (Fingerstick)


  


  


  161 mg/dL


(70-99) 161 mg/dL


(70-99)


 


Test


  10/4/17


16:15 10/4/17


17:06 10/4/17


18:00 10/4/17


18:06


 


Glucose (Fingerstick)


  187 mg/dL


(70-99) 151 mg/dL


(70-99) 


  148 mg/dL


(70-99)


 


White Blood Count


  


  


  11.6 x10^3/uL


(4.0-11.0) 


 


 


Red Blood Count


  


  


  2.82 x10^6/uL


(4.30-5.70) 


 


 


Hemoglobin


  


  


  7.8 g/dL


(13.0-17.5) 


 


 


Hematocrit


  


  


  23.8 %


(39.0-53.0) 


 


 


Mean Corpuscular Volume   84 fL ()  


 


Mean Corpuscular Hemoglobin   28 pg (25-35)  


 


Mean Corpuscular Hemoglobin


Concent 


  


  33 g/dL


(31-37) 


 


 


Red Cell Distribution Width


  


  


  14.1 %


(11.5-14.5) 


 


 


Platelet Count


  


  


  197 x10^3/uL


(140-400) 


 


 


Potassium Level


  


  


  4.7 mmol/L


(3.5-5.1) 


 


 


Test


  10/4/17


18:25 10/4/17


19:19 10/4/17


20:19 10/4/17


21:18


 


O2 Saturation 98 % (92-99)    


 


Arterial Blood pH


  7.40


(7.35-7.45) 


  


  


 


 


Arterial Blood pCO2 at


Patient Temp 36 mmHg


(35-46) 


  


  


 


 


Arterial Blood pO2 at Patient


Temp 148 mmHg


() 


  


  


 


 


Arterial Blood HCO3


  21 mmol/L


(21-28) 


  


  


 


 


Arterial Blood Base Excess


  -3 mmol/L


(-3-3) 


  


  


 


 


FiO2 40    


 


Glucose (Fingerstick)


  


  138 mg/dL


(70-99) 157 mg/dL


(70-99) 159 mg/dL


(70-99)


 


Test


  10/4/17


23:32 10/5/17


00:21 10/5/17


01:22 10/5/17


02:18


 


Glucose (Fingerstick)


  126 mg/dL


(70-99) 124 mg/dL


(70-99) 121 mg/dL


(70-99) 135 mg/dL


(70-99)


 


Test


  10/5/17


03:18 10/5/17


04:18 10/5/17


05:20 10/5/17


05:21


 


Glucose (Fingerstick)


  151 mg/dL


(70-99) 160 mg/dL


(70-99) 


  167 mg/dL


(70-99)


 


White Blood Count


  


  


  13.2 x10^3/uL


(4.0-11.0) 


 


 


Red Blood Count


  


  


  2.83 x10^6/uL


(4.30-5.70) 


 


 


Hemoglobin


  


  


  8.0 g/dL


(13.0-17.5) 


 


 


Hematocrit


  


  


  24.1 %


(39.0-53.0) 


 


 


Mean Corpuscular Volume   85 fL ()  


 


Mean Corpuscular Hemoglobin   28 pg (25-35)  


 


Mean Corpuscular Hemoglobin


Concent 


  


  33 g/dL


(31-37) 


 


 


Red Cell Distribution Width


  


  


  14.0 %


(11.5-14.5) 


 


 


Platelet Count


  


  


  187 x10^3/uL


(140-400) 


 


 


Sodium Level


  


  


  141 mmol/L


(136-145) 


 


 


Potassium Level


  


  


  4.9 mmol/L


(3.5-5.1) 


 


 


Chloride Level


  


  


  105 mmol/L


() 


 


 


Carbon Dioxide Level


  


  


  24 mmol/L


(21-32) 


 


 


Anion Gap   12 (6-14)  


 


Blood Urea Nitrogen


  


  


  60 mg/dL


(8-26) 


 


 


Creatinine


  


  


  4.9 mg/dL


(0.7-1.3) 


 


 


Estimated GFR


(Cockcroft-Gault) 


  


  12.5 


  


 


 


Glucose Level


  


  


  179 mg/dL


(70-99) 


 


 


Calcium Level


  


  


  8.8 mg/dL


(8.5-10.1) 


 


 


Magnesium Level


  


  


  2.8 mg/dL


(1.8-2.4) 


 


 


Test


  10/5/17


06:25 10/5/17


07:31 10/5/17


08:40 10/5/17


09:47


 


Glucose (Fingerstick)


  170 mg/dL


(70-99) 159 mg/dL


(70-99) 123 mg/dL


(70-99) 120 mg/dL


(70-99)


 


Test


  10/5/17


10:51 


  


  


 


 


Glucose (Fingerstick)


  121 mg/dL


(70-99) 


  


  


 











Review of Systems


Musculoskeletal:  Yes: muscle stiffness





Assessment


Assessment


IMP





SEVERE CAD


ANEMIA


DM II


HTN


ESRD


LEFT CAROTID ARTERY REPAIR


S/P CABG 3 VESSELS





PLAN





HD TODAY


UF TO GURMEET


CONT RACHEAL MCKOY MD Oct 5, 2017 11:34

## 2017-10-05 NOTE — RAD
PORTABLE CHEST 1V



Clinical Indication: POST OP, chest pain



Comparison: 10/4/2017 chest radiograph



Findings: Patient is rotated to the right.



Interval removal of endotracheal tube and enteric tube. Interval removal of

the [employer] catheter with persistent left subclavian catheter sheath. No

significant change in right IJ dual-lumen central catheter and mediastinal

drains. Low lung volume. Left basilar heterogenous air space opacities. Stable

pulmonary vasculature. No pleural effusion or pneumothorax. The

cardiomediastinal silhouette and great vessels are stable. Median sternotomy.



IMPRESSION:



1. Left basilar heterogenous air space opacities may relate to atelectasis.

Mild pulmonary edema or infectious process cannot be excluded. Recommend

continued attention on follow-up.

2. Interval removal of endotracheal tube, enteric tube, and [employer]

catheter. Life support devices otherwise as above.

## 2017-10-05 NOTE — EKG
[facility]

               [address] [place] [address]

Test Date:    2017-10-05               Test Time:    03:22:16

Pat Name:     ARABELLA WASHINGTON           Department:   

Patient ID:   [id number]           Room:         105 1

Gender:       M                        Technician:   

:          [id number]               Requested By: MARIZA ISAAC

Order Number: [id number]            Reading MD:     

                                 Measurements

Intervals                              Axis          

Rate:         101                      P:            52

SD:           184                      QRS:          -32

QRSD:         86                       T:            33

QT:           336                                    

QTc:          436                                    

                           Interpretive Statements

SINUS TACHYCARDIA

ABNORMAL LEFT AXIS DEVIATION

LEFT ANTERIOR FASCICULAR BLOCK

ABNORMAL ECG

RI6.01

Compared to ECG 2017 16:29:17

Left anterior fascicular block now present

Sinus rhythm no longer present

## 2017-10-06 NOTE — PDOC
Renal-Progress Notes


Subjective Notes


Notes


SLEEPING





History of Present Illness


Hx of present illness


STABLE





Vitals


Vitals





Vital Signs








  Date Time  Temp Pulse Resp B/P (MAP) Pulse Ox O2 Delivery O2 Flow Rate FiO2


 


10/6/17 11:15 97.8 94 26 131/85    





 97.8       


 


10/6/17 06:00      Room Air  


 


10/6/17 04:00     96   


 


10/5/17 17:00       3.0 








Weight


Weight [ ]





I.O.


Intake and Output











Intake and Output 


 


 10/7/17





 07:00


 


Intake Total 100 ml


 


Balance 100 ml


 


 


 


Blood Product IV Normal Saline Flush 100 ml











Labs


Labs





Laboratory Tests








Test


  10/5/17


11:59 10/5/17


13:06 10/5/17


14:04 10/5/17


15:13


 


Glucose (Fingerstick)


  149 mg/dL


(70-99) 214 mg/dL


(70-99) 210 mg/dL


(70-99) 244 mg/dL


(70-99)


 


Test


  10/5/17


16:26 10/5/17


19:09 10/5/17


21:01 10/6/17


06:00


 


Glucose (Fingerstick)


  222 mg/dL


(70-99) 209 mg/dL


(70-99) 163 mg/dL


(70-99) 


 


 


White Blood Count


  


  


  


  15.0 x10^3/uL


(4.0-11.0)


 


Red Blood Count


  


  


  


  2.56 x10^6/uL


(4.30-5.70)


 


Hemoglobin


  


  


  


  7.1 g/dL


(13.0-17.5)


 


Hematocrit


  


  


  


  21.8 %


(39.0-53.0)


 


Mean Corpuscular Volume    85 fL () 


 


Mean Corpuscular Hemoglobin    28 pg (25-35) 


 


Mean Corpuscular Hemoglobin


Concent 


  


  


  33 g/dL


(31-37)


 


Red Cell Distribution Width


  


  


  


  14.4 %


(11.5-14.5)


 


Platelet Count


  


  


  


  223 x10^3/uL


(140-400)


 


Sodium Level


  


  


  


  134 mmol/L


(136-145)


 


Potassium Level


  


  


  


  4.4 mmol/L


(3.5-5.1)


 


Chloride Level


  


  


  


  96 mmol/L


()


 


Carbon Dioxide Level


  


  


  


  25 mmol/L


(21-32)


 


Anion Gap    13 (6-14) 


 


Blood Urea Nitrogen


  


  


  


  58 mg/dL


(8-26)


 


Creatinine


  


  


  


  4.1 mg/dL


(0.7-1.3)


 


Estimated GFR


(Cockcroft-Gault) 


  


  


  15.3 


 


 


Glucose Level


  


  


  


  324 mg/dL


(70-99)


 


Calcium Level


  


  


  


  7.9 mg/dL


(8.5-10.1)











Review of Systems


Constitutional:  yes: other (SLEEPING)


Musculoskeletal:  Yes: muscle stiffness





Physical Exam


General Appearance:  no apparent distress


Skin:  warm


Respiratory:  bilateral CTA


Heart:  S1S2


Abdomen:  soft, bowel sounds present


Genitourinary:  bladder flat


Extremities:  pulses present


Neurology:  alert, oriented





Assessment


Assessment


IMP





SEVERE CAD


ANEMIA


DM II


HTN


ESRD


LEFT CAROTID ARTERY REPAIR


S/P CABG 3 VESSELS





PLAN





HD TODAY


UF TO DW


CONT JOANNE


PRBC WITH HD TODAY











RACHEAL GUERRERO MD Oct 6, 2017 11:44

## 2017-10-06 NOTE — PDOC
Provider Note


Provider Note


sleeping, dialysis- labs ok, glucose up so inc levemir to 30 bid, rest same- 

may need prbcs











ARIC MATUTE MD Oct 6, 2017 08:57

## 2017-10-06 NOTE — RAD
Portable chest, 10/6/2017:



History: Chest tube removal



Comparison is made to a study from earlier the same day. The right jugular

dialysis type catheter and left subclavian vascular sheath are unchanged in

positions. The left pleural drain has been removed. There is no evidence of

pneumothorax or significant pleural fluid. The heart size and pulmonary

vascularity are normal. There is only minimal linear atelectasis in the left

base. No pulmonary consolidation is seen.



IMPRESSION:

1. Minimal left basilar linear atelectasis.

2. The portable chest is otherwise unremarkable.

## 2017-10-06 NOTE — RAD
Portable chest, 10/6/2017:



History: Shortness of breath



Comparison is made to a study from 10/5/2017. 



A right jugular dialysis catheter extends into the superior aspect of the

right atrium. A left subclavian vascular sheath remains in place extending to

the region of the left innominate vein. A left chest tube remains in place.

Mediastinal drains have been removed.



The heart size and pulmonary vascularity are normal. Mild bibasilar

atelectasis has largely cleared. There is no evidence of pneumothorax or

pleural fluid.





IMPRESSION:

1. Residual tubes as described above.

2. Nearly complete interval clearing of the left base.

3. No significant postoperative cardiopulmonary abnormality is detected.

## 2017-10-06 NOTE — PDOC
Progress Note


Subjective


Subjective


Doing very well. Normotensive and in sinus rhythm. On room air. Had dialysis 

today, also received 1 unit PRBC. Minimal chest tube output. Chest x-ray looks 

good. Incisions clean.





ROS


ROS


No nausea


No vomiting


No pain


No rash





Vital Sign


Vital Signs





Vital Signs








  Date Time  Temp Pulse Resp B/P (MAP) Pulse Ox O2 Delivery O2 Flow Rate FiO2


 


10/6/17 12:14 98.8 96 24 126/76 (93) 94 Room Air  





 98.8       


 


10/5/17 17:00       3.0 











Physical Exam


PHYSICAL EXAM


GENERAL:  NAD, Alert


HEENT:  PERRL, OC/OP


NECK:  Supple, no JVD, no LN


LUNGS:  Clear


HEART:  S1S2, no gallop, no murmur


ABD:  Soft, NT, no organomegaly, no rebound


EXT:  No edema, no cyanosis


CNS:  Alert, oriented x 3, no focal neurologic deficit


SKIN:  No rash


IV: ok





Labs


Lab





Laboratory Tests








Test


  10/5/17


15:13 10/5/17


16:26 10/5/17


19:09 10/5/17


21:01


 


Glucose (Fingerstick)


  244 mg/dL


(70-99) 222 mg/dL


(70-99) 209 mg/dL


(70-99) 163 mg/dL


(70-99)


 


Test


  10/6/17


06:00 10/6/17


12:46 


  


 


 


White Blood Count


  15.0 x10^3/uL


(4.0-11.0) 


  


  


 


 


Red Blood Count


  2.56 x10^6/uL


(4.30-5.70) 


  


  


 


 


Hemoglobin


  7.1 g/dL


(13.0-17.5) 


  


  


 


 


Hematocrit


  21.8 %


(39.0-53.0) 


  


  


 


 


Mean Corpuscular Volume 85 fL ()    


 


Mean Corpuscular Hemoglobin 28 pg (25-35)    


 


Mean Corpuscular Hemoglobin


Concent 33 g/dL


(31-37) 


  


  


 


 


Red Cell Distribution Width


  14.4 %


(11.5-14.5) 


  


  


 


 


Platelet Count


  223 x10^3/uL


(140-400) 


  


  


 


 


Sodium Level


  134 mmol/L


(136-145) 


  


  


 


 


Potassium Level


  4.4 mmol/L


(3.5-5.1) 


  


  


 


 


Chloride Level


  96 mmol/L


() 


  


  


 


 


Carbon Dioxide Level


  25 mmol/L


(21-32) 


  


  


 


 


Anion Gap 13 (6-14)    


 


Blood Urea Nitrogen


  58 mg/dL


(8-26) 


  


  


 


 


Creatinine


  4.1 mg/dL


(0.7-1.3) 


  


  


 


 


Estimated GFR


(Cockcroft-Gault) 15.3 


  


  


  


 


 


Glucose Level


  324 mg/dL


(70-99) 


  


  


 


 


Calcium Level


  7.9 mg/dL


(8.5-10.1) 


  


  


 


 


Glucose (Fingerstick)


  


  135 mg/dL


(70-99) 


  


 











Objective


Assessment


POD#2, s/p CABG x 3 (LIMA to LAD, SVG to RPDA, SVG to OM). 


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





Doing very well. Normotensive and in sinus rhythm. On room air. Had dialysis 

today, also received 1 unit PRBC. Minimal chest tube output. Chest x-ray looks 

good. Incisions clean.





Plan


Plan of Care


D/c left subclavian cordis


D/c pleural drain


D/c walker


D/c pacing wires


ASA, b blocker and statin


Oral amiodarone for A. fib prophylaxis


PT/OT


I/S and pulmonary toilet


Transfer to stepdown











MATTIE RAIN MD Oct 6, 2017 14:25

## 2017-10-07 NOTE — PDOC
Provider Note


Provider Note


POD # 3 cabg


 Pt awake, alert, up in chair


 SR 80


 VSS


 wound sites ok


 pt is ambulating with prosthesis


tolerating HD well





labs today pending


cxr looks great





Plan:  Advance diet/activity


 Ok to go to tele bed











JUAN TOMPKINS MD Oct 7, 2017 09:46

## 2017-10-07 NOTE — RAD
Portable chest, 10/7/2017:



History: Postop respiratory distress



Comparison is made to yesterday's study. A right jugular dialysis type

catheter extends to the level of the atriocaval junction. The patient is

rotated to the right. The heart size is unchanged. The pulmonary vascularity

is normal. There is minimal unchanged linear atelectasis in the left lower

chest. No pulmonary consolidation is seen. There is no evidence of pleural

fluid or pneumothorax.



IMPRESSION: No significant change since yesterday's study.

## 2017-10-07 NOTE — PDOC
PROGRESS NOTES


Subjective


Subjective


This patient was seen yesterday but no note was entered due to the computer 

continuing to freeze up and after an hour and a half of trying to put the note 

in I gave up.





He feels better today.





Need to increase activity.





Objective


Objective





Vital Signs








  Date Time  Temp Pulse Resp B/P (MAP) Pulse Ox O2 Delivery O2 Flow Rate FiO2


 


10/7/17 12:28   18  96 Nasal Cannula 2.0 


 


10/7/17 12:00 97.7 74  135/76 (95)    





 97.7       














Intake and Output 


 


 10/8/17





 07:00


 


Intake Total 360 ml


 


Output Total 200 ml


 


Balance 160 ml


 


 


 


Intake Oral 360 ml


 


Output Urine Total 200 ml











Physical Exam


Physical Exam


No significant changes and cardiac exam





Assessment


Assessment


Patient doing fine post CABG.





I agree with present plan.





Problems


Medical Problems:


(1) Chest pain


Status: Acute  








Comment


Review of Relevant


I have reviewed the following items juan (where applicable) has been applied.


Labs





Laboratory Tests








Test


  10/5/17


15:13 10/5/17


16:26 10/5/17


19:09 10/5/17


21:01


 


Glucose (Fingerstick)


  244 mg/dL


(70-99) 222 mg/dL


(70-99) 209 mg/dL


(70-99) 163 mg/dL


(70-99)


 


Test


  10/6/17


06:00 10/6/17


12:46 10/6/17


17:18 10/7/17


06:18


 


White Blood Count


  15.0 x10^3/uL


(4.0-11.0) 


  


  


 


 


Red Blood Count


  2.56 x10^6/uL


(4.30-5.70) 


  


  


 


 


Hemoglobin


  7.1 g/dL


(13.0-17.5) 


  


  


 


 


Hematocrit


  21.8 %


(39.0-53.0) 


  


  


 


 


Mean Corpuscular Volume 85 fL ()    


 


Mean Corpuscular Hemoglobin 28 pg (25-35)    


 


Mean Corpuscular Hemoglobin


Concent 33 g/dL


(31-37) 


  


  


 


 


Red Cell Distribution Width


  14.4 %


(11.5-14.5) 


  


  


 


 


Platelet Count


  223 x10^3/uL


(140-400) 


  


  


 


 


Sodium Level


  134 mmol/L


(136-145) 


  


  


 


 


Potassium Level


  4.4 mmol/L


(3.5-5.1) 


  


  


 


 


Chloride Level


  96 mmol/L


() 


  


  


 


 


Carbon Dioxide Level


  25 mmol/L


(21-32) 


  


  


 


 


Anion Gap 13 (6-14)    


 


Blood Urea Nitrogen


  58 mg/dL


(8-26) 


  


  


 


 


Creatinine


  4.1 mg/dL


(0.7-1.3) 


  


  


 


 


Estimated GFR


(Cockcroft-Gault) 15.3 


  


  


  


 


 


Glucose Level


  324 mg/dL


(70-99) 


  


  


 


 


Calcium Level


  7.9 mg/dL


(8.5-10.1) 


  


  


 


 


Glucose (Fingerstick)


  


  135 mg/dL


(70-99) 339 mg/dL


(70-99) 184 mg/dL


(70-99)


 


Test


  10/7/17


08:22 10/7/17


10:45 10/7/17


12:26 


 


 


Glucose (Fingerstick)


  150 mg/dL


(70-99) 


  286 mg/dL


(70-99) 


 


 


White Blood Count


  


  14.3 x10^3/uL


(4.0-11.0) 


  


 


 


Red Blood Count


  


  3.01 x10^6/uL


(4.30-5.70) 


  


 


 


Hemoglobin


  


  8.5 g/dL


(13.0-17.5) 


  


 


 


Hematocrit


  


  25.9 %


(39.0-53.0) 


  


 


 


Mean Corpuscular Volume  86 fL ()   


 


Mean Corpuscular Hemoglobin  28 pg (25-35)   


 


Mean Corpuscular Hemoglobin


Concent 


  33 g/dL


(31-37) 


  


 


 


Red Cell Distribution Width


  


  14.2 %


(11.5-14.5) 


  


 


 


Platelet Count


  


  249 x10^3/uL


(140-400) 


  


 


 


Neutrophils (%) (Auto)  75 % (31-73)   


 


Lymphocytes (%) (Auto)  16 % (24-48)   


 


Monocytes (%) (Auto)  8 % (0-9)   


 


Eosinophils (%) (Auto)  1 % (0-3)   


 


Basophils (%) (Auto)  0 % (0-3)   


 


Neutrophils # (Auto)


  


  10.7 x10^3uL


(1.8-7.7) 


  


 


 


Lymphocytes # (Auto)


  


  2.3 x10^3/uL


(1.0-4.8) 


  


 


 


Monocytes # (Auto)


  


  1.1 x10^3/uL


(0.0-1.1) 


  


 


 


Eosinophils # (Auto)


  


  0.1 x10^3/uL


(0.0-0.7) 


  


 


 


Basophils # (Auto)


  


  0.1 x10^3/uL


(0.0-0.2) 


  


 


 


Sodium Level


  


  137 mmol/L


(136-145) 


  


 


 


Potassium Level


  


  3.5 mmol/L


(3.5-5.1) 


  


 


 


Chloride Level


  


  97 mmol/L


() 


  


 


 


Carbon Dioxide Level


  


  30 mmol/L


(21-32) 


  


 


 


Anion Gap  10 (6-14)   


 


Blood Urea Nitrogen


  


  53 mg/dL


(8-26) 


  


 


 


Creatinine


  


  3.9 mg/dL


(0.7-1.3) 


  


 


 


Estimated GFR


(Cockcroft-Gault) 


  16.3 


  


  


 


 


Glucose Level


  


  261 mg/dL


(70-99) 


  


 


 


Calcium Level


  


  8.1 mg/dL


(8.5-10.1) 


  


 


 


Magnesium Level


  


  2.6 mg/dL


(1.8-2.4) 


  


 








Laboratory Tests








Test


  10/6/17


17:18 10/7/17


06:18 10/7/17


08:22 10/7/17


10:45


 


Glucose (Fingerstick)


  339 mg/dL


(70-99) 184 mg/dL


(70-99) 150 mg/dL


(70-99) 


 


 


White Blood Count


  


  


  


  14.3 x10^3/uL


(4.0-11.0)


 


Red Blood Count


  


  


  


  3.01 x10^6/uL


(4.30-5.70)


 


Hemoglobin


  


  


  


  8.5 g/dL


(13.0-17.5)


 


Hematocrit


  


  


  


  25.9 %


(39.0-53.0)


 


Mean Corpuscular Volume    86 fL () 


 


Mean Corpuscular Hemoglobin    28 pg (25-35) 


 


Mean Corpuscular Hemoglobin


Concent 


  


  


  33 g/dL


(31-37)


 


Red Cell Distribution Width


  


  


  


  14.2 %


(11.5-14.5)


 


Platelet Count


  


  


  


  249 x10^3/uL


(140-400)


 


Neutrophils (%) (Auto)    75 % (31-73) 


 


Lymphocytes (%) (Auto)    16 % (24-48) 


 


Monocytes (%) (Auto)    8 % (0-9) 


 


Eosinophils (%) (Auto)    1 % (0-3) 


 


Basophils (%) (Auto)    0 % (0-3) 


 


Neutrophils # (Auto)


  


  


  


  10.7 x10^3uL


(1.8-7.7)


 


Lymphocytes # (Auto)


  


  


  


  2.3 x10^3/uL


(1.0-4.8)


 


Monocytes # (Auto)


  


  


  


  1.1 x10^3/uL


(0.0-1.1)


 


Eosinophils # (Auto)


  


  


  


  0.1 x10^3/uL


(0.0-0.7)


 


Basophils # (Auto)


  


  


  


  0.1 x10^3/uL


(0.0-0.2)


 


Sodium Level


  


  


  


  137 mmol/L


(136-145)


 


Potassium Level


  


  


  


  3.5 mmol/L


(3.5-5.1)


 


Chloride Level


  


  


  


  97 mmol/L


()


 


Carbon Dioxide Level


  


  


  


  30 mmol/L


(21-32)


 


Anion Gap    10 (6-14) 


 


Blood Urea Nitrogen


  


  


  


  53 mg/dL


(8-26)


 


Creatinine


  


  


  


  3.9 mg/dL


(0.7-1.3)


 


Estimated GFR


(Cockcroft-Gault) 


  


  


  16.3 


 


 


Glucose Level


  


  


  


  261 mg/dL


(70-99)


 


Calcium Level


  


  


  


  8.1 mg/dL


(8.5-10.1)


 


Magnesium Level


  


  


  


  2.6 mg/dL


(1.8-2.4)


 


Test


  10/7/17


12:26 


  


  


 


 


Glucose (Fingerstick)


  286 mg/dL


(70-99) 


  


  


 








Medications





Current Medications


Iodixanol (Visipaque 320) 100 ml STK-MED ONCE .ROUTE ;  Start 9/27/17 at 11:27;

  Stop 9/27/17 at 11:28;  Status DC


Lidocaine HCl 20 ml STK-MED ONCE .ROUTE ;  Start 9/27/17 at 11:27;  Stop 9/27/ 17 at 11:28;  Status DC


Heparin Sodium/ Sodium Chloride 1,000 ml @  As Directed STK-MED ONCE .ROUTE ;  

Start 9/27/17 at 11:28;  Stop 9/27/17 at 11:29;  Status DC


Ondansetron HCl (Zofran) 4 mg PRN Q8HRS  PRN IV NAUSEA/VOMITING;  Start 9/27/17 

at 12:30;  Stop 9/28/17 at 12:29;  Status DC


Fentanyl Citrate (Fentanyl 2ml Vial) 100 mcg STK-MED ONCE .ROUTE ;  Start 9/27/ 17 at 12:33;  Stop 9/27/17 at 12:34;  Status DC


Midazolam HCl (Versed) 5 mg STK-MED ONCE .ROUTE ;  Start 9/27/17 at 12:33;  

Stop 9/27/17 at 12:34;  Status DC


Heparin Sodium (Porcine) (Heparin Sodium) 10,000 unit STK-MED ONCE .ROUTE ;  

Start 9/27/17 at 12:59;  Stop 9/27/17 at 13:00;  Status DC


Heparin Sodium/ Dextrose 500 ml @  As Directed STK-MED ONCE IV ;  Start 9/27/17 

at 13:21;  Stop 9/27/17 at 13:22;  Status DC


Heparin Sodium/ Sodium Chloride 1,000 unit 1X  ONCE IART  Last administered on 9 /27/17at 13:40;  Start 9/27/17 at 13:00;  Stop 9/27/17 at 13:47;  Status DC


Midazolam HCl (Versed) 5 mg 1X  ONCE IV  Last administered on 9/27/17at 13:41;  

Start 9/27/17 at 13:00;  Stop 9/27/17 at 13:47;  Status DC


Fentanyl Citrate (Fentanyl 2ml Vial) 100 mcg 1X  ONCE IV  Last administered on 9 /27/17at 13:42;  Start 9/27/17 at 13:00;  Stop 9/27/17 at 13:47;  Status DC


Iodixanol (Visipaque 320) 100 ml 1X  ONCE IART  Last administered on 9/27/17at 

13:40;  Start 9/27/17 at 13:00;  Stop 9/27/17 at 13:47;  Status DC


Heparin Sodium (Porcine) (Heparin Sodium) 8,000 unit 1X  ONCE IV  Last 

administered on 9/27/17at 13:43;  Start 9/27/17 at 13:00;  Stop 9/27/17 at 13:47

;  Status DC


Lidocaine HCl 20 ml 1X  ONCE IJ  Last administered on 9/27/17at 13:40;  Start 9/ 27/17 at 13:00;  Stop 9/27/17 at 13:47;  Status DC


Heparin Sodium (Porcine) (Heparin 5,000 Units/1,000ml NS) 5,000 unit 1X  ONCE 

IV  Last administered on 9/27/17at 13:00;  Start 9/27/17 at 13:00;  Stop 9/27/ 17 at 13:47;  Status DC


Heparin Sodium/ Dextrose 500 ml @  20 mls/hr CONT PRN  PRN IV SEE COMMENTS Last 

administered on 9/27/17at 13:45;  Start 9/27/17 at 13:00;  Stop 9/28/17 at 09:04

;  Status DC


Oxycodone/ Acetaminophen (Percocet 10/325) 1 tab PRN Q6HRS  PRN PO PAIN Last 

administered on 9/30/17at 01:11;  Start 9/27/17 at 16:45;  Stop 9/30/17 at 12:30

;  Status DC


Cyclobenzaprine HCl (Flexeril) 10 mg PRN Q6HRS  PRN PO MUSCLE SPASMS Last 

administered on 10/5/17at 23:02;  Start 9/27/17 at 16:45


Diazepam (Valium) 5 mg PRN Q6HRS  PRN PO ANXIETY Last administered on 10/1/17at 

08:55;  Start 9/27/17 at 16:45;  Stop 10/1/17 at 12:32;  Status DC


Polyethylene Glycol (miraLAX PACKET) 17 gm DAILY PO ;  Start 9/28/17 at 09:00;  

Stop 9/28/17 at 09:00;  Status DC


Polyethylene Glycol (miraLAX PACKET) 17 gm DAILY PO  Last administered on 10/5/

17at 12:12;  Start 9/27/17 at 21:00


Fentanyl Citrate (Fentanyl 2ml Vial) 50 mcg PRN Q3HRS  PRN IV SEVERE PAIN Last 

administered on 10/6/17at 15:26;  Start 9/28/17 at 01:45


Zolpidem Tartrate (Ambien) 5 mg PRN QHS  PRN PO INSOMNIA, MAY REPEAT IN 1HR 

Last administered on 10/5/17at 22:56;  Start 9/28/17 at 09:30


Cefazolin Sodium/ Dextrose 50 ml @  100 mls/hr 1X  ONCE IV  Last administered 

on 10/3/17at 08:32;  Start 10/3/17 at 06:00;  Stop 10/3/17 at 06:29;  Status DC


Amlodipine Besylate (Norvasc) 10 mg DAILY PO  Last administered on 10/3/17at 08:

48;  Start 9/28/17 at 11:00;  Stop 10/4/17 at 14:06;  Status DC


Aspirin (Myles Aspirin) 325 mg DAILY PO ;  Start 9/28/17 at 11:00;  Stop 9/28/ 17 at 11:00;  Status DC


Clopidogrel Bisulfate (Plavix) 75 mg DAILY PO ;  Start 9/28/17 at 11:00;  Stop 9 /28/17 at 11:00;  Status DC


Isosorbide Mononitrate (Imdur) 30 mg DAILY PO  Last administered on 10/3/17at 08

:48;  Start 9/28/17 at 11:00;  Stop 10/4/17 at 14:06;  Status DC


Losartan Potassium (Cozaar) 100 mg DAILY08 PO ;  Start 9/28/17 at 11:00;  Stop 9 /28/17 at 11:00;  Status DC


Oxycodone/ Acetaminophen (Percocet 10/325) 1 tab QID PO  Last administered on 10

/3/17at 22:15;  Start 9/28/17 at 13:00;  Stop 10/4/17 at 18:25;  Status DC


Ranolazine (Ranexa) 500 mg BID PO  Last administered on 10/3/17at 22:14;  Start 

9/28/17 at 11:00;  Stop 10/4/17 at 14:06;  Status DC


Sodium Bicarbonate (Sodium Bicarbonate) 650 mg TID PO  Last administered on 10/3

/17at 22:13;  Start 9/28/17 at 14:00;  Stop 10/4/17 at 18:28;  Status DC


Oxycodone/ Acetaminophen (Percocet 10/325) 1 tab PRN Q6HRS  PRN PO PAIN Last 

administered on 10/4/17at 00:47;  Start 9/28/17 at 10:30;  Stop 10/4/17 at 18:25

;  Status DC


Darbepoetin Benny (Aranesp) 60 mcg Th SQ  Last administered on 10/5/17at 20:56;  

Start 9/28/17 at 21:00;  Stop 10/7/17 at 10:24;  Status DC


Insulin Aspart (NovoLOG) 10 units 1X  ONCE SQ  Last administered on 9/28/17at 21

:40;  Start 9/28/17 at 22:00;  Stop 9/28/17 at 22:01;  Status DC


Insulin Detemir (Levemir) 40 units BID SQ  Last administered on 9/30/17at 09:20

;  Start 9/28/17 at 22:00;  Stop 9/30/17 at 12:31;  Status DC


Sodium Chloride 1,000 ml @  1,000 mls/hr Q1H PRN IV hypotension;  Start 9/29/17 

at 06:52;  Stop 9/29/17 at 12:51;  Status DC


Sodium Chloride (Normal Saline Flush) 10 ml 1X PRN  PRN IV AP catheter pack;  

Start 9/29/17 at 07:00;  Stop 9/30/17 at 06:59;  Status DC


Sodium Chloride (Normal Saline Flush) 10 ml 1X PRN  PRN IV  catheter pack;  

Start 9/29/17 at 07:00;  Stop 9/30/17 at 06:59;  Status DC


Info (PHARMACY MONITORING -- do not chart) 1 each PRN DAILY  PRN MC SEE COMMENTS

;  Start 9/29/17 at 07:00


Insulin Aspart (NovoLOG) 10 units TIDAC  PRN SQ BLOOD SUGAR > 200 Last 

administered on 10/1/17at 08:51;  Start 9/29/17 at 18:15;  Stop 10/1/17 at 10:29

;  Status DC


Insulin Aspart (NovoLOG) 20 units TIDAC  PRN SQ BLOOD SUGAR > 300 Last 

administered on 9/29/17at 18:34;  Start 9/29/17 at 18:15;  Stop 10/1/17 at 10:29

;  Status DC


Insulin Detemir (Levemir) 45 units BID SQ  Last administered on 10/1/17at 08:50

;  Start 9/30/17 at 21:00;  Stop 10/1/17 at 10:29;  Status DC


Diazepam (Valium) 2 mg PRN Q8HRS  PRN PO ANXIETY;  Start 9/30/17 at 12:30;  

Stop 10/1/17 at 12:32;  Status DC


Insulin Detemir (Levemir) 55 units BID SQ  Last administered on 10/3/17at 22:21

;  Start 10/1/17 at 21:00;  Stop 10/4/17 at 07:54;  Status DC


Insulin Aspart (NovoLOG) 20 units TIDAC SQ  Last administered on 10/1/17at 18:35

;  Start 10/1/17 at 11:30;  Stop 10/2/17 at 08:37;  Status DC


Insulin Aspart (NovoLOG) 0-9 UNITS TIDWMEALS SQ  Last administered on 10/3/17at 

12:37;  Start 10/1/17 at 12:00;  Stop 10/4/17 at 07:54;  Status DC


Dextrose (Dextrose 50%-Water Syringe) 12.5 gm PRN Q15MIN  PRN IV SEE COMMENTS;  

Start 10/1/17 at 10:30;  Status Cancel


Diazepam (Valium) 5 mg 1X  ONCE PO  Last administered on 10/1/17at 11:52;  

Start 10/1/17 at 11:00;  Stop 10/1/17 at 11:01;  Status DC


Diazepam (Valium) 10 mg PRN Q6HRS  PRN PO ANXIETY Last administered on 10/3/

17at 15:31;  Start 10/1/17 at 12:30;  Stop 10/4/17 at 14:07;  Status DC


Sodium Chloride 1,000 ml @  1,000 mls/hr Q1H PRN IV hypotension;  Start 10/2/17 

at 07:51;  Stop 10/2/17 at 13:50;  Status DC


Info (PHARMACY MONITORING -- do not chart) 1 each PRN DAILY  PRN MC SEE COMMENTS

;  Start 10/2/17 at 08:00;  Status UNV


Insulin Aspart (NovoLOG) 15 units TIDAC SQ  Last administered on 10/3/17at 17:39

;  Start 10/2/17 at 11:30;  Stop 10/4/17 at 07:54;  Status DC


Ondansetron HCl (Zofran) 4 mg PRN Q6HRS  PRN IV NAUSEA/VOMITING;  Start 10/3/17 

at 07:00;  Stop 10/3/17 at 09:10;  Status DC


Fentanyl Citrate (Fentanyl 2ml Vial) 25 mcg PRN Q5MIN  PRN IV MILD PAIN;  Start 

10/3/17 at 07:00;  Stop 10/4/17 at 06:59;  Status Cancel


Fentanyl Citrate (Fentanyl 2ml Vial) 50 mcg PRN Q5MIN  PRN IV MODERATE PAIN;  

Start 10/3/17 at 07:00;  Stop 10/4/17 at 06:59;  Status Cancel


Morphine Sulfate 1 mg PRN Q10MIN  PRN IV SEVERE PAIN;  Start 10/3/17 at 07:00;  

Stop 10/4/17 at 06:59;  Status Cancel


Ringer's Solution 1,000 ml @  30 mls/hr Q24H IV ;  Start 10/3/17 at 07:00;  

Stop 10/3/17 at 18:59;  Status Cancel


Lidocaine HCl (Xylocaine-Mpf 1% Vial) 2 ml PRN 1X  PRN ID IV START;  Start 10/3/

17 at 07:00;  Stop 10/4/17 at 06:59;  Status Cancel


Hydromorphone HCl (Dilaudid) 0.5 mg PRN Q10MIN  PRN IV SEV PAIN, Second choice;

  Start 10/3/17 at 07:00;  Stop 10/4/17 at 06:59;  Status Cancel


Prochlorperazine Edisylate (Compazine) 5 mg PACU PRN  PRN IV NAUSEA, MRX1;  

Start 10/3/17 at 07:00;  Stop 10/4/17 at 06:59;  Status Cancel


Cefazolin Sodium 1 gm/Sodium Chloride 500 ml @  500 mls/hr 1X PERIOP  ONCE IRR  

Last administered on 10/3/17at 06:00;  Start 10/3/17 at 06:00;  Stop 10/3/17 at 

06:59;  Status DC


Heparin Sodium (Porcine) 39142 unit/Ringer's Solution 1,020 ml @  1,020 mls/hr 

1X PERIOP  ONCE IRR  Last administered on 10/3/17at 06:00;  Start 10/3/17 at 06:

00;  Stop 10/3/17 at 06:59;  Status DC


Potassium Chloride 70 meq/ Sodium Bicarbonate 12.5 meq/Lidocaine HCl 24 ml/

Parenteral Electrolytes 571.5 ml @  571.5 mls/ hr 1X PERIOP  ONCE IRR  Last 

administered on 10/3/17at 06:00;  Start 10/3/17 at 06:00;  Stop 10/3/17 at 06:59

;  Status DC


Potassium Chloride 15 meq/ Sodium Bicarbonate 12.5 meq/Parenteral Electrolytes 

520 ml @  520 mls/hr 1X PERIOP  ONCE IRR  Last administered on 10/3/17at 06:00;

  Start 10/3/17 at 06:00;  Stop 10/3/17 at 06:59;  Status DC


Midazolam HCl (Versed) 2 mg STK-MED ONCE .ROUTE ;  Start 10/2/17 at 13:27;  

Stop 10/2/17 at 13:28;  Status DC


Lidocaine HCl 20 ml STK-MED ONCE .ROUTE ;  Start 10/2/17 at 13:30;  Stop 10/2/

17 at 13:31;  Status DC


Heparin Sodium/ Sodium Chloride 500 ml @  As Directed STK-MED ONCE .ROUTE ;  

Start 10/2/17 at 13:30;  Stop 10/2/17 at 13:31;  Status DC


Heparin Sodium/ Sodium Chloride 1,000 unit 1X  ONCE IART  Last administered on 

10/2/17at 15:17;  Start 10/2/17 at 14:00;  Stop 10/2/17 at 14:01;  Status DC


Midazolam HCl (Versed) 2 mg 1X  ONCE IV  Last administered on 10/2/17at 15:23;  

Start 10/2/17 at 14:00;  Stop 10/2/17 at 14:01;  Status DC


Fentanyl Citrate (Fentanyl 2ml Vial) 100 mcg 1X  ONCE IV  Last administered on 

10/2/17at 15:24;  Start 10/2/17 at 14:00;  Stop 10/2/17 at 14:01;  Status DC


Lidocaine HCl 20 ml 1X  ONCE IJ  Last administered on 10/2/17at 15:16;  Start 10

/2/17 at 14:00;  Stop 10/2/17 at 14:01;  Status DC


Iohexol (Omnipaque 300 Mg/ml) 100 ml STK-MED ONCE .ROUTE ;  Start 10/2/17 at 14:

25;  Stop 10/2/17 at 14:26;  Status DC


Iohexol (Omnipaque 300 Mg/ml) 10 ml 1X  ONCE IART  Last administered on 10/2/

17at 15:17;  Start 10/2/17 at 14:45;  Stop 10/2/17 at 15:06;  Status DC


Info (Do NOT chart on this entry -- for MONITORING) 1 each PRN DAILY  PRN MC 

SEE COMMENTS;  Start 10/2/17 at 15:15;  Stop 10/4/17 at 15:14;  Status DC


Lidocaine HCl 20 ml STK-MED ONCE .ROUTE ;  Start 10/2/17 at 14:06;  Stop 10/2/

17 at 15:06;  Status DC


Cellulose 1 each STK-MED ONCE .ROUTE ;  Start 10/2/17 at 14:06;  Stop 10/2/17 

at 15:06;  Status DC


Protamine Sulfate 50 mg STK-MED ONCE IV ;  Start 10/2/17 at 14:06;  Stop 10/2/

17 at 15:06;  Status DC


Ondansetron HCl (Zofran) 4 mg PRN Q6HRS  PRN IV NAUSEA/VOMITING;  Start 10/2/17 

at 15:15;  Stop 10/3/17 at 15:14;  Status Cancel


Fentanyl Citrate (Fentanyl 2ml Vial) 25 mcg PRN Q5MIN  PRN IV MILD PAIN;  Start 

10/2/17 at 15:15;  Stop 10/3/17 at 15:14;  Status Cancel


Fentanyl Citrate (Fentanyl 2ml Vial) 50 mcg PRN Q5MIN  PRN IV MODERATE PAIN;  

Start 10/2/17 at 15:15;  Stop 10/3/17 at 15:14;  Status Cancel


Morphine Sulfate 1 mg PRN Q10MIN  PRN IV SEVERE PAIN;  Start 10/2/17 at 15:15;  

Stop 10/3/17 at 15:14;  Status Cancel


Ringer's Solution 1,000 ml @  30 mls/hr Q24H IV ;  Start 10/2/17 at 15:08;  

Stop 10/3/17 at 03:07;  Status DC


Lidocaine HCl (Xylocaine-Mpf 1% Vial) 2 ml 1X PRN  PRN ID IV START;  Start 10/2/

17 at 15:15;  Stop 10/3/17 at 15:14;  Status Cancel


Hydromorphone HCl (Dilaudid) 0.5 mg PRN Q10MIN  PRN IV SEV PAIN, Second choice;

  Start 10/2/17 at 15:15;  Stop 10/3/17 at 15:14;  Status Cancel


Prochlorperazine Edisylate (Compazine) 5 mg PACU PRN  PRN IV NAUSEA, MRX1;  

Start 10/2/17 at 15:15;  Stop 10/3/17 at 15:14;  Status Cancel


Heparin Sodium (Porcine) 5000 unit/Ringer's Solution 505 ml @  505 mls/hr 1X 

PERIOP  ONCE IRR  Last administered on 10/2/17at 16:44;  Start 10/2/17 at 15:30

;  Stop 10/2/17 at 16:29;  Status DC


Etomidate (Amidate) 20 mg STK-MED ONCE IV ;  Start 10/2/17 at 15:40;  Stop 10/2/

17 at 15:41;  Status DC


Rocuronium Bromide (Zemuron) 100 mg STK-MED ONCE .ROUTE ;  Start 10/2/17 at 15:

40;  Stop 10/2/17 at 15:41;  Status DC


Cefazolin Sodium/ Dextrose 50 ml @ As Directed STK-MED ONCE IV ;  Start 10/2/17 

at 16:12;  Stop 10/2/17 at 16:13;  Status DC


Lidocaine HCl (Lidocaine Pf 2% Vial) 5 ml STK-MED ONCE .ROUTE ;  Start 10/2/17 

at 16:14;  Stop 10/2/17 at 16:15;  Status DC


Heparin Sodium (Porcine) (Heparin Sodium) 10,000 unit STK-MED ONCE .ROUTE ;  

Start 10/2/17 at 16:30;  Stop 10/2/17 at 16:31;  Status DC


Desflurane (Suprane) 30 ml STK-MED ONCE IH ;  Start 10/2/17 at 16:46;  Stop 10/2

/17 at 16:47;  Status DC


Ondansetron HCl (Zofran) 4 mg STK-MED ONCE .ROUTE ;  Start 10/2/17 at 17:30;  

Stop 10/2/17 at 17:31;  Status DC


Neostigmine Methylsulfate (Bloxiverz) 10 mg STK-MED ONCE .ROUTE ;  Start 10/2/

17 at 17:30;  Stop 10/2/17 at 17:31;  Status DC


Glycopyrrolate (Robinul) 1 mg STK-MED ONCE .ROUTE ;  Start 10/2/17 at 17:30;  

Stop 10/2/17 at 17:31;  Status DC


Labetalol HCl (Normodyne) 10 mg 1X  ONCE IVP  Last administered on 10/2/17at 18:

31;  Start 10/2/17 at 18:30;  Stop 10/2/17 at 18:31;  Status DC


Labetalol HCl (Normodyne) 10 mg PRN Q4HRS  PRN IVP HYPERTENSION, SEE COMMENTS 

Last administered on 10/4/17at 04:59;  Start 10/2/17 at 18:45


Ondansetron HCl (Zofran) 4 mg PRN Q6HRS  PRN IV NAUSEA/VOMITING;  Start 10/4/17 

at 07:00;  Stop 10/5/17 at 06:59;  Status DC


Fentanyl Citrate (Fentanyl 2ml Vial) 25 mcg PRN Q5MIN  PRN IV MILD PAIN;  Start 

10/4/17 at 07:00;  Stop 10/5/17 at 06:59;  Status DC


Fentanyl Citrate (Fentanyl 2ml Vial) 50 mcg PRN Q5MIN  PRN IV MODERATE PAIN;  

Start 10/4/17 at 07:00;  Stop 10/5/17 at 06:59;  Status DC


Morphine Sulfate 1 mg PRN Q10MIN  PRN IV SEVERE PAIN;  Start 10/4/17 at 07:00;  

Stop 10/5/17 at 06:59;  Status DC


Ringer's Solution 1,000 ml @  30 mls/hr Q24H IV ;  Start 10/4/17 at 07:00;  

Stop 10/4/17 at 15:26;  Status DC


Lidocaine HCl (Xylocaine-Mpf 1% Vial) 2 ml PRN 1X  PRN ID IV START;  Start 10/4/

17 at 07:00;  Stop 10/5/17 at 06:59;  Status DC


Hydromorphone HCl (Dilaudid) 0.5 mg PRN Q10MIN  PRN IV SEV PAIN, Second choice;

  Start 10/4/17 at 07:00;  Stop 10/5/17 at 06:59;  Status DC


Prochlorperazine Edisylate (Compazine) 5 mg PACU PRN  PRN IV NAUSEA, MRX1;  

Start 10/4/17 at 07:00;  Stop 10/5/17 at 06:59;  Status DC


Cefazolin Sodium 1 gm/Sodium Chloride 500 ml @  500 mls/hr 1X PERIOP  ONCE IRR  

Last administered on 10/4/17at 08:51;  Start 10/4/17 at 06:00;  Stop 10/4/17 at 

06:59;  Status DC


Potassium Chloride 70 meq/ Sodium Bicarbonate 12.5 meq/Lidocaine HCl 24 ml/

Parenteral Electrolytes 571.5 ml @  571.5 mls/ hr 1X PERIOP  ONCE IRR  Last 

administered on 10/4/17at 06:00;  Start 10/4/17 at 06:00;  Stop 10/4/17 at 06:59

;  Status DC


Potassium Chloride 15 meq/ Sodium Bicarbonate 12.5 meq/Parenteral Electrolytes 

520 ml @  520 mls/hr 1X PERIOP  ONCE IRR  Last administered on 10/4/17at 06:00;

  Start 10/4/17 at 06:00;  Stop 10/4/17 at 06:59;  Status DC


Heparin Sodium (Porcine) 60644 unit/Ringer's Solution 1,020 ml @  1,020 mls/hr 

1X PERIOP  ONCE IRR  Last administered on 10/4/17at 08:51;  Start 10/4/17 at 06:

00;  Stop 10/4/17 at 06:59;  Status DC


Insulin Human Regular 150 unit/ Sodium Chloride 151.5 ml @  0 mls/hr CONT  PRN 

IV SEE I/O RECORD Last administered on 10/5/17at 04:40;  Start 10/4/17 at 00:00


Lidocaine HCl 20 ml STK-MED ONCE .ROUTE ;  Start 10/3/17 at 20:10;  Stop 10/3/

17 at 20:11;  Status DC


Heparin Sodium/ Sodium Chloride 500 ml @  As Directed STK-MED ONCE .ROUTE ;  

Start 10/3/17 at 20:10;  Stop 10/3/17 at 20:11;  Status DC


Midazolam HCl (Versed) 2 mg STK-MED ONCE .ROUTE ;  Start 10/3/17 at 20:32;  

Stop 10/3/17 at 20:33;  Status DC


Heparin Sodium/ Sodium Chloride 1,000 unit 1X  ONCE IART  Last administered on 

10/3/17at 20:55;  Start 10/3/17 at 20:45;  Stop 10/3/17 at 20:48;  Status DC


Midazolam HCl (Versed) 1 mg 1X  ONCE IV  Last administered on 10/3/17at 20:55;  

Start 10/3/17 at 20:45;  Stop 10/3/17 at 20:48;  Status DC


Fentanyl Citrate (Fentanyl 2ml Vial) 50 mcg 1X  ONCE IV ;  Start 10/3/17 at 20:

45;  Stop 10/3/17 at 20:48;  Status DC


Lidocaine HCl 20 ml 1X  ONCE IJ  Last administered on 10/3/17at 20:55;  Start 10

/3/17 at 20:45;  Stop 10/3/17 at 20:48;  Status DC


Etomidate (Amidate) 20 mg STK-MED ONCE IV ;  Start 10/4/17 at 06:32;  Stop 10/4/

17 at 06:33;  Status DC


Lidocaine HCl (Lidocaine Pf 2% Vial) 5 ml STK-MED ONCE .ROUTE ;  Start 10/4/17 

at 06:32;  Stop 10/4/17 at 06:33;  Status DC


Rocuronium Bromide (Zemuron) 100 mg STK-MED ONCE .ROUTE ;  Start 10/4/17 at 06:

32;  Stop 10/4/17 at 06:33;  Status DC


Ephedrine Sulfate (Akovaz) 50 mg STK-MED ONCE .ROUTE ;  Start 10/4/17 at 06:32;

  Stop 10/4/17 at 06:33;  Status DC


Phenylephrine HCl (Fabián-Synephrine Inj) 10 mg STK-MED ONCE .ROUTE ;  Start 10/4/

17 at 06:33;  Stop 10/4/17 at 06:34;  Status DC


Aminocaproic Acid (Amicar) 5,000 mg STK-MED ONCE IV ;  Start 10/4/17 at 06:33;  

Stop 10/4/17 at 06:34;  Status DC


Heparin Sodium (Porcine) 30,000 unit STK-MED ONCE .ROUTE ;  Start 10/4/17 at 06:

33;  Stop 10/4/17 at 06:34;  Status DC


Nitroglycerin/ Dextrose 250 ml @  As Directed STK-MED ONCE IV ;  Start 10/4/17 

at 06:34;  Stop 10/4/17 at 06:35;  Status DC


Midazolam HCl (Versed) 2 mg STK-MED ONCE .ROUTE ;  Start 10/4/17 at 06:35;  

Stop 10/4/17 at 06:36;  Status DC


Sufentanil Citrate (Sufenta) 100 mcg STK-MED ONCE .ROUTE ;  Start 10/4/17 at 06:

35;  Stop 10/4/17 at 06:36;  Status DC


Vancomycin HCl (Vanco) 10 gm STK-MED ONCE .ROUTE  Last administered on 10/4/

17at 08:51;  Start 10/4/17 at 06:22;  Stop 10/4/17 at 07:22;  Status DC


Cellulose 1 each STK-MED ONCE .ROUTE  Last administered on 10/4/17at 08:51;  

Start 10/4/17 at 06:22;  Stop 10/4/17 at 07:22;  Status DC


Papaverine HCl 60 mg STK-MED ONCE .ROUTE  Last administered on 10/4/17at 08:51;

  Start 10/4/17 at 06:22;  Stop 10/4/17 at 07:22;  Status DC


Aspirin (Aspirin) 300 mg STK-MED ONCE .ROUTE  Last administered on 10/4/17at 13:

20;  Start 10/4/17 at 06:22;  Stop 10/4/17 at 07:22;  Status DC


Sodium Chloride (Sodium Chloride) 50 ml STK-MED ONCE IJ  Last administered on 10

/4/17at 08:51;  Start 10/4/17 at 06:22;  Stop 10/4/17 at 07:23;  Status DC


Nicardipine HCl (Cardene) 25 mg STK-MED ONCE IV ;  Start 10/4/17 at 07:47;  

Stop 10/4/17 at 07:48;  Status DC


Isoflurane (Isoflurane) 90 ml STK-MED ONCE IH ;  Start 10/4/17 at 07:48;  Stop 

10/4/17 at 07:49;  Status DC


Heparin Sodium (Porcine) (Heparin Sodium) 10,000 unit STK-MED ONCE .ROUTE ;  

Start 10/4/17 at 08:43;  Stop 10/4/17 at 08:44;  Status DC


Heparin Sodium (Porcine) (Heparin Sodium) 10,000 unit STK-MED ONCE .ROUTE ;  

Start 10/4/17 at 08:46;  Stop 10/4/17 at 08:47;  Status DC


Rocuronium Bromide (Zemuron) 100 mg STK-MED ONCE .ROUTE ;  Start 10/4/17 at 08:

48;  Stop 10/4/17 at 08:49;  Status DC


Rocuronium Bromide (Zemuron) 100 mg STK-MED ONCE .ROUTE ;  Start 10/4/17 at 08:

48;  Stop 10/4/17 at 08:49;  Status DC


Rocuronium Bromide (Zemuron) 100 mg STK-MED ONCE .ROUTE ;  Start 10/4/17 at 08:

49;  Stop 10/4/17 at 08:50;  Status DC


Protamine Sulfate 50 mg STK-MED ONCE IV ;  Start 10/4/17 at 09:52;  Stop 10/4/

17 at 09:53;  Status DC


Protamine Sulfate 250 mg STK-MED ONCE IV ;  Start 10/4/17 at 09:52;  Stop 10/4/

17 at 09:53;  Status DC


Heparin Sodium (Porcine) 93449 unit/Sodium Chloride 1,020 ml @  1,020 mls/hr 1X

  ONCE IV ;  Start 10/4/17 at 10:30;  Stop 10/4/17 at 10:30;  Status DC


Dexamethasone Sodium Phosphate (Decadron) 20 mg STK-MED ONCE .ROUTE ;  Start 10/

4/17 at 10:20;  Stop 10/4/17 at 10:21;  Status DC


Heparin Sodium (Porcine) 77890 unit/Sodium Chloride 1,020 ml @  1,020 mls/hr 1X

  ONCE IRR  Last administered on 10/4/17at 10:30;  Start 10/4/17 at 10:30;  

Stop 10/4/17 at 11:29;  Status DC


Midazolam HCl (Versed) 2 mg STK-MED ONCE .ROUTE ;  Start 10/4/17 at 10:48;  

Stop 10/4/17 at 10:49;  Status DC


Dextrose (Dextrose 50%-Water Syringe) 25 gm STK-MED ONCE IV ;  Start 10/4/17 at 

12:18;  Stop 10/4/17 at 12:19;  Status DC


Sufentanil Citrate (Sufenta) 100 mcg STK-MED ONCE .ROUTE ;  Start 10/4/17 at 12:

24;  Stop 10/4/17 at 12:25;  Status DC


Heparin Sodium (Porcine) (Heparin Sodium) 10,000 unit STK-MED ONCE .ROUTE ;  

Start 10/4/17 at 12:37;  Stop 10/4/17 at 12:38;  Status DC


Heparin Sodium (Porcine) 30,000 unit STK-MED ONCE .ROUTE ;  Start 10/4/17 at 12:

37;  Stop 10/4/17 at 12:38;  Status DC


Calcium Chloride 1,000 mg STK-MED ONCE IV ;  Start 10/4/17 at 12:38;  Stop 10/4/

17 at 12:39;  Status DC


Magnesium Sulfate 5 gm STK-MED ONCE .ROUTE ;  Start 10/4/17 at 12:38;  Stop 10/4

/17 at 12:39;  Status DC


Mannitol (Mannitol) 12.5 g STK-MED ONCE .ROUTE ;  Start 10/4/17 at 12:38;  Stop 

10/4/17 at 12:39;  Status DC


Lidocaine HCl (Lidocaine Pf 2% Vial) 5 ml STK-MED ONCE .ROUTE ;  Start 10/4/17 

at 12:38;  Stop 10/4/17 at 12:39;  Status DC


Albumin Human 100 ml @  As Directed STK-MED ONCE IV ;  Start 10/4/17 at 12:39;  

Stop 10/4/17 at 12:40;  Status DC


Sodium Bicarbonate 50 meq STK-MED ONCE .ROUTE ;  Start 10/4/17 at 12:39;  Stop 

10/4/17 at 12:40;  Status DC


Nicardipine HCl 50 mg/Sodium Chloride 270 ml @ 0 mls/hr CONT  PRN IV SEE I/O 

RECORD Last administered on 10/5/17at 08:22;  Start 10/4/17 at 13:30


Sodium Chloride (Normal Saline Flush) 3 ml PRN Q12HR  PRN IV AFTER MEDS AND 

BLOOD DRAWS;  Start 10/4/17 at 13:45


Ringer's Solution 1,000 ml @  30 mls/hr Q24H IV  Last administered on 10/5/17at 

20:30;  Start 10/4/17 at 13:34;  Stop 10/7/17 at 10:24;  Status DC


Albumin Human 250 ml @  60 mls/hr PRN Q4HRS  PRN IV SEE I/O RECORD Last 

administered on 10/4/17at 18:25;  Start 10/4/17 at 13:45


Insulin Human Regular 150 unit/ Sodium Chloride 151.5 ml @  0 mls/hr CONT PRN  

PRN IV SEE I/O RECORD;  Start 10/4/17 at 13:45;  Stop 10/4/17 at 15:26;  Status 

DC


Dextrose (Dextrose 50%-Water Syringe) 25 gm PRN Q15MIN  PRN IV LOW BLOOD SUGAR;

  Start 10/4/17 at 13:45


Amiodarone HCl 150 mg/Dextrose 103 ml @  200 mls/hr ONCE  ONCE IV  Last 

administered on 10/4/17at 15:46;  Start 10/4/17 at 13:45;  Stop 10/4/17 at 14:15

;  Status DC


Amiodarone HCl 900 mg/Dextrose 518 ml @  33.33 mls/ hr CONT  PRN IV SEE I/O 

RECORD Last administered on 10/4/17at 15:46;  Start 10/4/17 at 13:45;  Stop 10/7

/17 at 08:34;  Status DC


Morphine Sulfate 2 mg PRN Q1HR  PRN IV PAIN Last administered on 10/4/17at 22:41

;  Start 10/4/17 at 13:45;  Stop 10/5/17 at 02:10;  Status DC


Acetaminophen (Tylenol) 650 mg PRN Q4HRS  PRN PO MILD PAIN / TEMP;  Start 10/4/

17 at 13:45


Acetaminophen (Acetaminophen Supp) 650 mg PRN Q4HRS  PRN AR MILD PAIN / TEMP;  

Start 10/4/17 at 13:45


Meperidine HCl (Demerol) 12.5 mg PRN Q15MIN  PRN IV SHIVERING;  Start 10/4/17 

at 13:45;  Stop 10/5/17 at 13:34;  Status DC


Propofol 100 ml @ 0 mls/hr CONT PRN  PRN IV POSTOP SEDATION UNTIL EXTUBATE;  

Start 10/4/17 at 13:45;  Stop 10/4/17 at 18:25;  Status DC


Senna/Docusate Sodium (Senna Plus) 1 tab BID PO  Last administered on 10/7/17at 

08:41;  Start 10/4/17 at 21:00


Bisacodyl (Dulcolax Supp) 10 mg PRN DAILY  PRN AR NO BOWEL MOVEMENT;  Start 10/4

/17 at 13:45


Aspirin (Ecotrin) 325 mg DAILYWBKFT PO  Last administered on 10/7/17at 08:38;  

Start 10/5/17 at 08:00


Aspirin (Aspirin) 300 mg PRN DAILY  PRN AR IF UNABLE TO TAKE PO;  Start 10/4/17 

at 13:45


Albuterol Sulfate (Ventolin Neb Soln) 2.5 mg PRN Q4HRS  PRN NEB SHORTNESS OF 

BREATH;  Start 10/4/17 at 13:45


Metoprolol Tartrate (Lopressor) 25 mg BID PO ;  Start 10/5/17 at 09:00;  Stop 10

/5/17 at 09:00;  Status DC


Nicardipine HCl 50 mg/Sodium Chloride 270 ml @ 0 mls/hr CONT PRN  PRN IV PER 

PROTOCOL;  Start 10/4/17 at 13:45;  Stop 10/4/17 at 15:26;  Status DC


Oxycodone/ Acetaminophen (Percocet 5/325) 1 tab PRN Q4HRS  PRN PO MILD PAIN;  

Start 10/4/17 at 13:45


Oxycodone/ Acetaminophen (Percocet 5/325) 2 tab PRN Q4HRS  PRN PO MODERATE PAIN

, SEVERE PAIN Last administered on 10/7/17at 12:28;  Start 10/4/17 at 13:45


Cefazolin Sodium/ Dextrose 50 ml @  100 mls/hr Q24H IV  Last administered on 10/

5/17at 12:00;  Start 10/5/17 at 08:00;  Stop 10/6/17 at 08:29;  Status DC


Famotidine (Pepcid) 20 mg QHS IVP  Last administered on 10/5/17at 20:56;  Start 

10/4/17 at 21:00;  Stop 10/6/17 at 13:52;  Status DC


Polyethylene Glycol (miraLAX PACKET) 17 gm HS PO ;  Start 10/5/17 at 21:00


Nitroglycerin/ Dextrose 250 ml @ 0 mls/hr CONT  PRN IV SEE I/O RECORD Last 

administered on 10/5/17at 06:51;  Start 10/4/17 at 15:15


Sodium Bicarbonate 50 meq 1X  ONCE IV  Last administered on 10/4/17at 15:40;  

Start 10/4/17 at 15:30;  Stop 10/4/17 at 15:31;  Status DC


Sodium Bicarbonate (Sodium Bicarbonate) 650 mg TIDWMEALS PO  Last administered 

on 10/7/17at 12:27;  Start 10/5/17 at 08:00


Sodium Bicarbonate 50 meq 1X  ONCE IV  Last administered on 10/4/17at 18:40;  

Start 10/4/17 at 19:00;  Stop 10/4/17 at 19:01;  Status DC


Metoclopramide HCl (Reglan) 10 mg 1X  ONCE IV ;  Start 10/4/17 at 19:15;  Stop 

10/4/17 at 19:15;  Status DC


Citric Acid/ Sodium Citrate (Bicitra) 30 ml 1X  ONCE PO ;  Start 10/4/17 at 19:

15;  Stop 10/4/17 at 19:15;  Status DC


Morphine Sulfate 2 mg PRN Q1HR  PRN IV PAIN Last administered on 10/5/17at 12:10

;  Start 10/5/17 at 02:15


Metoprolol Tartrate (Lopressor) 50 mg BID PO  Last administered on 10/7/17at 08:

42;  Start 10/5/17 at 09:00


Sodium Chloride 1,000 ml @  1,000 mls/hr Q1H PRN IV hypotension;  Start 10/5/17 

at 07:55;  Stop 10/5/17 at 13:54;  Status DC


Diphenhydramine HCl (Benadryl) 25 mg 1X PRN  PRN IV ITCHING;  Start 10/5/17 at 

08:00;  Stop 10/6/17 at 07:59;  Status DC


Diphenhydramine HCl (Benadryl) 25 mg 1X PRN  PRN IV ITCHING;  Start 10/5/17 at 

08:00;  Stop 10/6/17 at 07:59;  Status DC


Sodium Chloride 1,000 ml @  400 mls/hr Q2H30M PRN IV PATENCY;  Start 10/5/17 at 

07:55;  Stop 10/5/17 at 19:54;  Status DC


Info (PHARMACY MONITORING -- do not chart) 1 each PRN DAILY  PRN MC SEE COMMENTS

;  Start 10/5/17 at 08:00;  Status UNV


Info (PHARMACY MONITORING -- do not chart) 1 each PRN DAILY  PRN MC SEE COMMENTS

;  Start 10/5/17 at 08:00;  Status UNV


Ondansetron HCl (Zofran) 4 mg STK-MED ONCE .ROUTE ;  Start 10/5/17 at 12:08;  

Stop 10/5/17 at 12:09;  Status DC


Amiodarone HCl (Cordarone) 200 mg BID PO  Last administered on 10/7/17at 08:42;

  Start 10/5/17 at 21:00


Insulin Detemir (Levemir) 40 units QHS SQ  Last administered on 10/5/17at 21:03

;  Start 10/5/17 at 21:00;  Stop 10/6/17 at 08:56;  Status DC


Insulin Aspart (NovoLOG) 0-9 UNITS TIDWMEALS SQ  Last administered on 10/7/17at 

12:29;  Start 10/5/17 at 17:00


Dextrose (Dextrose 50%-Water Syringe) 12.5 gm PRN Q15MIN  PRN IV SEE COMMENTS;  

Start 10/5/17 at 15:30


Sodium Chloride 1,000 ml @  1,000 mls/hr Q1H PRN IV hypotension;  Start 10/6/17 

at 08:18;  Stop 10/6/17 at 16:00;  Status DC


Sodium Chloride 1,000 ml @  400 mls/hr Q2H30M PRN IV PATENCY;  Start 10/6/17 at 

08:18;  Stop 10/6/17 at 16:00;  Status DC


Info (PHARMACY MONITORING -- do not chart) 1 each PRN DAILY  PRN MC SEE COMMENTS

;  Start 10/6/17 at 08:30;  Status UNV


Info (PHARMACY MONITORING -- do not chart) 1 each PRN DAILY  PRN MC SEE COMMENTS

;  Start 10/6/17 at 08:30;  Status UNV


Insulin Detemir (Levemir) 30 units BID66 SQ  Last administered on 10/7/17at 06:

21;  Start 10/6/17 at 09:00


Ondansetron HCl (Zofran) 4 mg STK-MED ONCE .ROUTE ;  Start 10/5/17 at 13:00;  

Stop 10/6/17 at 09:34;  Status DC


Famotidine (Pepcid) 20 mg Q48H IVP  Last administered on 10/6/17at 21:14;  

Start 10/6/17 at 21:00


Ondansetron HCl (Zofran) 4 mg STK-MED ONCE .ROUTE ;  Start 10/6/17 at 15:19;  

Stop 10/6/17 at 15:20;  Status DC


Darbepoetin Benny (Aranesp) 100 mcg Th SQ ;  Start 10/12/17 at 21:00





Active Scripts


Active


Sodium Bicarbonate 650 Mg Tablet 650 Mg PO TID


Cozaar (Losartan Potassium) 50 Mg Tablet 100 Mg PO DAILY08


Gabapentin 300 Mg Capsule 300 Mg PO DAILY


Reported


Isosorbide Mononitrate 20 Mg Tablet 60 Mg PO DAILY


Clopidogrel (Clopidogrel Bisulfate) 75 Mg Tablet 1 Tab PO DAILY


Aspirin 325 Mg Tablet 1 Tab PO DAILY


Ranexa (Ranolazine) 500 Mg Tab.er.12h 1 Tab PO BID


Humulin N Kwikpen (Nph, Human Insulin Isophane) 100 Unit/1 Ml Insuln.pen 40 

Unit SQ BIDAC


Percocet  Mg Tablet (Oxycodone/Acetaminophen) 1 Each Tablet 1 Tab PO QID


Amlodipine Besylate 10 Mg Tablet 10 Mg PO DAILY


Ventolin Hfa Inhaler (Albuterol Sulfate) 18 Gm Hfa.aer.ad 2 Puff INH Q4HRS PRN


Carvedilol 25 Mg Tablet 25 Mg PO BID


Vitals/I & O





Vital Sign - Last 24 Hours








 10/6/17 10/6/17 10/6/17 10/6/17





 15:24 15:25 15:25 15:26


 


Pulse  96 96 


 


B/P (MAP)  161/76 161/76 


 


Pulse Ox 94   94


 


O2 Delivery Nasal Cannula   Room Air





 10/6/17 10/6/17 10/6/17 10/6/17





 16:00 19:33 20:00 21:13


 


Temp 98.5  97.7 





 98.5  97.7 


 


Pulse 93  96 73


 


Resp 27 24 17 


 


B/P (MAP) 143/76 (98)  146/77 (100) 146/77


 


O2 Delivery Room Air Room Air Room Air 


 


    





    





 10/6/17 10/7/17 10/7/17 10/7/17





 21:13 00:00 04:00 04:04


 


Temp  98.2 99.0 





  98.2 99.0 


 


Pulse 73 77 96 


 


Resp  17 20 24


 


B/P (MAP) 146/77 135/61 (85) 170/76 (107) 


 


Pulse Ox  96 94 


 


O2 Delivery  Room Air Room Air Room Air


 


    





    





 10/7/17 10/7/17 10/7/17 10/7/17





 07:30 07:30 08:39 08:42


 


Temp 98.6   





 98.6   


 


Pulse 82   95


 


Resp 20  18 


 


B/P (MAP) 117/89 (98)   180/81


 


Pulse Ox 93  96 


 


O2 Delivery Room Air Room Air Nasal Cannula 


 


    





    





 10/7/17 10/7/17 10/7/17 10/7/17





 08:42 09:39 12:00 12:28


 


Temp   97.7 





   97.7 


 


Pulse 95  74 


 


Resp  18 18 18


 


B/P (MAP) 180/81  135/76 (95) 


 


Pulse Ox  96 98 96


 


O2 Delivery  Nasal Cannula Nasal Cannula Nasal Cannula


 


O2 Flow Rate  2.0 2.0 2.0














Intake and Output   


 


 10/7/17 10/7/17 10/8/17





 15:00 23:00 07:00


 


Intake Total 360 ml  


 


Output Total 200 ml  


 


Balance 160 ml  

















DAMIAN ONEAL MD Oct 7, 2017 14:51

## 2017-10-07 NOTE — PDOC
GENERAL


General:


vss and afebrile. awake and alert and niece visiting. has walked today. chest 

decent bs and cxr ok. heart regular. abdomen benign. left BKA. Hb 7.1 yesterday 

with plans for transfusion next dialysis if needed. blood pressures elevated at 

times and has prn meds for same. sugars fair. continue same.


Problems:  





VITAL SIGNS


Vital Signs:





Vital Signs








  Date Time  Temp Pulse Resp B/P (MAP) Pulse Ox O2 Delivery O2 Flow Rate FiO2


 


10/7/17 09:39   18  96 Nasal Cannula 2.0 


 


10/7/17 08:42  95  180/81    


 


10/7/17 07:30 98.6       





 98.6       











I & O


I & O











Intake and Output 


 


 10/8/17





 07:00


 


Output Total 200 ml


 


Balance -200 ml


 


 


 


Output Urine Total 200 ml











ALLERGIES


Allergies:





Allergies








Coded Allergies Type Severity Reaction Last Updated Verified


 


  No Known Drug Allergies    10/4/17 No











MEDS


Medications:





Current Medications








 Medications


  (Trade)  Dose


 Ordered  Sig/Yisel  Start Time


 Stop Time Status Last Admin


Dose Admin


 


 Acetaminophen


  (Acetaminophen


 Supp)  650 mg  PRN Q4HRS  PRN  10/4/17 13:45


     


 


 


 Acetaminophen


  (Tylenol)  650 mg  PRN Q4HRS  PRN  10/4/17 13:45


     


 


 


 Albumin Human  250 ml @ 


 60 mls/hr  PRN Q4HRS  PRN  10/4/17 13:45


    10/4/17 18:25


60 MLS/HR


 


 Albuterol Sulfate


  (Ventolin Neb


 Soln)  2.5 mg  PRN Q4HRS  PRN  10/4/17 13:45


     


 


 


 Aminocaproic Acid


  (Amicar)  5,000 mg  STK-MED ONCE  10/4/17 06:33


 10/4/17 06:34 DC  


 


 


 Amiodarone HCl


  (Cordarone)  200 mg  BID  10/5/17 21:00


    10/7/17 08:42


200 MG


 


 Amiodarone HCl


 150 mg/Dextrose  103 ml @ 


 200 mls/hr  ONCE  ONCE  10/4/17 13:45


 10/4/17 14:15 DC 10/4/17 15:46


200 MLS/HR


 


 Amiodarone HCl


 900 mg/Dextrose  518 ml @ 


 33.33 mls/


 hr  CONT  PRN  10/4/17 13:45


 10/7/17 08:34 DC 10/4/17 15:46


33.33 MLS/HR


 


 Amlodipine


 Besylate


  (Norvasc)  10 mg  DAILY  9/28/17 11:00


 10/4/17 14:06 DC 10/3/17 08:48


10 MG


 


 Aspirin


  (Aspirin)  300 mg  PRN DAILY  PRN  10/4/17 13:45


     


 


 


 Aspirin


  (Myles Aspirin)  325 mg  DAILY  9/28/17 11:00


 9/28/17 11:00 DC  


 


 


 Aspirin


  (Ecotrin)  325 mg  DAILYWBKFT  10/5/17 08:00


    10/7/17 08:38


325 MG


 


 Bisacodyl


  (Dulcolax Supp)  10 mg  PRN DAILY  PRN  10/4/17 13:45


     


 


 


 Calcium Chloride  1,000 mg  STK-MED ONCE  10/4/17 12:38


 10/4/17 12:39 DC  


 


 


 Cefazolin Sodium


 1 gm/Sodium


 Chloride  500 ml @ 


 500 mls/hr  1X PERIOP  ONCE  10/4/17 06:00


 10/4/17 06:59 DC 10/4/17 08:51


 


 


 Cefazolin Sodium/


 Dextrose  50 ml @ 


 100 mls/hr  Q24H  10/5/17 08:00


 10/6/17 08:29 DC 10/5/17 12:00


100 MLS/HR


 


 Cellulose  1 each  STK-MED ONCE  10/4/17 06:22


 10/4/17 07:22 DC 10/4/17 08:51


1 EACH


 


 Citric Acid/


 Sodium Citrate


  (Bicitra)  30 ml  1X  ONCE  10/4/17 19:15


 10/4/17 19:15 DC  


 


 


 Clopidogrel


 Bisulfate


  (Plavix)  75 mg  DAILY  9/28/17 11:00


 9/28/17 11:00 DC  


 


 


 Cyclobenzaprine


 HCl


  (Flexeril)  10 mg  PRN Q6HRS  PRN  9/27/17 16:45


    10/5/17 23:02


10 MG


 


 Darbepoetin Benny


  (Aranesp)  100 mcg  Th  10/12/17 21:00


     


 


 


 Desflurane


  (Suprane)  30 ml  STK-MED ONCE  10/2/17 16:46


 10/2/17 16:47 DC  


 


 


 Dexamethasone


 Sodium Phosphate


  (Decadron)  20 mg  STK-MED ONCE  10/4/17 10:20


 10/4/17 10:21 DC  


 


 


 Dextrose


  (Dextrose


 50%-Water Syringe)  12.5 gm  PRN Q15MIN  PRN  10/5/17 15:30


     


 


 


 Diazepam


  (Valium)  10 mg  PRN Q6HRS  PRN  10/1/17 12:30


 10/4/17 14:07 DC 10/3/17 15:31


10 MG


 


 Diphenhydramine


 HCl


  (Benadryl)  25 mg  1X PRN  PRN  10/5/17 08:00


 10/6/17 07:59 DC  


 


 


 Ephedrine Sulfate


  (Akovaz)  50 mg  STK-MED ONCE  10/4/17 06:32


 10/4/17 06:33 DC  


 


 


 Etomidate


  (Amidate)  20 mg  STK-MED ONCE  10/4/17 06:32


 10/4/17 06:33 DC  


 


 


 Famotidine


  (Pepcid)  20 mg  Q48H  10/6/17 21:00


    10/6/17 21:14


20 MG


 


 Fentanyl Citrate


  (Fentanyl 2ml


 Vial)  50 mcg  1X  ONCE  10/3/17 20:45


 10/3/17 20:48 DC  


 


 


 Glycopyrrolate


  (Robinul)  1 mg  STK-MED ONCE  10/2/17 17:30


 10/2/17 17:31 DC  


 


 


 Heparin Sodium


  (Porcine)  30,000 unit  STK-MED ONCE  10/4/17 12:37


 10/4/17 12:38 DC  


 


 


 Heparin Sodium


  (Porcine)


  (Heparin 5,000


 Units/1,000ml NS)  5,000 unit  1X  ONCE  9/27/17 13:00


 9/27/17 13:47 DC 9/27/17 13:00


5,000 UNIT


 


 Heparin Sodium


  (Porcine)


  (Heparin Sodium)  10,000 unit  STK-MED ONCE  10/4/17 12:37


 10/4/17 12:38 DC  


 


 


 Heparin Sodium


  (Porcine) [id number]


 unit/Ringer's


 Solution  1,020 ml @ 


 1,020 mls/hr  1X PERIOP  ONCE  10/4/17 06:00


 10/4/17 06:59 DC 10/4/17 08:51


 


 


 Heparin Sodium


  (Porcine) [id number]


 unit/Sodium


 Chloride  1,020 ml @ 


 1,020 mls/hr  1X  ONCE  10/4/17 10:30


 10/4/17 11:29 DC 10/4/17 10:30


1,020 MLS/HR


 


 Heparin Sodium


  (Porcine) 5000


 unit/Ringer's


 Solution  505 ml @ 


 505 mls/hr  1X PERIOP  ONCE  10/2/17 15:30


 10/2/17 16:29 DC 10/2/17 16:44


 


 


 Heparin Sodium/


 Dextrose  500 ml @ 


 20 mls/hr  CONT PRN  PRN  9/27/17 13:00


 9/28/17 09:04 DC 9/27/17 13:45


20 MLS/HR


 


 Heparin Sodium/


 Sodium Chloride  1,000 unit  1X  ONCE  10/3/17 20:45


 10/3/17 20:48 DC 10/3/17 20:55


1,000 UNIT


 


 Hydromorphone HCl


  (Dilaudid)  0.5 mg  PRN Q10MIN  PRN  10/4/17 07:00


 10/5/17 06:59 DC  


 


 


 Info


  (Do NOT chart on


 this entry -- for


 MONITORING)  1 each  PRN DAILY  PRN  10/2/17 15:15


 10/4/17 15:14 DC  


 


 


 Info


  (PHARMACY


 MONITORING -- do


 not chart)  1 each  PRN DAILY  PRN  10/6/17 08:30


   UNV  


 


 


 Insulin Aspart


  (NovoLOG)  0-9 UNITS  TIDWMEALS  10/5/17 17:00


    10/6/17 17:23


9 UNITS


 


 Insulin Detemir


  (Levemir)  30 units  BID66  10/6/17 09:00


    10/7/17 06:21


30 UNITS


 


 Insulin Human


 Regular 150 unit/


 Sodium Chloride  151.5 ml @ 


 0 mls/hr  CONT PRN  PRN  10/4/17 13:45


 10/4/17 15:26 DC  


 


 


 Iodixanol


  (Visipaque 320)  100 ml  1X  ONCE  9/27/17 13:00


 9/27/17 13:47 DC 9/27/17 13:40


72 ML


 


 Iohexol


  (Omnipaque 300


 Mg/ml)  10 ml  1X  ONCE  10/2/17 14:45


 10/2/17 15:06 DC 10/2/17 15:17


10 ML


 


 Isoflurane


  (Isoflurane)  90 ml  STK-MED ONCE  10/4/17 07:48


 10/4/17 07:49 DC  


 


 


 Isosorbide


 Mononitrate


  (Imdur)  30 mg  DAILY  9/28/17 11:00


 10/4/17 14:06 DC 10/3/17 08:48


30 MG


 


 Labetalol HCl


  (Normodyne)  10 mg  PRN Q4HRS  PRN  10/2/17 18:45


    10/4/17 04:59


10 MG


 


 Lidocaine HCl


  (Lidocaine Pf 2%


 Vial)  5 ml  STK-MED ONCE  10/4/17 12:38


 10/4/17 12:39 DC  


 


 


 Lidocaine HCl


  (Xylocaine-Mpf


 1% Vial)  2 ml  PRN 1X  PRN  10/4/17 07:00


 10/5/17 06:59 DC  


 


 


 Losartan Potassium


  (Cozaar)  100 mg  DAILY08  9/28/17 11:00


 9/28/17 11:00 DC  


 


 


 Magnesium Sulfate  5 gm  STK-MED ONCE  10/4/17 12:38


 10/4/17 12:39 DC  


 


 


 Mannitol


  (Mannitol)  12.5 g  STK-MED ONCE  10/4/17 12:38


 10/4/17 12:39 DC  


 


 


 Meperidine HCl


  (Demerol)  12.5 mg  PRN Q15MIN  PRN  10/4/17 13:45


 10/5/17 13:34 DC  


 


 


 Metoclopramide HCl


  (Reglan)  10 mg  1X  ONCE  10/4/17 19:15


 10/4/17 19:15 DC  


 


 


 Metoprolol


 Tartrate


  (Lopressor)  50 mg  BID  10/5/17 09:00


    10/7/17 08:42


50 MG


 


 Midazolam HCl


  (Versed)  2 mg  STK-MED ONCE  10/4/17 10:48


 10/4/17 10:49 DC  


 


 


 Morphine Sulfate  2 mg  PRN Q1HR  PRN  10/5/17 02:15


    10/5/17 12:10


2 MG


 


 Neostigmine


 Methylsulfate


  (Bloxiverz)  10 mg  STK-MED ONCE  10/2/17 17:30


 10/2/17 17:31 DC  


 


 


 Nicardipine HCl


  (Cardene)  25 mg  STK-MED ONCE  10/4/17 07:47


 10/4/17 07:48 DC  


 


 


 Nicardipine HCl


 50 mg/Sodium


 Chloride  270 ml @ 0


 mls/hr  CONT PRN  PRN  10/4/17 13:45


 10/4/17 15:26 DC  


 


 


 Nitroglycerin/


 Dextrose  250 ml @ 0


 mls/hr  CONT  PRN  10/4/17 15:15


    10/5/17 06:51


15 MLS/HR


 


 Ondansetron HCl


  (Zofran)  4 mg  STK-MED ONCE  10/6/17 15:19


 10/6/17 15:20 DC  


 


 


 Oxycodone/


 Acetaminophen


  (Percocet 10/325)  1 tab  PRN Q6HRS  PRN  9/28/17 10:30


 10/4/17 18:25 DC 10/4/17 00:47


1 TAB


 


 Oxycodone/


 Acetaminophen


  (Percocet 5/325)  2 tab  PRN Q4HRS  PRN  10/4/17 13:45


    10/7/17 08:39


2 TAB


 


 Papaverine HCl  60 mg  STK-MED ONCE  10/4/17 06:22


 10/4/17 07:22 DC 10/4/17 08:51


60 MG


 


 Phenylephrine HCl


  (Fabián-Synephrine


 Inj)  10 mg  STK-MED ONCE  10/4/17 06:33


 10/4/17 06:34 DC  


 


 


 Polyethylene


 Glycol


  (miraLAX PACKET)  17 gm  HS  10/5/17 21:00


     


 


 


 Potassium


 Chloride 15 meq/


 Sodium


 Bicarbonate 12.5


 meq/Parenteral


 Electrolytes  520 ml @ 


 520 mls/hr  1X PERIOP  ONCE  10/4/17 06:00


 10/4/17 06:59 DC 10/4/17 06:00


520 MLS/HR


 


 Potassium


 Chloride 70 meq/


 Sodium


 Bicarbonate 12.5


 meq/Lidocaine HCl


 24 ml/Parenteral


 Electrolytes  571.5 ml @ 


 571.5 mls/


 hr  1X PERIOP  ONCE  10/4/17 06:00


 10/4/17 06:59 DC 10/4/17 06:00


571.5 MLS/HR


 


 Prochlorperazine


 Edisylate


  (Compazine)  5 mg  PACU PRN  PRN  10/4/17 07:00


 10/5/17 06:59 DC  


 


 


 Propofol  100 ml @ 0


 mls/hr  CONT PRN  PRN  10/4/17 13:45


 10/4/17 18:25 DC  


 


 


 Protamine Sulfate  250 mg  STK-MED ONCE  10/4/17 09:52


 10/4/17 09:53 DC  


 


 


 Ranolazine


  (Ranexa)  500 mg  BID  9/28/17 11:00


 10/4/17 14:06 DC 10/3/17 22:14


500 MG


 


 Ringer's Solution  1,000 ml @ 


 30 mls/hr  Q24H  10/4/17 13:34


 10/7/17 10:24 DC 10/5/17 20:30


30 MLS/HR


 


 Rocuronium Bromide


  (Zemuron)  100 mg  STK-MED ONCE  10/4/17 08:49


 10/4/17 08:50 DC  


 


 


 Senna/Docusate


 Sodium


  (Senna Plus)  1 tab  BID  10/4/17 21:00


    10/7/17 08:41


1 TAB


 


 Sodium Bicarbonate  50 meq  1X  ONCE  10/4/17 19:00


 10/4/17 19:01 DC 10/4/17 18:40


50 MEQ


 


 Sodium Bicarbonate


  (Sodium


 Bicarbonate)  650 mg  TIDWMEALS  10/5/17 08:00


    10/7/17 08:39


650 MG


 


 Sodium Chloride  1,000 ml @ 


 400 mls/hr  Q2H30M PRN  10/6/17 08:18


 10/6/17 16:00 DC  


 


 


 Sodium Chloride


  (Normal Saline


 Flush)  3 ml  PRN Q12HR  PRN  10/4/17 13:45


     


 


 


 Sodium Chloride


  (Sodium Chloride)  50 ml  STK-MED ONCE  10/4/17 06:22


 10/4/17 07:23 DC 10/4/17 08:51


50 ML


 


 Sufentanil Citrate


  (Sufenta)  100 mcg  STK-MED ONCE  10/4/17 12:24


 10/4/17 12:25 DC  


 


 


 Vancomycin HCl


  (Vanco)  10 gm  STK-MED ONCE  10/4/17 06:22


 10/4/17 07:22 DC 10/4/17 08:51


10 GM


 


 Zolpidem Tartrate


  (Ambien)  5 mg  PRN QHS  PRN  9/28/17 09:30


    10/5/17 22:56


5 MG











LAB


Lab:





Laboratory Tests








Test


  10/6/17


12:46 10/6/17


17:18 10/7/17


06:18 10/7/17


08:22


 


Glucose (Fingerstick)


  135 mg/dL


(70-99) 339 mg/dL


(70-99) 184 mg/dL


(70-99) 150 mg/dL


(70-99)

















JEFFERY VANG MD Oct 7, 2017 11:09

## 2017-10-07 NOTE — PDOC
SUBJECTIVE


ROS


F/up for ESRD





doing OK





CVS:   no Orthopnea, post-op CP


RESP:   no SOB, no MORALES


GI:   no Nausea, no Vomiting


:   no Dysuria, no Urgency





OBJECTIVE


Vital Signs





Vital Signs








  Date Time  Temp Pulse Resp B/P (MAP) Pulse Ox O2 Delivery O2 Flow Rate FiO2


 


10/7/17 08:42  95  180/81    


 


10/7/17 08:39   18  96 Nasal Cannula  


 


10/7/17 07:30 98.6       





 98.6       








I & 0











Intake and Output 


 


 10/8/17





 06:59


 


Output Total 200 ml


 


Balance -200 ml


 


 


 


Output Urine Total 200 ml











PHYSICAL EXAM


Physical Exam


GEN:    Awake, Oriented x 1-2 , In no distress


EYES:  Vision Unchanged, Conjunctiva Normal


EN:      No EN Drainage, Mucous Membranes moist


NECK:  no JVD, no JVP, Supple, no Thyromegaly


CVS:    S1S2, no Murmur, No Gallop, No Rub,no Edema


RESP:  Few Rales, no Rhonchi,no Acc. Muscle Use


GI:        BS + ve, NO Bruit, Non Tender, Non Distended


:      no CVA tenderness, no Suprapubic Tenderness





DIAGNOSIS/ASSESSMENT


Assessment & Plan


ESRD:      Current fluid and E-lyte status does not necessitate emergent need 

for dialysis.  Will re-evaluate for dialysis in the am and continue on MWF 

schedule.





ANEMIA;    Aranesp as ordered,  Transfuse  with next HD as needed





HTN:   Current BP meds as reviewed.  on numerous prn meds watch trend, resume 

home meds once able to take PO





BONE & MINERAL:   follow phos and alter   binders prn





CAD - S/p CABG





Discussed Plan of Care with pt at bedside


Problems:  





COMMENT/RELEVANT DATA


Meds





Current Medications








 Medications


  (Trade)  Dose


 Ordered  Sig/Yisel  Start Time


 Stop Time Status Last Admin


Dose Admin


 


 Acetaminophen


  (Acetaminophen


 Supp)  650 mg  PRN Q4HRS  PRN  10/4/17 13:45


     


 


 


 Acetaminophen


  (Tylenol)  650 mg  PRN Q4HRS  PRN  10/4/17 13:45


     


 


 


 Albumin Human  250 ml @ 


 60 mls/hr  PRN Q4HRS  PRN  10/4/17 13:45


    10/4/17 18:25


60 MLS/HR


 


 Albuterol Sulfate


  (Ventolin Neb


 Soln)  2.5 mg  PRN Q4HRS  PRN  10/4/17 13:45


     


 


 


 Aminocaproic Acid


  (Amicar)  5,000 mg  STK-MED ONCE  10/4/17 06:33


 10/4/17 06:34 DC  


 


 


 Amiodarone HCl


  (Cordarone)  200 mg  BID  10/5/17 21:00


    10/7/17 08:42


200 MG


 


 Amiodarone HCl


 150 mg/Dextrose  103 ml @ 


 200 mls/hr  ONCE  ONCE  10/4/17 13:45


 10/4/17 14:15 DC 10/4/17 15:46


200 MLS/HR


 


 Amiodarone HCl


 900 mg/Dextrose  518 ml @ 


 33.33 mls/


 hr  CONT  PRN  10/4/17 13:45


 10/7/17 08:34 DC 10/4/17 15:46


33.33 MLS/HR


 


 Amlodipine


 Besylate


  (Norvasc)  10 mg  DAILY  9/28/17 11:00


 10/4/17 14:06 DC 10/3/17 08:48


10 MG


 


 Aspirin


  (Aspirin)  300 mg  PRN DAILY  PRN  10/4/17 13:45


     


 


 


 Aspirin


  (Myles Aspirin)  325 mg  DAILY  9/28/17 11:00


 9/28/17 11:00 DC  


 


 


 Aspirin


  (Ecotrin)  325 mg  DAILYWBKFT  10/5/17 08:00


    10/7/17 08:38


325 MG


 


 Bisacodyl


  (Dulcolax Supp)  10 mg  PRN DAILY  PRN  10/4/17 13:45


     


 


 


 Calcium Chloride  1,000 mg  STK-MED ONCE  10/4/17 12:38


 10/4/17 12:39 DC  


 


 


 Cefazolin Sodium


 1 gm/Sodium


 Chloride  500 ml @ 


 500 mls/hr  1X PERIOP  ONCE  10/4/17 06:00


 10/4/17 06:59 DC 10/4/17 08:51


 


 


 Cefazolin Sodium/


 Dextrose  50 ml @ 


 100 mls/hr  Q24H  10/5/17 08:00


 10/6/17 08:29 DC 10/5/17 12:00


100 MLS/HR


 


 Cellulose  1 each  STK-MED ONCE  10/4/17 06:22


 10/4/17 07:22 DC 10/4/17 08:51


1 EACH


 


 Citric Acid/


 Sodium Citrate


  (Bicitra)  30 ml  1X  ONCE  10/4/17 19:15


 10/4/17 19:15 DC  


 


 


 Clopidogrel


 Bisulfate


  (Plavix)  75 mg  DAILY  9/28/17 11:00


 9/28/17 11:00 DC  


 


 


 Cyclobenzaprine


 HCl


  (Flexeril)  10 mg  PRN Q6HRS  PRN  9/27/17 16:45


    10/5/17 23:02


10 MG


 


 Darbepoetin Benny


  (Aranesp)  60 mcg  Th  9/28/17 21:00


    10/5/17 20:56


60 MCG


 


 Desflurane


  (Suprane)  30 ml  STK-MED ONCE  10/2/17 16:46


 10/2/17 16:47 DC  


 


 


 Dexamethasone


 Sodium Phosphate


  (Decadron)  20 mg  STK-MED ONCE  10/4/17 10:20


 10/4/17 10:21 DC  


 


 


 Dextrose


  (Dextrose


 50%-Water Syringe)  12.5 gm  PRN Q15MIN  PRN  10/5/17 15:30


     


 


 


 Diazepam


  (Valium)  10 mg  PRN Q6HRS  PRN  10/1/17 12:30


 10/4/17 14:07 DC 10/3/17 15:31


10 MG


 


 Diphenhydramine


 HCl


  (Benadryl)  25 mg  1X PRN  PRN  10/5/17 08:00


 10/6/17 07:59 DC  


 


 


 Ephedrine Sulfate


  (Akovaz)  50 mg  STK-MED ONCE  10/4/17 06:32


 10/4/17 06:33 DC  


 


 


 Etomidate


  (Amidate)  20 mg  STK-MED ONCE  10/4/17 06:32


 10/4/17 06:33 DC  


 


 


 Famotidine


  (Pepcid)  20 mg  Q48H  10/6/17 21:00


    10/6/17 21:14


20 MG


 


 Fentanyl Citrate


  (Fentanyl 2ml


 Vial)  50 mcg  1X  ONCE  10/3/17 20:45


 10/3/17 20:48 DC  


 


 


 Glycopyrrolate


  (Robinul)  1 mg  STK-MED ONCE  10/2/17 17:30


 10/2/17 17:31 DC  


 


 


 Heparin Sodium


  (Porcine)  30,000 unit  STK-MED ONCE  10/4/17 12:37


 10/4/17 12:38 DC  


 


 


 Heparin Sodium


  (Porcine)


  (Heparin 5,000


 Units/1,000ml NS)  5,000 unit  1X  ONCE  9/27/17 13:00


 9/27/17 13:47 DC 9/27/17 13:00


5,000 UNIT


 


 Heparin Sodium


  (Porcine)


  (Heparin Sodium)  10,000 unit  STK-MED ONCE  10/4/17 12:37


 10/4/17 12:38 DC  


 


 


 Heparin Sodium


  (Porcine) 85246


 unit/Ringer's


 Solution  1,020 ml @ 


 1,020 mls/hr  1X PERIOP  ONCE  10/4/17 06:00


 10/4/17 06:59 DC 10/4/17 08:51


 


 


 Heparin Sodium


  (Porcine) 34144


 unit/Sodium


 Chloride  1,020 ml @ 


 1,020 mls/hr  1X  ONCE  10/4/17 10:30


 10/4/17 11:29 DC 10/4/17 10:30


1,020 MLS/HR


 


 Heparin Sodium


  (Porcine) 5000


 unit/Ringer's


 Solution  505 ml @ 


 505 mls/hr  1X PERIOP  ONCE  10/2/17 15:30


 10/2/17 16:29 DC 10/2/17 16:44


 


 


 Heparin Sodium/


 Dextrose  500 ml @ 


 20 mls/hr  CONT PRN  PRN  9/27/17 13:00


 9/28/17 09:04 DC 9/27/17 13:45


20 MLS/HR


 


 Heparin Sodium/


 Sodium Chloride  1,000 unit  1X  ONCE  10/3/17 20:45


 10/3/17 20:48 DC 10/3/17 20:55


1,000 UNIT


 


 Hydromorphone HCl


  (Dilaudid)  0.5 mg  PRN Q10MIN  PRN  10/4/17 07:00


 10/5/17 06:59 DC  


 


 


 Info


  (Do NOT chart on


 this entry -- for


 MONITORING)  1 each  PRN DAILY  PRN  10/2/17 15:15


 10/4/17 15:14 DC  


 


 


 Info


  (PHARMACY


 MONITORING -- do


 not chart)  1 each  PRN DAILY  PRN  10/6/17 08:30


   UNV  


 


 


 Insulin Aspart


  (NovoLOG)  0-9 UNITS  TIDWMEALS  10/5/17 17:00


    10/6/17 17:23


9 UNITS


 


 Insulin Detemir


  (Levemir)  30 units  BID66  10/6/17 09:00


    10/7/17 06:21


30 UNITS


 


 Insulin Human


 Regular 150 unit/


 Sodium Chloride  151.5 ml @ 


 0 mls/hr  CONT PRN  PRN  10/4/17 13:45


 10/4/17 15:26 DC  


 


 


 Iodixanol


  (Visipaque 320)  100 ml  1X  ONCE  9/27/17 13:00


 9/27/17 13:47 DC 9/27/17 13:40


72 ML


 


 Iohexol


  (Omnipaque 300


 Mg/ml)  10 ml  1X  ONCE  10/2/17 14:45


 10/2/17 15:06 DC 10/2/17 15:17


10 ML


 


 Isoflurane


  (Isoflurane)  90 ml  STK-MED ONCE  10/4/17 07:48


 10/4/17 07:49 DC  


 


 


 Isosorbide


 Mononitrate


  (Imdur)  30 mg  DAILY  9/28/17 11:00


 10/4/17 14:06 DC 10/3/17 08:48


30 MG


 


 Labetalol HCl


  (Normodyne)  10 mg  PRN Q4HRS  PRN  10/2/17 18:45


    10/4/17 04:59


10 MG


 


 Lidocaine HCl


  (Lidocaine Pf 2%


 Vial)  5 ml  STK-MED ONCE  10/4/17 12:38


 10/4/17 12:39 DC  


 


 


 Lidocaine HCl


  (Xylocaine-Mpf


 1% Vial)  2 ml  PRN 1X  PRN  10/4/17 07:00


 10/5/17 06:59 DC  


 


 


 Losartan Potassium


  (Cozaar)  100 mg  DAILY08  9/28/17 11:00


 9/28/17 11:00 DC  


 


 


 Magnesium Sulfate  5 gm  STK-MED ONCE  10/4/17 12:38


 10/4/17 12:39 DC  


 


 


 Mannitol


  (Mannitol)  12.5 g  STK-MED ONCE  10/4/17 12:38


 10/4/17 12:39 DC  


 


 


 Meperidine HCl


  (Demerol)  12.5 mg  PRN Q15MIN  PRN  10/4/17 13:45


 10/5/17 13:34 DC  


 


 


 Metoclopramide HCl


  (Reglan)  10 mg  1X  ONCE  10/4/17 19:15


 10/4/17 19:15 DC  


 


 


 Metoprolol


 Tartrate


  (Lopressor)  50 mg  BID  10/5/17 09:00


    10/7/17 08:42


50 MG


 


 Midazolam HCl


  (Versed)  2 mg  STK-MED ONCE  10/4/17 10:48


 10/4/17 10:49 DC  


 


 


 Morphine Sulfate  2 mg  PRN Q1HR  PRN  10/5/17 02:15


    10/5/17 12:10


2 MG


 


 Neostigmine


 Methylsulfate


  (Bloxiverz)  10 mg  STK-MED ONCE  10/2/17 17:30


 10/2/17 17:31 DC  


 


 


 Nicardipine HCl


  (Cardene)  25 mg  STK-MED ONCE  10/4/17 07:47


 10/4/17 07:48 DC  


 


 


 Nicardipine HCl


 50 mg/Sodium


 Chloride  270 ml @ 0


 mls/hr  CONT PRN  PRN  10/4/17 13:45


 10/4/17 15:26 DC  


 


 


 Nitroglycerin/


 Dextrose  250 ml @ 0


 mls/hr  CONT  PRN  10/4/17 15:15


    10/5/17 06:51


15 MLS/HR


 


 Ondansetron HCl


  (Zofran)  4 mg  STK-MED ONCE  10/6/17 15:19


 10/6/17 15:20 DC  


 


 


 Oxycodone/


 Acetaminophen


  (Percocet 10/325)  1 tab  PRN Q6HRS  PRN  9/28/17 10:30


 10/4/17 18:25 DC 10/4/17 00:47


1 TAB


 


 Oxycodone/


 Acetaminophen


  (Percocet 5/325)  2 tab  PRN Q4HRS  PRN  10/4/17 13:45


    10/7/17 08:39


2 TAB


 


 Papaverine HCl  60 mg  STK-MED ONCE  10/4/17 06:22


 10/4/17 07:22 DC 10/4/17 08:51


60 MG


 


 Phenylephrine HCl


  (Fabián-Synephrine


 Inj)  10 mg  STK-MED ONCE  10/4/17 06:33


 10/4/17 06:34 DC  


 


 


 Polyethylene


 Glycol


  (miraLAX PACKET)  17 gm  HS  10/5/17 21:00


     


 


 


 Potassium


 Chloride 15 meq/


 Sodium


 Bicarbonate 12.5


 meq/Parenteral


 Electrolytes  520 ml @ 


 520 mls/hr  1X PERIOP  ONCE  10/4/17 06:00


 10/4/17 06:59 DC 10/4/17 06:00


520 MLS/HR


 


 Potassium


 Chloride 70 meq/


 Sodium


 Bicarbonate 12.5


 meq/Lidocaine HCl


 24 ml/Parenteral


 Electrolytes  571.5 ml @ 


 571.5 mls/


 hr  1X PERIOP  ONCE  10/4/17 06:00


 10/4/17 06:59 DC 10/4/17 06:00


571.5 MLS/HR


 


 Prochlorperazine


 Edisylate


  (Compazine)  5 mg  PACU PRN  PRN  10/4/17 07:00


 10/5/17 06:59 DC  


 


 


 Propofol  100 ml @ 0


 mls/hr  CONT PRN  PRN  10/4/17 13:45


 10/4/17 18:25 DC  


 


 


 Protamine Sulfate  250 mg  STK-MED ONCE  10/4/17 09:52


 10/4/17 09:53 DC  


 


 


 Ranolazine


  (Ranexa)  500 mg  BID  9/28/17 11:00


 10/4/17 14:06 DC 10/3/17 22:14


500 MG


 


 Ringer's Solution  1,000 ml @ 


 30 mls/hr  Q24H  10/4/17 13:34


    10/5/17 20:30


30 MLS/HR


 


 Rocuronium Bromide


  (Zemuron)  100 mg  STK-MED ONCE  10/4/17 08:49


 10/4/17 08:50 DC  


 


 


 Senna/Docusate


 Sodium


  (Senna Plus)  1 tab  BID  10/4/17 21:00


    10/7/17 08:41


1 TAB


 


 Sodium Bicarbonate  50 meq  1X  ONCE  10/4/17 19:00


 10/4/17 19:01 DC 10/4/17 18:40


50 MEQ


 


 Sodium Bicarbonate


  (Sodium


 Bicarbonate)  650 mg  TIDWMEALS  10/5/17 08:00


    10/7/17 08:39


650 MG


 


 Sodium Chloride  1,000 ml @ 


 400 mls/hr  Q2H30M PRN  10/6/17 08:18


 10/6/17 16:00 DC  


 


 


 Sodium Chloride


  (Normal Saline


 Flush)  3 ml  PRN Q12HR  PRN  10/4/17 13:45


     


 


 


 Sodium Chloride


  (Sodium Chloride)  50 ml  STK-MED ONCE  10/4/17 06:22


 10/4/17 07:23 DC 10/4/17 08:51


50 ML


 


 Sufentanil Citrate


  (Sufenta)  100 mcg  STK-MED ONCE  10/4/17 12:24


 10/4/17 12:25 DC  


 


 


 Vancomycin HCl


  (Vanco)  10 gm  STK-MED ONCE  10/4/17 06:22


 10/4/17 07:22 DC 10/4/17 08:51


10 GM


 


 Zolpidem Tartrate


  (Ambien)  5 mg  PRN QHS  PRN  9/28/17 09:30


    10/5/17 22:56


5 MG








Lab





Laboratory Tests








Test


  10/6/17


12:46 10/6/17


17:18 10/7/17


06:18 10/7/17


08:22


 


Glucose (Fingerstick)


  135 mg/dL


(70-99) 339 mg/dL


(70-99) 184 mg/dL


(70-99) 150 mg/dL


(70-99)

















AIMEE ESCALANTE MD Oct 7, 2017 10:23

## 2017-10-08 NOTE — PDOC
Provider Note


Provider Note


Pt doing well


 SR


 VSS


 wound sites ok


 off oxygen


 ambulating well


 Anticipate discharge mon or JUAN Prabhakar MD Oct 8, 2017 10:47

## 2017-10-08 NOTE — PDOC
Provider Note


Provider Note


Covering for Dr. Mcmanus


Doing well post-op


No complaints.


Heart regular, Lungs clear











TONE CROCKETT MD Oct 8, 2017 13:16

## 2017-10-08 NOTE — PDOC
GENERAL


General:


vss and afebrile. awake and alert and complains of fatigue and some back ache 

from laying in bed. exam with decent breath sounds and heart regular. has been 

walking halls with his prosthesis. also not sleeping well and will address same.


Problems:  





VITAL SIGNS


Vital Signs:





Vital Signs








  Date Time  Temp Pulse Resp B/P (MAP) Pulse Ox O2 Delivery O2 Flow Rate FiO2


 


10/8/17 11:58   18   Room Air  


 


10/8/17 11:07 98.3 78  149/77 (101) 96   





 98.3       


 


10/7/17 12:28       2.0 











ALLERGIES


Allergies:





Allergies








Coded Allergies Type Severity Reaction Last Updated Verified


 


  No Known Drug Allergies    10/4/17 No











MEDS


Medications:





Current Medications








 Medications


  (Trade)  Dose


 Ordered  Sig/Yisel  Start Time


 Stop Time Status Last Admin


Dose Admin


 


 Acetaminophen


  (Acetaminophen


 Supp)  650 mg  PRN Q4HRS  PRN  10/4/17 13:45


     


 


 


 Acetaminophen


  (Tylenol)  650 mg  PRN Q4HRS  PRN  10/4/17 13:45


     


 


 


 Albumin Human  250 ml @ 


 60 mls/hr  PRN Q4HRS  PRN  10/4/17 13:45


    10/4/17 18:25


60 MLS/HR


 


 Albuterol Sulfate


  (Ventolin Neb


 Soln)  2.5 mg  PRN Q4HRS  PRN  10/4/17 13:45


     


 


 


 Aminocaproic Acid


  (Amicar)  5,000 mg  STK-MED ONCE  10/4/17 06:33


 10/4/17 06:34 DC  


 


 


 Amiodarone HCl


  (Cordarone)  200 mg  BID  10/5/17 21:00


    10/8/17 08:59


200 MG


 


 Amiodarone HCl


 150 mg/Dextrose  103 ml @ 


 200 mls/hr  ONCE  ONCE  10/4/17 13:45


 10/4/17 14:15 DC 10/4/17 15:46


200 MLS/HR


 


 Amiodarone HCl


 900 mg/Dextrose  518 ml @ 


 33.33 mls/


 hr  CONT  PRN  10/4/17 13:45


 10/7/17 08:34 DC 10/4/17 15:46


33.33 MLS/HR


 


 Amlodipine


 Besylate


  (Norvasc)  10 mg  DAILY  9/28/17 11:00


 10/4/17 14:06 DC 10/3/17 08:48


10 MG


 


 Aspirin


  (Aspirin)  300 mg  PRN DAILY  PRN  10/4/17 13:45


     


 


 


 Aspirin


  (Myles Aspirin)  325 mg  DAILY  9/28/17 11:00


 9/28/17 11:00 DC  


 


 


 Aspirin


  (Ecotrin)  325 mg  DAILYWBKFT  10/5/17 08:00


    10/8/17 08:58


325 MG


 


 Bisacodyl


  (Dulcolax Supp)  10 mg  PRN DAILY  PRN  10/4/17 13:45


     


 


 


 Calcium Chloride  1,000 mg  STK-MED ONCE  10/4/17 12:38


 10/4/17 12:39 DC  


 


 


 Cefazolin Sodium


 1 gm/Sodium


 Chloride  500 ml @ 


 500 mls/hr  1X PERIOP  ONCE  10/4/17 06:00


 10/4/17 06:59 DC 10/4/17 08:51


 


 


 Cefazolin Sodium/


 Dextrose  50 ml @ 


 100 mls/hr  Q24H  10/5/17 08:00


 10/6/17 08:29 DC 10/5/17 12:00


100 MLS/HR


 


 Cellulose  1 each  STK-MED ONCE  10/4/17 06:22


 10/4/17 07:22 DC 10/4/17 08:51


1 EACH


 


 Citric Acid/


 Sodium Citrate


  (Bicitra)  30 ml  1X  ONCE  10/4/17 19:15


 10/4/17 19:15 DC  


 


 


 Clopidogrel


 Bisulfate


  (Plavix)  75 mg  DAILY  9/28/17 11:00


 9/28/17 11:00 DC  


 


 


 Cyclobenzaprine


 HCl


  (Flexeril)  10 mg  PRN Q6HRS  PRN  9/27/17 16:45


    10/8/17 11:57


10 MG


 


 Darbepoetin Benny


  (Aranesp)  100 mcg  Th  10/12/17 21:00


     


 


 


 Desflurane


  (Suprane)  30 ml  STK-MED ONCE  10/2/17 16:46


 10/2/17 16:47 DC  


 


 


 Dexamethasone


 Sodium Phosphate


  (Decadron)  20 mg  STK-MED ONCE  10/4/17 10:20


 10/4/17 10:21 DC  


 


 


 Dextrose


  (Dextrose


 50%-Water Syringe)  12.5 gm  PRN Q15MIN  PRN  10/5/17 15:30


     


 


 


 Diazepam


  (Valium)  10 mg  PRN Q6HRS  PRN  10/1/17 12:30


 10/4/17 14:07 DC 10/3/17 15:31


10 MG


 


 Diphenhydramine


 HCl


  (Benadryl)  25 mg  1X PRN  PRN  10/5/17 08:00


 10/6/17 07:59 DC  


 


 


 Ephedrine Sulfate


  (Akovaz)  50 mg  STK-MED ONCE  10/4/17 06:32


 10/4/17 06:33 DC  


 


 


 Etomidate


  (Amidate)  20 mg  STK-MED ONCE  10/4/17 06:32


 10/4/17 06:33 DC  


 


 


 Famotidine


  (Pepcid)  20 mg  Q48H  10/6/17 21:00


    10/6/17 21:14


20 MG


 


 Fentanyl Citrate


  (Fentanyl 2ml


 Vial)  50 mcg  1X  ONCE  10/3/17 20:45


 10/3/17 20:48 DC  


 


 


 Glycopyrrolate


  (Robinul)  1 mg  STK-MED ONCE  10/2/17 17:30


 10/2/17 17:31 DC  


 


 


 Heparin Sodium


  (Porcine)  30,000 unit  STK-MED ONCE  10/4/17 12:37


 10/4/17 12:38 DC  


 


 


 Heparin Sodium


  (Porcine)


  (Heparin 5,000


 Units/1,000ml NS)  5,000 unit  1X  ONCE  9/27/17 13:00


 9/27/17 13:47 DC 9/27/17 13:00


5,000 UNIT


 


 Heparin Sodium


  (Porcine)


  (Heparin Sodium)  10,000 unit  STK-MED ONCE  10/4/17 12:37


 10/4/17 12:38 DC  


 


 


 Heparin Sodium


  (Porcine) 12822


 unit/Ringer's


 Solution  1,020 ml @ 


 1,020 mls/hr  1X PERIOP  ONCE  10/4/17 06:00


 10/4/17 06:59 DC 10/4/17 08:51


 


 


 Heparin Sodium


  (Porcine) 39206


 unit/Sodium


 Chloride  1,020 ml @ 


 1,020 mls/hr  1X  ONCE  10/4/17 10:30


 10/4/17 11:29 DC 10/4/17 10:30


1,020 MLS/HR


 


 Heparin Sodium


  (Porcine) 5000


 unit/Ringer's


 Solution  505 ml @ 


 505 mls/hr  1X PERIOP  ONCE  10/2/17 15:30


 10/2/17 16:29 DC 10/2/17 16:44


 


 


 Heparin Sodium/


 Dextrose  500 ml @ 


 20 mls/hr  CONT PRN  PRN  9/27/17 13:00


 9/28/17 09:04 DC 9/27/17 13:45


20 MLS/HR


 


 Heparin Sodium/


 Sodium Chloride  1,000 unit  1X  ONCE  10/3/17 20:45


 10/3/17 20:48 DC 10/3/17 20:55


1,000 UNIT


 


 Hydromorphone HCl


  (Dilaudid)  0.5 mg  PRN Q10MIN  PRN  10/4/17 07:00


 10/5/17 06:59 DC  


 


 


 Info


  (Do NOT chart on


 this entry -- for


 MONITORING)  1 each  PRN DAILY  PRN  10/2/17 15:15


 10/4/17 15:14 DC  


 


 


 Info


  (PHARMACY


 MONITORING -- do


 not chart)  1 each  PRN DAILY  PRN  10/6/17 08:30


   UNV  


 


 


 Insulin Aspart


  (NovoLOG)  10 units  TIDAC  10/8/17 07:30


    10/8/17 09:10


10 UNITS


 


 Insulin Detemir


  (Levemir)  40 units  BID66  10/7/17 18:30


    10/8/17 09:03


40 UNITS


 


 Insulin Human


 Regular 150 unit/


 Sodium Chloride  151.5 ml @ 


 0 mls/hr  CONT PRN  PRN  10/4/17 13:45


 10/4/17 15:26 DC  


 


 


 Iodixanol


  (Visipaque 320)  100 ml  1X  ONCE  9/27/17 13:00


 9/27/17 13:47 DC 9/27/17 13:40


72 ML


 


 Iohexol


  (Omnipaque 300


 Mg/ml)  10 ml  1X  ONCE  10/2/17 14:45


 10/2/17 15:06 DC 10/2/17 15:17


10 ML


 


 Isoflurane


  (Isoflurane)  90 ml  STK-MED ONCE  10/4/17 07:48


 10/4/17 07:49 DC  


 


 


 Isosorbide


 Mononitrate


  (Imdur)  30 mg  DAILY  9/28/17 11:00


 10/4/17 14:06 DC 10/3/17 08:48


30 MG


 


 Labetalol HCl


  (Normodyne)  10 mg  PRN Q4HRS  PRN  10/2/17 18:45


    10/4/17 04:59


10 MG


 


 Lidocaine HCl


  (Lidocaine Pf 2%


 Vial)  5 ml  STK-MED ONCE  10/4/17 12:38


 10/4/17 12:39 DC  


 


 


 Lidocaine HCl


  (Xylocaine-Mpf


 1% Vial)  2 ml  PRN 1X  PRN  10/4/17 07:00


 10/5/17 06:59 DC  


 


 


 Losartan Potassium


  (Cozaar)  100 mg  DAILY08  9/28/17 11:00


 9/28/17 11:00 DC  


 


 


 Magnesium Sulfate  5 gm  STK-MED ONCE  10/4/17 12:38


 10/4/17 12:39 DC  


 


 


 Mannitol


  (Mannitol)  12.5 g  STK-MED ONCE  10/4/17 12:38


 10/4/17 12:39 DC  


 


 


 Meperidine HCl


  (Demerol)  12.5 mg  PRN Q15MIN  PRN  10/4/17 13:45


 10/5/17 13:34 DC  


 


 


 Metoclopramide HCl


  (Reglan)  10 mg  1X  ONCE  10/4/17 19:15


 10/4/17 19:15 DC  


 


 


 Metoprolol


 Tartrate


  (Lopressor)  50 mg  BID  10/5/17 09:00


    10/8/17 08:59


50 MG


 


 Midazolam HCl


  (Versed)  2 mg  STK-MED ONCE  10/4/17 10:48


 10/4/17 10:49 DC  


 


 


 Morphine Sulfate  2 mg  PRN Q1HR  PRN  10/5/17 02:15


    10/8/17 11:58


2 MG


 


 Neostigmine


 Methylsulfate


  (Bloxiverz)  10 mg  STK-MED ONCE  10/2/17 17:30


 10/2/17 17:31 DC  


 


 


 Nicardipine HCl


  (Cardene)  25 mg  STK-MED ONCE  10/4/17 07:47


 10/4/17 07:48 DC  


 


 


 Nicardipine HCl


 50 mg/Sodium


 Chloride  270 ml @ 0


 mls/hr  CONT PRN  PRN  10/4/17 13:45


 10/4/17 15:26 DC  


 


 


 Nitroglycerin/


 Dextrose  250 ml @ 0


 mls/hr  CONT  PRN  10/4/17 15:15


    10/5/17 06:51


15 MLS/HR


 


 Ondansetron HCl


  (Zofran)  4 mg  STK-MED ONCE  10/6/17 15:19


 10/6/17 15:20 DC  


 


 


 Oxycodone/


 Acetaminophen


  (Percocet 10/325)  1 tab  PRN Q6HRS  PRN  9/28/17 10:30


 10/4/17 18:25 DC 10/4/17 00:47


1 TAB


 


 Oxycodone/


 Acetaminophen


  (Percocet 5/325)  2 tab  PRN Q4HRS  PRN  10/4/17 13:45


    10/8/17 09:00


2 TAB


 


 Papaverine HCl  60 mg  STK-MED ONCE  10/4/17 06:22


 10/4/17 07:22 DC 10/4/17 08:51


60 MG


 


 Phenylephrine HCl


  (Fabián-Synephrine


 Inj)  10 mg  STK-MED ONCE  10/4/17 06:33


 10/4/17 06:34 DC  


 


 


 Polyethylene


 Glycol


  (miraLAX PACKET)  17 gm  HS  10/5/17 21:00


     


 


 


 Potassium


 Chloride 15 meq/


 Sodium


 Bicarbonate 12.5


 meq/Parenteral


 Electrolytes  520 ml @ 


 520 mls/hr  1X PERIOP  ONCE  10/4/17 06:00


 10/4/17 06:59 DC 10/4/17 06:00


520 MLS/HR


 


 Potassium


 Chloride 70 meq/


 Sodium


 Bicarbonate 12.5


 meq/Lidocaine HCl


 24 ml/Parenteral


 Electrolytes  571.5 ml @ 


 571.5 mls/


 hr  1X PERIOP  ONCE  10/4/17 06:00


 10/4/17 06:59 DC 10/4/17 06:00


571.5 MLS/HR


 


 Prochlorperazine


 Edisylate


  (Compazine)  5 mg  PACU PRN  PRN  10/4/17 07:00


 10/5/17 06:59 DC  


 


 


 Propofol  100 ml @ 0


 mls/hr  CONT PRN  PRN  10/4/17 13:45


 10/4/17 18:25 DC  


 


 


 Protamine Sulfate  250 mg  STK-MED ONCE  10/4/17 09:52


 10/4/17 09:53 DC  


 


 


 Ranolazine


  (Ranexa)  500 mg  BID  9/28/17 11:00


 10/4/17 14:06 DC 10/3/17 22:14


500 MG


 


 Ringer's Solution  1,000 ml @ 


 30 mls/hr  Q24H  10/4/17 13:34


 10/7/17 10:24 DC 10/5/17 20:30


30 MLS/HR


 


 Rocuronium Bromide


  (Zemuron)  100 mg  STK-MED ONCE  10/4/17 08:49


 10/4/17 08:50 DC  


 


 


 Senna/Docusate


 Sodium


  (Senna Plus)  1 tab  BID  10/4/17 21:00


    10/8/17 08:59


1 TAB


 


 Sodium Bicarbonate  50 meq  1X  ONCE  10/4/17 19:00


 10/4/17 19:01 DC 10/4/17 18:40


50 MEQ


 


 Sodium Bicarbonate


  (Sodium


 Bicarbonate)  650 mg  TIDWMEALS  10/5/17 08:00


    10/8/17 09:20


650 MG


 


 Sodium Chloride  1,000 ml @ 


 400 mls/hr  Q2H30M PRN  10/6/17 08:18


 10/6/17 16:00 DC  


 


 


 Sodium Chloride


  (Normal Saline


 Flush)  3 ml  PRN Q12HR  PRN  10/4/17 13:45


     


 


 


 Sodium Chloride


  (Sodium Chloride)  50 ml  STK-MED ONCE  10/4/17 06:22


 10/4/17 07:23 DC 10/4/17 08:51


50 ML


 


 Sufentanil Citrate


  (Sufenta)  100 mcg  STK-MED ONCE  10/4/17 12:24


 10/4/17 12:25 DC  


 


 


 Vancomycin HCl


  (Vanco)  10 gm  STK-MED ONCE  10/4/17 06:22


 10/4/17 07:22 DC 10/4/17 08:51


10 GM


 


 Zolpidem Tartrate


  (Ambien)  5 mg  PRN QHS  PRN  9/28/17 09:30


    10/5/17 22:56


5 MG











LAB


Lab:





Laboratory Tests








Test


  10/7/17


12:26 10/7/17


17:26 10/7/17


20:50 10/8/17


04:00


 


Glucose (Fingerstick)


  286 mg/dL


(70-99) 388 mg/dL


(70-99) 241 mg/dL


(70-99) 


 


 


White Blood Count


  


  


  


  12.2 x10^3/uL


(4.0-11.0)


 


Red Blood Count


  


  


  


  2.88 x10^6/uL


(4.30-5.70)


 


Hemoglobin


  


  


  


  8.2 g/dL


(13.0-17.5)


 


Hematocrit


  


  


  


  24.9 %


(39.0-53.0)


 


Mean Corpuscular Volume    87 fL () 


 


Mean Corpuscular Hemoglobin    29 pg (25-35) 


 


Mean Corpuscular Hemoglobin


Concent 


  


  


  33 g/dL


(31-37)


 


Red Cell Distribution Width


  


  


  


  14.0 %


(11.5-14.5)


 


Platelet Count


  


  


  


  280 x10^3/uL


(140-400)


 


Neutrophils (%) (Auto)    68 % (31-73) 


 


Lymphocytes (%) (Auto)    21 % (24-48) 


 


Monocytes (%) (Auto)    8 % (0-9) 


 


Eosinophils (%) (Auto)    2 % (0-3) 


 


Basophils (%) (Auto)    0 % (0-3) 


 


Neutrophils # (Auto)


  


  


  


  8.3 x10^3uL


(1.8-7.7)


 


Lymphocytes # (Auto)


  


  


  


  2.6 x10^3/uL


(1.0-4.8)


 


Monocytes # (Auto)


  


  


  


  1.0 x10^3/uL


(0.0-1.1)


 


Eosinophils # (Auto)


  


  


  


  0.3 x10^3/uL


(0.0-0.7)


 


Basophils # (Auto)


  


  


  


  0.0 x10^3/uL


(0.0-0.2)


 


Sodium Level


  


  


  


  137 mmol/L


(136-145)


 


Potassium Level


  


  


  


  3.2 mmol/L


(3.5-5.1)


 


Chloride Level


  


  


  


  97 mmol/L


()


 


Carbon Dioxide Level


  


  


  


  28 mmol/L


(21-32)


 


Anion Gap    12 (6-14) 


 


Blood Urea Nitrogen


  


  


  


  69 mg/dL


(8-26)


 


Creatinine


  


  


  


  4.1 mg/dL


(0.7-1.3)


 


Estimated GFR


(Cockcroft-Gault) 


  


  


  15.3 


 


 


BUN/Creatinine Ratio    17 (6-20) 


 


Glucose Level


  


  


  


  180 mg/dL


(70-99)


 


Calcium Level


  


  


  


  8.4 mg/dL


(8.5-10.1)


 


Total Bilirubin


  


  


  


  0.3 mg/dL


(0.2-1.0)


 


Aspartate Amino Transf


(AST/SGOT) 


  


  


  24 U/L (15-37) 


 


 


Alanine Aminotransferase


(ALT/SGPT) 


  


  


  18 U/L (16-63) 


 


 


Alkaline Phosphatase


  


  


  


  84 U/L


()


 


Total Protein


  


  


  


  6.9 g/dL


(6.4-8.2)


 


Albumin


  


  


  


  2.6 g/dL


(3.4-5.0)


 


Albumin/Globulin Ratio    0.6 (1.0-1.7) 


 


Test


  10/8/17


06:49 10/8/17


08:14 


  


 


 


Glucose (Fingerstick)


  147 mg/dL


(70-99) 140 mg/dL


(70-99) 


  


 

















JEFFERY VANG MD Oct 8, 2017 12:00

## 2017-10-08 NOTE — PDOC
SUBJECTIVE


ROS


ESRD





doing well but apetite is not back yet





CVS:   no Orthopnea, no CP


RESP:   no SOB, no MORALES


GI:   no Nausea, no Vomiting


:   no Dysuria, no Urgency





OBJECTIVE


Vital Signs





Vital Signs








  Date Time  Temp Pulse Resp B/P (MAP) Pulse Ox O2 Delivery O2 Flow Rate FiO2


 


10/8/17 11:58   18   Room Air  


 


10/8/17 11:07 98.3 78  149/77 (101) 96   





 98.3       


 


10/7/17 12:28       2.0 











PHYSICAL EXAM


Physical Exam


GEN:    Awake, Oriented x 2-3  , In no distress


EYES:  Vision Unchanged, Conjunctiva Normal


EN:      No EN Drainage, Mucous Membranes moist


NECK:  no JVD, no JVP, Supple, no Thyromegaly


CVS:    S1S2, no Murmur, No Gallop, No Rub,no Edema


RESP:  Few Rales, no Rhonchi,no Acc. Muscle Use


GI:        BS + ve, NO Bruit, Non Tender, Non Distended


:      no CVA tenderness, no Suprapubic Tenderness











DIAGNOSIS/ASSESSMENT


Assessment & Plan


ESRD:      Current fluid and E-lyte status does not necessitate emergent need 

for dialysis.  Will re-evaluate for dialysis in the am and continue on MWF 

schedule.





ANEMIA;    Aranesp as ordered,  Transfuse  with next HD as needed





HTN:   Current BP meds as reviewed.  on numerous prn meds watch trend, resume 

home meds once able to take PO





BONE & MINERAL:   follow phos and alter   binders prn





CAD - S/p CABG





Low Alb - Post-op - NEPRO TID until good PO intake resumes





Discussed Plan of Care with pt/ wife at bedside





COMMENT/RELEVANT DATA


Meds





Current Medications








 Medications


  (Trade)  Dose


 Ordered  Sig/Yisel  Start Time


 Stop Time Status Last Admin


Dose Admin


 


 Acetaminophen


  (Acetaminophen


 Supp)  650 mg  PRN Q4HRS  PRN  10/4/17 13:45


     


 


 


 Acetaminophen


  (Tylenol)  650 mg  PRN Q4HRS  PRN  10/4/17 13:45


     


 


 


 Albumin Human  250 ml @ 


 60 mls/hr  PRN Q4HRS  PRN  10/4/17 13:45


    10/4/17 18:25


60 MLS/HR


 


 Albuterol Sulfate


  (Ventolin Neb


 Soln)  2.5 mg  PRN Q4HRS  PRN  10/4/17 13:45


     


 


 


 Aminocaproic Acid


  (Amicar)  5,000 mg  STK-MED ONCE  10/4/17 06:33


 10/4/17 06:34 DC  


 


 


 Amiodarone HCl


  (Cordarone)  200 mg  BID  10/5/17 21:00


    10/8/17 08:59


200 MG


 


 Amiodarone HCl


 150 mg/Dextrose  103 ml @ 


 200 mls/hr  ONCE  ONCE  10/4/17 13:45


 10/4/17 14:15 DC 10/4/17 15:46


200 MLS/HR


 


 Amiodarone HCl


 900 mg/Dextrose  518 ml @ 


 33.33 mls/


 hr  CONT  PRN  10/4/17 13:45


 10/7/17 08:34 DC 10/4/17 15:46


33.33 MLS/HR


 


 Amlodipine


 Besylate


  (Norvasc)  10 mg  DAILY  9/28/17 11:00


 10/4/17 14:06 DC 10/3/17 08:48


10 MG


 


 Aspirin


  (Aspirin)  300 mg  PRN DAILY  PRN  10/4/17 13:45


     


 


 


 Aspirin


  (Myles Aspirin)  325 mg  DAILY  9/28/17 11:00


 9/28/17 11:00 DC  


 


 


 Aspirin


  (Ecotrin)  325 mg  DAILYWBKFT  10/5/17 08:00


    10/8/17 08:58


325 MG


 


 Bisacodyl


  (Dulcolax Supp)  10 mg  PRN DAILY  PRN  10/4/17 13:45


     


 


 


 Calcium Chloride  1,000 mg  STK-MED ONCE  10/4/17 12:38


 10/4/17 12:39 DC  


 


 


 Cefazolin Sodium


 1 gm/Sodium


 Chloride  500 ml @ 


 500 mls/hr  1X PERIOP  ONCE  10/4/17 06:00


 10/4/17 06:59 DC 10/4/17 08:51


 


 


 Cefazolin Sodium/


 Dextrose  50 ml @ 


 100 mls/hr  Q24H  10/5/17 08:00


 10/6/17 08:29 DC 10/5/17 12:00


100 MLS/HR


 


 Cellulose  1 each  STK-MED ONCE  10/4/17 06:22


 10/4/17 07:22 DC 10/4/17 08:51


1 EACH


 


 Citric Acid/


 Sodium Citrate


  (Bicitra)  30 ml  1X  ONCE  10/4/17 19:15


 10/4/17 19:15 DC  


 


 


 Clopidogrel


 Bisulfate


  (Plavix)  75 mg  DAILY  9/28/17 11:00


 9/28/17 11:00 DC  


 


 


 Cyclobenzaprine


 HCl


  (Flexeril)  10 mg  PRN Q6HRS  PRN  9/27/17 16:45


    10/8/17 11:57


10 MG


 


 Darbepoetin Benny


  (Aranesp)  100 mcg  Th  10/12/17 21:00


     


 


 


 Desflurane


  (Suprane)  30 ml  STK-MED ONCE  10/2/17 16:46


 10/2/17 16:47 DC  


 


 


 Dexamethasone


 Sodium Phosphate


  (Decadron)  20 mg  STK-MED ONCE  10/4/17 10:20


 10/4/17 10:21 DC  


 


 


 Dextrose


  (Dextrose


 50%-Water Syringe)  12.5 gm  PRN Q15MIN  PRN  10/5/17 15:30


     


 


 


 Diazepam


  (Valium)  10 mg  PRN Q6HRS  PRN  10/1/17 12:30


 10/4/17 14:07 DC 10/3/17 15:31


10 MG


 


 Diphenhydramine


 HCl


  (Benadryl)  25 mg  1X PRN  PRN  10/5/17 08:00


 10/6/17 07:59 DC  


 


 


 Ephedrine Sulfate


  (Akovaz)  50 mg  STK-MED ONCE  10/4/17 06:32


 10/4/17 06:33 DC  


 


 


 Etomidate


  (Amidate)  20 mg  STK-MED ONCE  10/4/17 06:32


 10/4/17 06:33 DC  


 


 


 Famotidine


  (Pepcid)  20 mg  Q48H  10/6/17 21:00


    10/6/17 21:14


20 MG


 


 Fentanyl Citrate


  (Fentanyl 2ml


 Vial)  50 mcg  1X  ONCE  10/3/17 20:45


 10/3/17 20:48 DC  


 


 


 Glycopyrrolate


  (Robinul)  1 mg  STK-MED ONCE  10/2/17 17:30


 10/2/17 17:31 DC  


 


 


 Heparin Sodium


  (Porcine)  30,000 unit  STK-MED ONCE  10/4/17 12:37


 10/4/17 12:38 DC  


 


 


 Heparin Sodium


  (Porcine)


  (Heparin 5,000


 Units/1,000ml NS)  5,000 unit  1X  ONCE  9/27/17 13:00


 9/27/17 13:47 DC 9/27/17 13:00


5,000 UNIT


 


 Heparin Sodium


  (Porcine)


  (Heparin Sodium)  10,000 unit  STK-MED ONCE  10/4/17 12:37


 10/4/17 12:38 DC  


 


 


 Heparin Sodium


  (Porcine) 56173


 unit/Ringer's


 Solution  1,020 ml @ 


 1,020 mls/hr  1X PERIOP  ONCE  10/4/17 06:00


 10/4/17 06:59 DC 10/4/17 08:51


 


 


 Heparin Sodium


  (Porcine) 00387


 unit/Sodium


 Chloride  1,020 ml @ 


 1,020 mls/hr  1X  ONCE  10/4/17 10:30


 10/4/17 11:29 DC 10/4/17 10:30


1,020 MLS/HR


 


 Heparin Sodium


  (Porcine) 5000


 unit/Ringer's


 Solution  505 ml @ 


 505 mls/hr  1X PERIOP  ONCE  10/2/17 15:30


 10/2/17 16:29 DC 10/2/17 16:44


 


 


 Heparin Sodium/


 Dextrose  500 ml @ 


 20 mls/hr  CONT PRN  PRN  9/27/17 13:00


 9/28/17 09:04 DC 9/27/17 13:45


20 MLS/HR


 


 Heparin Sodium/


 Sodium Chloride  1,000 unit  1X  ONCE  10/3/17 20:45


 10/3/17 20:48 DC 10/3/17 20:55


1,000 UNIT


 


 Hydromorphone HCl


  (Dilaudid)  0.5 mg  PRN Q10MIN  PRN  10/4/17 07:00


 10/5/17 06:59 DC  


 


 


 Info


  (Do NOT chart on


 this entry -- for


 MONITORING)  1 each  PRN DAILY  PRN  10/2/17 15:15


 10/4/17 15:14 DC  


 


 


 Info


  (PHARMACY


 MONITORING -- do


 not chart)  1 each  PRN DAILY  PRN  10/6/17 08:30


   UNV  


 


 


 Insulin Aspart


  (NovoLOG)  10 units  TIDAC  10/8/17 07:30


    10/8/17 09:10


10 UNITS


 


 Insulin Detemir


  (Levemir)  40 units  BID66  10/7/17 18:30


    10/8/17 09:03


40 UNITS


 


 Insulin Human


 Regular 150 unit/


 Sodium Chloride  151.5 ml @ 


 0 mls/hr  CONT PRN  PRN  10/4/17 13:45


 10/4/17 15:26 DC  


 


 


 Iodixanol


  (Visipaque 320)  100 ml  1X  ONCE  9/27/17 13:00


 9/27/17 13:47 DC 9/27/17 13:40


72 ML


 


 Iohexol


  (Omnipaque 300


 Mg/ml)  10 ml  1X  ONCE  10/2/17 14:45


 10/2/17 15:06 DC 10/2/17 15:17


10 ML


 


 Isoflurane


  (Isoflurane)  90 ml  STK-MED ONCE  10/4/17 07:48


 10/4/17 07:49 DC  


 


 


 Isosorbide


 Mononitrate


  (Imdur)  30 mg  DAILY  9/28/17 11:00


 10/4/17 14:06 DC 10/3/17 08:48


30 MG


 


 Labetalol HCl


  (Normodyne)  10 mg  PRN Q4HRS  PRN  10/2/17 18:45


    10/4/17 04:59


10 MG


 


 Lidocaine HCl


  (Lidocaine Pf 2%


 Vial)  5 ml  STK-MED ONCE  10/4/17 12:38


 10/4/17 12:39 DC  


 


 


 Lidocaine HCl


  (Xylocaine-Mpf


 1% Vial)  2 ml  PRN 1X  PRN  10/4/17 07:00


 10/5/17 06:59 DC  


 


 


 Losartan Potassium


  (Cozaar)  100 mg  DAILY08  9/28/17 11:00


 9/28/17 11:00 DC  


 


 


 Magnesium Sulfate  5 gm  STK-MED ONCE  10/4/17 12:38


 10/4/17 12:39 DC  


 


 


 Mannitol


  (Mannitol)  12.5 g  STK-MED ONCE  10/4/17 12:38


 10/4/17 12:39 DC  


 


 


 Meperidine HCl


  (Demerol)  12.5 mg  PRN Q15MIN  PRN  10/4/17 13:45


 10/5/17 13:34 DC  


 


 


 Metoclopramide HCl


  (Reglan)  10 mg  1X  ONCE  10/4/17 19:15


 10/4/17 19:15 DC  


 


 


 Metoprolol


 Tartrate


  (Lopressor)  50 mg  BID  10/5/17 09:00


    10/8/17 08:59


50 MG


 


 Midazolam HCl


  (Versed)  2 mg  STK-MED ONCE  10/4/17 10:48


 10/4/17 10:49 DC  


 


 


 Morphine Sulfate  2 mg  PRN Q1HR  PRN  10/5/17 02:15


    10/8/17 11:58


2 MG


 


 Neostigmine


 Methylsulfate


  (Bloxiverz)  10 mg  STK-MED ONCE  10/2/17 17:30


 10/2/17 17:31 DC  


 


 


 Nicardipine HCl


  (Cardene)  25 mg  STK-MED ONCE  10/4/17 07:47


 10/4/17 07:48 DC  


 


 


 Nicardipine HCl


 50 mg/Sodium


 Chloride  270 ml @ 0


 mls/hr  CONT PRN  PRN  10/4/17 13:45


 10/4/17 15:26 DC  


 


 


 Nitroglycerin/


 Dextrose  250 ml @ 0


 mls/hr  CONT  PRN  10/4/17 15:15


    10/5/17 06:51


15 MLS/HR


 


 Ondansetron HCl


  (Zofran)  4 mg  STK-MED ONCE  10/6/17 15:19


 10/6/17 15:20 DC  


 


 


 Oxycodone/


 Acetaminophen


  (Percocet 10/325)  1 tab  PRN Q6HRS  PRN  9/28/17 10:30


 10/4/17 18:25 DC 10/4/17 00:47


1 TAB


 


 Oxycodone/


 Acetaminophen


  (Percocet 5/325)  2 tab  PRN Q4HRS  PRN  10/4/17 13:45


    10/8/17 09:00


2 TAB


 


 Papaverine HCl  60 mg  STK-MED ONCE  10/4/17 06:22


 10/4/17 07:22 DC 10/4/17 08:51


60 MG


 


 Phenylephrine HCl


  (Fabián-Synephrine


 Inj)  10 mg  STK-MED ONCE  10/4/17 06:33


 10/4/17 06:34 DC  


 


 


 Polyethylene


 Glycol


  (miraLAX PACKET)  17 gm  HS  10/5/17 21:00


     


 


 


 Potassium


 Chloride 15 meq/


 Sodium


 Bicarbonate 12.5


 meq/Parenteral


 Electrolytes  520 ml @ 


 520 mls/hr  1X PERIOP  ONCE  10/4/17 06:00


 10/4/17 06:59 DC 10/4/17 06:00


520 MLS/HR


 


 Potassium


 Chloride 70 meq/


 Sodium


 Bicarbonate 12.5


 meq/Lidocaine HCl


 24 ml/Parenteral


 Electrolytes  571.5 ml @ 


 571.5 mls/


 hr  1X PERIOP  ONCE  10/4/17 06:00


 10/4/17 06:59 DC 10/4/17 06:00


571.5 MLS/HR


 


 Prochlorperazine


 Edisylate


  (Compazine)  5 mg  PACU PRN  PRN  10/4/17 07:00


 10/5/17 06:59 DC  


 


 


 Propofol  100 ml @ 0


 mls/hr  CONT PRN  PRN  10/4/17 13:45


 10/4/17 18:25 DC  


 


 


 Protamine Sulfate  250 mg  STK-MED ONCE  10/4/17 09:52


 10/4/17 09:53 DC  


 


 


 Ranolazine


  (Ranexa)  500 mg  BID  9/28/17 11:00


 10/4/17 14:06 DC 10/3/17 22:14


500 MG


 


 Ringer's Solution  1,000 ml @ 


 30 mls/hr  Q24H  10/4/17 13:34


 10/7/17 10:24 DC 10/5/17 20:30


30 MLS/HR


 


 Rocuronium Bromide


  (Zemuron)  100 mg  STK-MED ONCE  10/4/17 08:49


 10/4/17 08:50 DC  


 


 


 Senna/Docusate


 Sodium


  (Senna Plus)  1 tab  BID  10/4/17 21:00


    10/8/17 08:59


1 TAB


 


 Sodium Bicarbonate  50 meq  1X  ONCE  10/4/17 19:00


 10/4/17 19:01 DC 10/4/17 18:40


50 MEQ


 


 Sodium Bicarbonate


  (Sodium


 Bicarbonate)  650 mg  TIDWMEALS  10/5/17 08:00


    10/8/17 09:20


650 MG


 


 Sodium Chloride  1,000 ml @ 


 400 mls/hr  Q2H30M PRN  10/6/17 08:18


 10/6/17 16:00 DC  


 


 


 Sodium Chloride


  (Normal Saline


 Flush)  3 ml  PRN Q12HR  PRN  10/4/17 13:45


     


 


 


 Sodium Chloride


  (Sodium Chloride)  50 ml  STK-MED ONCE  10/4/17 06:22


 10/4/17 07:23 DC 10/4/17 08:51


50 ML


 


 Sufentanil Citrate


  (Sufenta)  100 mcg  STK-MED ONCE  10/4/17 12:24


 10/4/17 12:25 DC  


 


 


 Trazodone HCl


  (Desyrel)  50 mg  QHS  10/8/17 21:00


     


 


 


 Vancomycin HCl


  (Vanco)  10 gm  STK-MED ONCE  10/4/17 06:22


 10/4/17 07:22 DC 10/4/17 08:51


10 GM


 


 Zolpidem Tartrate


  (Ambien)  5 mg  PRN QHS  PRN  9/28/17 09:30


    10/5/17 22:56


5 MG








Lab





Laboratory Tests








Test


  10/7/17


12:26 10/7/17


17:26 10/7/17


20:50 10/8/17


04:00


 


Glucose (Fingerstick)


  286 mg/dL


(70-99) 388 mg/dL


(70-99) 241 mg/dL


(70-99) 


 


 


White Blood Count


  


  


  


  12.2 x10^3/uL


(4.0-11.0)


 


Red Blood Count


  


  


  


  2.88 x10^6/uL


(4.30-5.70)


 


Hemoglobin


  


  


  


  8.2 g/dL


(13.0-17.5)


 


Hematocrit


  


  


  


  24.9 %


(39.0-53.0)


 


Mean Corpuscular Volume    87 fL () 


 


Mean Corpuscular Hemoglobin    29 pg (25-35) 


 


Mean Corpuscular Hemoglobin


Concent 


  


  


  33 g/dL


(31-37)


 


Red Cell Distribution Width


  


  


  


  14.0 %


(11.5-14.5)


 


Platelet Count


  


  


  


  280 x10^3/uL


(140-400)


 


Neutrophils (%) (Auto)    68 % (31-73) 


 


Lymphocytes (%) (Auto)    21 % (24-48) 


 


Monocytes (%) (Auto)    8 % (0-9) 


 


Eosinophils (%) (Auto)    2 % (0-3) 


 


Basophils (%) (Auto)    0 % (0-3) 


 


Neutrophils # (Auto)


  


  


  


  8.3 x10^3uL


(1.8-7.7)


 


Lymphocytes # (Auto)


  


  


  


  2.6 x10^3/uL


(1.0-4.8)


 


Monocytes # (Auto)


  


  


  


  1.0 x10^3/uL


(0.0-1.1)


 


Eosinophils # (Auto)


  


  


  


  0.3 x10^3/uL


(0.0-0.7)


 


Basophils # (Auto)


  


  


  


  0.0 x10^3/uL


(0.0-0.2)


 


Sodium Level


  


  


  


  137 mmol/L


(136-145)


 


Potassium Level


  


  


  


  3.2 mmol/L


(3.5-5.1)


 


Chloride Level


  


  


  


  97 mmol/L


()


 


Carbon Dioxide Level


  


  


  


  28 mmol/L


(21-32)


 


Anion Gap    12 (6-14) 


 


Blood Urea Nitrogen


  


  


  


  69 mg/dL


(8-26)


 


Creatinine


  


  


  


  4.1 mg/dL


(0.7-1.3)


 


Estimated GFR


(Cockcroft-Gault) 


  


  


  15.3 


 


 


BUN/Creatinine Ratio    17 (6-20) 


 


Glucose Level


  


  


  


  180 mg/dL


(70-99)


 


Calcium Level


  


  


  


  8.4 mg/dL


(8.5-10.1)


 


Total Bilirubin


  


  


  


  0.3 mg/dL


(0.2-1.0)


 


Aspartate Amino Transf


(AST/SGOT) 


  


  


  24 U/L (15-37) 


 


 


Alanine Aminotransferase


(ALT/SGPT) 


  


  


  18 U/L (16-63) 


 


 


Alkaline Phosphatase


  


  


  


  84 U/L


()


 


Total Protein


  


  


  


  6.9 g/dL


(6.4-8.2)


 


Albumin


  


  


  


  2.6 g/dL


(3.4-5.0)


 


Albumin/Globulin Ratio    0.6 (1.0-1.7) 


 


Test


  10/8/17


06:49 10/8/17


08:14 10/8/17


11:44 


 


 


Glucose (Fingerstick)


  147 mg/dL


(70-99) 140 mg/dL


(70-99) 278 mg/dL


(70-99) 


 

















AIMEE ESCALANTE MD Oct 8, 2017 12:09

## 2017-10-09 NOTE — PDOC
Progress Note


Subjective


Subjective


Doing very well. Normotensive and in sinus rhythm. On room air. No issues





ROS


ROS


No nausea


No vomiting


No pain


No rash


No SOB





Vital Sign


Vital Signs





Vital Signs








  Date Time  Temp Pulse Resp B/P (MAP) Pulse Ox O2 Delivery O2 Flow Rate FiO2


 


10/9/17 04:06 97.6 75 16 104/53 (70) 95 Room Air  





 97.6       











Physical Exam


PHYSICAL EXAM


GENERAL:  NAD, Alert


HEENT:  PERRL, OC/OP


NECK:  Supple, no JVD, no LN


LUNGS:  Clear


HEART:  S1S2, no gallop, no murmur


ABD:  Soft, NT, no organomegaly, no rebound


EXT:  No edema, no cyanosis


CNS:  Alert, oriented x 3, no focal neurologic deficit


SKIN:  No rash


IV: ok





Labs


Lab





Laboratory Tests








Test


  10/8/17


11:44 10/8/17


17:25 10/8/17


20:46 10/9/17


06:11


 


Glucose (Fingerstick)


  278 mg/dL


(70-99) 179 mg/dL


(70-99) 317 mg/dL


(70-99) 119 mg/dL


(70-99)











Objective


Assessment


POD#5, s/p CABG x 3 (LIMA to LAD, SVG to RPDA, SVG to OM). 


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





Doing very well. Normotensive and in sinus rhythm. On room air. No issues





Plan


Plan of Care


D/c home today


ASA, b blocker and statin


Stop amiodarone 


F/u in clinic in 3 weeks











MATTIE RAIN MD Oct 9, 2017 08:24

## 2017-10-09 NOTE — PDOC
Renal-Progress Notes


Subjective Notes


Notes


NO NEW COMPLAINTS, FEELING WELL





History of Present Illness


Hx of present illness


STABLE





Vitals


Vitals





Vital Signs








  Date Time  Temp Pulse Resp B/P (MAP) Pulse Ox O2 Delivery O2 Flow Rate FiO2


 


10/9/17 08:53      Room Air  


 


10/9/17 08:52  91  199/93    


 


10/9/17 08:31 98.6  20  96   





 98.6       








Weight


Weight [ ]





I.O.


Intake and Output











Intake and Output 


 


 10/10/17





 07:00


 


Intake Total 360 ml


 


Balance 360 ml


 


 


 


Intake Oral 360 ml











Labs


Labs





Laboratory Tests








Test


  10/8/17


11:44 10/8/17


17:25 10/8/17


20:46 10/9/17


06:11


 


Glucose (Fingerstick)


  278 mg/dL


(70-99) 179 mg/dL


(70-99) 317 mg/dL


(70-99) 119 mg/dL


(70-99)


 


Test


  10/9/17


08:30 10/9/17


08:34 


  


 


 


White Blood Count


  13.3 x10^3/uL


(4.0-11.0) 


  


  


 


 


Red Blood Count


  2.97 x10^6/uL


(4.30-5.70) 


  


  


 


 


Hemoglobin


  8.4 g/dL


(13.0-17.5) 


  


  


 


 


Hematocrit


  25.7 %


(39.0-53.0) 


  


  


 


 


Mean Corpuscular Volume 87 fL ()    


 


Mean Corpuscular Hemoglobin 28 pg (25-35)    


 


Mean Corpuscular Hemoglobin


Concent 33 g/dL


(31-37) 


  


  


 


 


Red Cell Distribution Width


  14.5 %


(11.5-14.5) 


  


  


 


 


Platelet Count


  399 x10^3/uL


(140-400) 


  


  


 


 


Neutrophils (%) (Auto) 60 % (31-73)    


 


Lymphocytes (%) (Auto) 28 % (24-48)    


 


Monocytes (%) (Auto) 9 % (0-9)    


 


Eosinophils (%) (Auto) 3 % (0-3)    


 


Basophils (%) (Auto) 1 % (0-3)    


 


Neutrophils # (Auto)


  8.0 x10^3uL


(1.8-7.7) 


  


  


 


 


Lymphocytes # (Auto)


  3.7 x10^3/uL


(1.0-4.8) 


  


  


 


 


Monocytes # (Auto)


  1.1 x10^3/uL


(0.0-1.1) 


  


  


 


 


Eosinophils # (Auto)


  0.4 x10^3/uL


(0.0-0.7) 


  


  


 


 


Basophils # (Auto)


  0.1 x10^3/uL


(0.0-0.2) 


  


  


 


 


Sodium Level


  137 mmol/L


(136-145) 


  


  


 


 


Potassium Level


  3.8 mmol/L


(3.5-5.1) 


  


  


 


 


Chloride Level


  99 mmol/L


() 


  


  


 


 


Carbon Dioxide Level


  25 mmol/L


(21-32) 


  


  


 


 


Anion Gap 13 (6-14)    


 


Blood Urea Nitrogen


  79 mg/dL


(8-26) 


  


  


 


 


Creatinine


  4.0 mg/dL


(0.7-1.3) 


  


  


 


 


Estimated GFR


(Cockcroft-Gault) 15.8 


  


  


  


 


 


Glucose Level


  100 mg/dL


(70-99) 


  


  


 


 


Calcium Level


  8.4 mg/dL


(8.5-10.1) 


  


  


 


 


Glucose (Fingerstick)


  


  97 mg/dL


(70-99) 


  


 











Review of Systems


Constitutional:  yes: other (SLEEPING)


Musculoskeletal:  Yes: muscle stiffness





Physical Exam


General Appearance:  no apparent distress


Skin:  warm


Respiratory:  bilateral CTA


Heart:  S1S2


Abdomen:  soft, bowel sounds present


Genitourinary:  bladder flat


Extremities:  pulses present


Neurology:  alert, oriented





Assessment


Assessment


IMP





SEVERE CAD


ANEMIA


DM II


HTN


ESRD


LEFT CAROTID ARTERY REPAIR


S/P CABG 3 VESSELS





PLAN





HD TODAY


UF TO DW


CONT JOANNE


HOME TODAY











RACHEAL GUERRERO MD Oct 9, 2017 11:17

## 2017-10-09 NOTE — PDOC3
Discharge Summary [facility]


Date of Admission:  Sep 27, 2017


Discharge Date:  Oct 9, 2017


Admitting Diagnosis


CAD, unstable angina, ESRD, DM, HTN, L BKA, PVD


Problems:  


Final Diagnosis


CAD, CABG, ESRD, DM, HTN, L BKA, PVD


CONSULTS


Dr. Mcmanus, Dr. Lopez, Nephrology


Procedures


Cath, CABG


Brief Hospital Course


Mr. Bautista  is a 53 old with PMH of CAD, ESRD on dialysis, uncontrolled 

insulin dependent DM, HTN, L BKA and PVD who was admitted 9/27/17 after having 

chest pain while at dialysis. He was taken to cath by Dr. Mcmanus who found 

multivessel disease and felt that CABG would be more appropriate. In the interim

, an aortic balloon pump was placed, while awaiting CABG. Dr. Lopez 

completed a CABG x 3 vessels (LIMA to LAD, SVG to RPDA, SVG to OM) on 10/4/17 

with routine postoperative course. The greater saphenous vein was harvested 

from his right leg for the procedure. During his hospitalization, he continued 

to have routine dialysis. On day of discharge, he was feeling well, able to 

walk the halls and eating well. He was agreeable to discharge. He will have 

follow up with [facility], [facility] and follow up in our clinic in 1 

week. Ranexa and Imdur were discontinued by Cards. Trazodone was started for 

insomnia. Recheck hgb at follow up.


Patient History:  


Family history: Cardiomyopathy (situation)


  33 FATHER


  32 MOTHER


  G8 SON


Family history: Cardiovascular disease (situation)


  33 FATHER


  32 MOTHER


  G8 SISTER


Family history: Diabetes mellitus (situation)


  33 FATHER


  32 MOTHER


  G8 SISTER


Family history: Hypertension (situation)


  33 FATHER


  32 MOTHER


  G8 SON


  G8 SISTER





No Family History of:


  Cancer confirmed (situation)


  Family history: Allergy


  Family history: Alzheimer's disease (situation)


  Family history: Angina (situation)


  Family history: Asthma


  Family history: Autoimmune disease (situation)


  Family history: Blood disorder (situation)


  Family history: Breast disease (situation)


  Family history: Crohn's disease (situation)


  Family history: Depression (situation)


  Family history: Epilepsy (situation)


  Family history: Gallbladder disease (situation)


  Family history: Gastrointestinal disease (situation)


  Family history: Hemophilia (situation)


  Family history: Obesity (situation)


  Family history: Schizophrenia (situation)


  Family history: Sickle cell trait (situation)


  Family history: Suicide (situation)


  Family history: neoplasm - trachea/bronchus/lung (situation)


  Family history: neoplasm - urinary organ (situation)


  Family history: neoplasm of skin (situation)


  Malignant hyperthermia


  Sleep apnea


Problems:  


Disposition


Home with self care


CONDITION AT DISCHARGE:  Stable


Diet


Cardiac/renal/diabetic diet


Scheduled


Amlodipine Besylate (Amlodipine Besylate), 10 MG PO DAILY, (Reported)


Aspirin (Aspirin), 1 TAB PO DAILY, (Reported)


Carvedilol (Carvedilol), 25 MG PO BID, (Reported)


Clopidogrel Bisulfate (Clopidogrel), 1 TAB PO DAILY, (Reported)


Gabapentin (Gabapentin), 300 MG PO DAILY


Losartan Potassium (Cozaar), 100 MG PO DAILY08


Nph, Human Insulin Isophane (Humulin N Kwikpen), 40 UNIT SQ BIDAC, (Reported)


Oxycodone/Apap  (Percocet  Mg Tablet), 1 TAB PO QID, (Reported)


Sodium Bicarbonate (Sodium Bicarbonate), 650 MG PO TID





Scheduled PRN


Albuterol Sulfate (Ventolin Hfa Inhaler), 2 PUFF INH Q4HRS PRN for SHORTNESS OF 

BREATH, (Reported)





Discontinued Medications


Isosorbide Mononitrate (Isosorbide Mononitrate), 60 MG PO DAILY, (Reported)


Ranolazine (Ranexa), 1 TAB PO BID, (Reported)


Follow Up


with Dr. Oliveros or Dr. Goff in 1 week











MARY OLIVEROS MD Oct 9, 2017 16:18

## 2017-10-19 NOTE — RAD
EXAM: Chest 2 views.



HISTORY: Coronary artery bypass grafting.



COMPARISON: 10/7/2017.



FINDINGS: Frontal and lateral views of the chest are obtained. There are

changes of coronary artery bypass grafting. A right internal jugular

hemodialysis catheter has its tip within the distal superior vena cava.



There are no confluent infiltrates. There is a small left pleural effusion

with mild left basilar atelectasis. The heart is mildly enlarged.    



IMPRESSION:

1. Small left pleural effusion. Mild cardiomegaly.

## 2017-11-11 NOTE — PHYS DOC
Past Medical History


Past Medical History:  CAD, Diabetes-Type II, Hypertension, Renal Disease, 

Renal Failure, Other


Additional Past Medical Histor:  MILD MI?


Past Surgical History:  Other


Additional Past Surgical Histo:  L BKA, L ARM FISTULA, SHUNT R CHEST, triple 

bypass.


Alcohol Use:  None


Drug Use:  None





Adult General


Chief Complaint


Chief Complaint:  POST-OP PROBLEM





HPI


HPI





Patient is a 53  year old male dialysis patient, diabetic, who recently had 

coronary artery bypass grafting with right saphenous vein endoscopic retrieval. 

He presents today with his right medial thigh surgical site concern. Patient 

states this surgical site never did heal up and has continued to have some 

drainage, this morning he had some bloody drainage from the site. He has had 

some pain in his right thigh especially when he walks. Denies fever or chills, 

denies vomiting. He has had diarrhea off and on for maybe even 2 or 3 months. 

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 

especially concerned about his right leg. Patient does have a wound clinic 

appointment coming up on Tuesday, 11/14. He has not seen his surgeon about this 

postoperative problem.





PCP Dr. Hossein Matute.





Review of Systems


Review of Systems





Constitutional: Denies fever or chills []


Respiratory: Denies cough or shortness of breath []


Cardiovascular: Denies chest pain


GI: As in history of present illness





All other systems were reviewed and found to be within normal limits, except as 

documented in this note.





Allergies


Allergies





Allergies








Coded Allergies Type Severity Reaction Last Updated Verified


 


  No Known Drug Allergies    10/4/17 No











Physical Exam


Physical Exam





Constitutional: Well developed, well nourished, no acute distress, non-toxic 

appearance. Alert, appropriate, warm and dry.


HENT: Normocephalic, atraumatic, bilateral external ears normal,  nose normal. [

]


Eyes: conjunctiva normal, no discharge. [] 


Neck: Normal range of motion,  no stridor. [] 


Skin: Warm, dry, no erythema, no rash. [] 


Extremities: No tenderness, no cyanosis, no clubbing, ROM intact, no edema. 

Left BKA present. Right leg: There is an approximately 2 cm incision on the 

medial right thigh that appears to be chronically not healed. There is 

surrounding induration but not significant tenderness to palpation, no redness, 

no warmth, no evidence of cellulitis. The incision is draining some clear to 

bloody drainage, no purulent drainage is noted. The slightly distal smaller 

incision is healed and has a scab. There is no evidence of cellulitis of the 

thigh whatsoever. The calf is soft. The foot is warm. I could not palpate a 

good DP pulse but is warm with good color.


Neurologic: Alert and oriented X 3, normal motor function,  no focal deficits 

noted. []





Current Patient Data


Vital Signs





 Vital Signs








  Date Time  Temp Pulse Resp B/P (MAP) Pulse Ox O2 Delivery O2 Flow Rate FiO2


 


11/11/17 14:35  90 20 119/66 (83) 100 Room Air  


 


11/11/17 13:48 98.8       





 98.8       











EKG


EKG


[]





Radiology/Procedures


Radiology/Procedures


[]





Course & Med Decision Making


Course & Med Decision Making


Pertinent Labs and Imaging studies reviewed. (See chart for details)





53-year-old male, diabetic, status post left BKA, dialysis patient, presents 

several weeks after right endoscopic saphenous vein harvest with the endoscopic 

incision not healing. There is no evidence of acute cellulitis. I discussed the 

case with Dr. Hanks, on call for thoracic surgery. He wants the patient to 

come to their office on Monday to be seen. We agreed to start the patient on 

antibiotics and pack the incision between now and then.





The incision was packed with iodoform gauze by ED nursing staff. It was 

bandaged. We will start him on Bactrim for a possible low-level infection 

although there is no evidence of acute cellulitis. Patient is nontoxic. I 

believe he is stable to be treated as an outpatient. He was also encouraged to 

keep his wound clinic appointment that he has coming up on 11/14. See 

instructions for plan.





[]





Dragon Disclaimer


Dragon Disclaimer


This electronic medical record was generated, in whole or in part, using a 

voice recognition dictation system.





Departure


Departure


Impression:  


 Primary Impression:  


 Nonhealing surgical wound


Disposition:  01 HOME, SELF-CARE


Admitting Physician:  Other


Condition:  STABLE


Referrals:  


HOSSEIN MATUTE MD (PCP)








JUAN HANKS MD





Additional Instructions:  


As we discussed, I discussed your situation with Dr. Hanks, one of the 

surgeons. He wants you to call the office on Monday morning and get into the 

office to be seen on Monday.





Until then, leave the dressing in place that was put on today.





We will start you on an antibiotic, Bactrim, as directed. Drink plenty of 

fluids while you're taking this.


Scripts


Sulfamethoxazole/Trimethoprim (BACTRIM 400-80 MG TABLET) 1 Each Tablet


1 TAB PO BID for leg wound post op, #20 TAB


   Prov: MARYANN MARCUS MD         11/11/17











MARYANN MARCUS MD Nov 11, 2017 14:00

## 2017-12-21 NOTE — EKG
[facility]

               [address] [place] [address]

Test Date:    2017               Test Time:    16:27:29

Pat Name:     ARABELLA WASHINGTON           Department:   

Patient ID:   [id number]           Room:          

Gender:       M                        Technician:   AGUSTIN

:          1964               Requested By: JEM MATUTE

Order Number: [id number]            Reading MD:     

                                 Measurements

Intervals                              Axis          

Rate:         92                       P:            49

LA:           198                      QRS:          -42

QRSD:         86                       T:            48

QT:           348                                    

QTc:          435                                    

                           Interpretive Statements

SINUS RHYTHM

ABNORMAL LEFT AXIS DEVIATION

LEFT ANTERIOR FASCICULAR BLOCK

QRS(T) CONTOUR ABNORMALITY

CONSIDER ANTEROSEPTAL MYOCARDIAL DAMAGE

ABNORMAL ECG

RI6.01

Compared to ECG 10/05/2017 03:22:16

Sinus tachycardia no longer present

## 2017-12-21 NOTE — OP
DATE OF SURGERY:  12/21/2017



PREOPERATIVE DIAGNOSIS:  Renal failure with compromised left forearm AV fistula

flow volumes.



POSTOPERATIVE DIAGNOSIS:  Renal failure with compromised left forearm AV fistula

flow volumes.



PROCEDURE PERFORMED:  Ligation of side branch in the mid forearm proximal to the

radiocephalic arteriovenous fistula.



INDICATIONS:  This is a 53-year-old male with end-stage renal disease.  He has

had a previous Meredith fistula placed on the left, this needed to be required to

more proximal radial arteries.  The flow through the radial artery was not

supporting adequate flow volumes.  Since that time, he has had an excellent

palpable fistula, particularly with proximal compression, but flow volumes have

remained low and he does have a single side branch, which was competing for

outflow.  He was brought to the operating room today for side branch ligation

and hope that this will significantly improve fistula flow and allow for

long-term hemodialysis needs.



DESCRIPTION OF PROCEDURE:  The patient was brought to the operating room,

general anesthetic was administered.  The fistula was examined with a handheld

Doppler and the side branch was ligated and marked.  Longitudinal incision was

made over the top of the fistula and the vein was isolated proximally and

distally and the side branch, which was a large side branch, was isolated,

ligated with 3-0 silk suture.



The fistula was reexamined.  No additional side branches were noted and the

wound was then anesthetized with 0.25% Marcaine and closed with a running 3-0

Vicryl.  Dermabond was placed and a sterile dressing was placed over the top of

this.  The patient moved to recovery in satisfactory stable condition.

 



______________________________

REAL NEWMAN MD



DR:  ERIC/tanya  JOB#:  [id number] / [id number]

DD:  12/21/2017 15:05  DT:  12/21/2017 15:29

## 2017-12-21 NOTE — PDOC
VASCULAR BRIEF OPERATIVE NOTE


Date:  Dec 21, 2017


Pre-Op Diagnosis


End-stage renal disease


Post-Op Diagnosis


Same


Procedure Performed


Ligation of side branch left forearm arteriovenous fistula


Surgeon


Kena


Anesthesia Type:  MAC, Local











REAL NEWMAN MD Dec 21, 2017 14:15

## 2018-08-16 NOTE — PHYS DOC
Past Medical History


Past Medical History:  CAD, Diabetes-Type II, Hypertension, MI, Renal Disease, 

Renal Failure


Additional Past Medical Histor:  MILD MI?


Past Surgical History:  Coronary Bypass Surgery


Additional Past Surgical Histo:  BKA LEFT, RIGHT SIDE PORT, FISTULA LEFT ARM


Alcohol Use:  Rarely


Drug Use:  None





Adult General


Chief Complaint


Chief Complaint:  BLOOD SUGAR PROBLEM





HPI


HPI





Patient is a 54  year old male with a history of hypertension, high cholesterol

, diabetes type 2, renal failure on dialysis Monday Wednesday Friday's who 

presents today complaining of hyperglycemia, patient states his blood glucose 

of been in the 400s all day today. Patient states he has tried using his 

insulin but his blood glucose is not coming down. He was last dialyzed yesterday





Review of Systems


Review of Systems





Constitutional: Denies fever or chills []


Eyes: Denies change in visual acuity, redness, or eye pain []


HENT: Denies nasal congestion or sore throat []


Respiratory: Denies cough or shortness of breath []


Cardiovascular: No additional information not addressed in HPI []


GI: Denies abdominal pain, nausea, vomiting, bloody stools or diarrhea []


: Denies dysuria or hematuria []


Musculoskeletal: Denies back pain or joint pain []


Integument: Denies rash or skin lesions []


Neurologic: Denies headache, focal weakness or sensory changes []


Endocrine: Reports hyperglycemia





All other systems were reviewed and found to be within normal limits, except as 

documented in this note.





Current Medications


Current Medications





Current Medications








 Medications


  (Trade)  Dose


 Ordered  Sig/Yisel  Start Time


 Stop Time Status Last Admin


Dose Admin


 


 Insulin Human


 Regular


  (HumuLIN R VIAL)  8 unit  1X  ONCE  8/16/18 17:15


 8/16/18 17:16 DC 8/16/18 17:22


8 UNIT


 


 Sodium Chloride  1,000 ml @ 


 1,000 mls/hr  1X  ONCE  8/16/18 16:45


 8/16/18 17:44 DC 8/16/18 17:02


1,000 MLS/HR











Allergies


Allergies





Allergies








Coded Allergies Type Severity Reaction Last Updated Verified


 


  No Known Drug Allergies    12/21/17 No











Physical Exam


Physical Exam





Constitutional: Well developed, well nourished, no acute distress, non-toxic 

appearance. []


HENT: Normocephalic, atraumatic, bilateral external ears normal, oropharynx 

moist, no oral exudates, nose normal. []


Eyes: PERRLA, EOMI, conjunctiva normal, no discharge. [] 


Neck: Normal range of motion, no tenderness, supple, no stridor. [] 


Cardiovascular:Heart rate regular rhythm, no murmur []


Lungs & Thorax:  Bilateral breath sounds clear to auscultation []


Abdomen: Bowel sounds normal, soft, no tenderness, no masses, no pulsatile 

masses. [] 


Skin: Warm, dry, no erythema, no rash. [] 


Back: No tenderness, no CVA tenderness. [] 


Extremities: No tenderness, no cyanosis, no clubbing, ROM intact, no edema. 

Left BKA noted


Neurologic: Alert and oriented X 3, normal motor function, normal sensory 

function, no focal deficits noted. 


Psychologic: Affect normal, judgement normal, mood normal. []





Current Patient Data


Vital Signs





 Vital Signs








  Date Time  Temp Pulse Resp B/P (MAP) Pulse Ox O2 Delivery O2 Flow Rate FiO2


 


8/16/18 16:06 99.0 75 22 102/59 (73) 95 Room Air  





 99.0       








Lab Values





 Laboratory Tests








Test


 8/16/18


16:12 8/16/18


17:48


 


White Blood Count


 7.4 x10^3/uL


(4.0-11.0) 





 


Red Blood Count


 3.73 x10^6/uL


(4.30-5.70)  L 





 


Hemoglobin


 10.8 g/dL


(13.0-17.5)  L 





 


Hematocrit


 32.1 %


(39.0-53.0)  L 





 


Mean Corpuscular Volume


 86 fL ()


 





 


Mean Corpuscular Hemoglobin 29 pg (25-35)   


 


Mean Corpuscular Hemoglobin


Concent 34 g/dL


(31-37) 





 


Red Cell Distribution Width


 14.9 %


(11.5-14.5)  H 





 


Platelet Count


 241 x10^3/uL


(140-400) 





 


Neutrophils (%) (Auto) 52 % (31-73)   


 


Lymphocytes (%) (Auto) 34 % (24-48)   


 


Monocytes (%) (Auto) 10 % (0-9)  H 


 


Eosinophils (%) (Auto) 3 % (0-3)   


 


Basophils (%) (Auto) 1 % (0-3)   


 


Neutrophils # (Auto)


 3.8 x10^3uL


(1.8-7.7) 





 


Lymphocytes # (Auto)


 2.5 x10^3/uL


(1.0-4.8) 





 


Monocytes # (Auto)


 0.7 x10^3/uL


(0.0-1.1) 





 


Eosinophils # (Auto)


 0.3 x10^3/uL


(0.0-0.7) 





 


Basophils # (Auto)


 0.1 x10^3/uL


(0.0-0.2) 





 


Sodium Level


 132 mmol/L


(136-145)  L 





 


Potassium Level


 3.7 mmol/L


(3.5-5.1) 





 


Chloride Level


 93 mmol/L


()  L 





 


Carbon Dioxide Level


 32 mmol/L


(21-32) 





 


Anion Gap 7 (6-14)   


 


Blood Urea Nitrogen


 41 mg/dL


(8-26)  H 





 


Creatinine


 4.7 mg/dL


(0.7-1.3)  H 





 


Estimated GFR


(Cockcroft-Gault) 13.1  


 





 


Glucose Level


 401 mg/dL


(70-99)  H 





 


Calcium Level


 8.4 mg/dL


(8.5-10.1)  L 





 


Glucose (Fingerstick)


 


 284 mg/dL


(70-99)  H





 Laboratory Tests


8/16/18 16:12








 Laboratory Tests


8/16/18 16:12














EKG


EKG


[]





Radiology/Procedures


Radiology/Procedures


[]





Course & Med Decision Making


Course & Med Decision Making


Pertinent Labs and Imaging studies reviewed. (See chart for details)





This is a 54-year-old male on end-stage renal disease who presents today 

complaining of high blood glucose. BMP in the ED with glucose of 401, anion gap 

is normal. Creatinine 4.7, patient has history of renal failure currently on 

dialysis Monday Wednesday Friday. He was last dialyzed yesterday. Given insulin 

in the ED, given IV fluids. Patient requesting to stay. Glucose 286 right now. 





17:53 Will admit under Dr Denver Flood Disclaimer


Remigio Disclaimer


This electronic medical record was generated, in whole or in part, using a 

voice recognition dictation system.





Departure


Departure


Impression:  


 Primary Impression:  


 End stage renal disease


 Additional Impression:  


 Hyperglycemia


Disposition:  09 ADMITTED AS INPATIENT


Condition:  STABLE


Referrals:  


ARIC MATUTE MD (PCP)





Problem Qualifiers











STEFFANY HAYES APRN Aug 16, 2018 17:37

## 2018-08-17 NOTE — DISCH
DISCHARGE INSTRUCTIONS


Condition on Discharge


Condition on Discharge:  Stable





Activity After Discharge


Activity Instructions for Disc:  No restrictions, Activity as tolerated


Lifting Instructions after Dis:  No heavy lifting


Exercise Instruction after Dis:  Progress as tolerated


Driving Instructions after Dis:  Do not drive today


Weight Bearing Status after Di:  As tolerated





Diet after Discharge


Diet after Discharge:  Renal Dialysis, Regular


Diet Texture:  Regular


Liquid Texture:  Thin Liquid


Swallowing Supervision:  None needed





Wound Incision Care


Wound/Incision Care:  Keep wound/cast CDI





Checks after Discharge


Checks after discharge:  Check blood press - daily





Follow-Up


Follow up with:  as ana lilialed





Treatment/Equipment after DC


Adaptive Equipment Issued:  None











ARIC MATUTE MD Aug 17, 2018 08:23

## 2018-08-17 NOTE — SSS
ADMIT DATE:  08/16/2018



23-hour discharge



HISTORY OF PRESENT ILLNESS:  The patient is a 54-year-old  male on

dialysis and insulin-dependent diabetes with good control.  He ate two Twinkies

at home and his blood sugar went up to 400 and he came to the ER for evaluation.

 ER evaluation was unremarkable.  He was given some extra insulin and Lantus. 

This morning, his blood sugars are down to around 200 and he is comfortable,

will be discharged and followed as an outpatient.



FINAL DIAGNOSIS:  Dietary hyperglycemia.



OPERATIONS, PROCEDURES, COMPLICATIONS AND CONSULTATIONS:  None.



DISPOSITION:  All home meds remain the same including insulin doses at 55 or 40

of the Novolin N.  Continue diabetic diet, restrictions encouraged.  Office

followup is scheduled.

 



______________________________

ARIC MATUTE MD



DR:  ASHLI/tanya  JOB#:  [id number] / [id number]

DD:  08/17/2018 08:25  DT:  08/17/2018 09:00

## 2019-02-01 NOTE — RAD
Two-view chest dated 2/1/2019.

 

Comparison made to 6/28/2018.

 

Clinical indication: Chest pain.

 

FINDINGS:

 

PA and lateral views obtained. Heart and mediastinal contours are stable. 

Patient is status post median sternotomy, unchanged.

 

Lungs are clear without focal consolidation. Vascular interstitium within 

normal limits. No pleural effusion or pneumothorax.

 

IMPRESSION:

 

No acute radiographic abnormality. Stable findings compared to 6/28/2018.

 

Electronically signed by: Jim Patricio MD (2/1/2019 3:51 PM) 

[facility]

## 2019-02-01 NOTE — PHYS DOC
Past Medical History


Past Medical History:  CAD, Diabetes-Type II, Hypertension, MI, Renal Disease, 

Renal Failure, Other


Additional Past Medical Histor:  MILD MI,ESRD,CHRONIC PAIN


Past Surgical History:  Coronary Bypass Surgery, Other


Additional Past Surgical Histo:  BKA LEFT, RIGHT SIDE PORT, FISTULA LEFT ARM


Smoking:  Quit Greater Than 1 Year


Alcohol Use:  Rarely


Drug Use:  None





Adult General


Chief Complaint


Chief Complaint:  CHEST PAIN





HPI


HPI


53 y/o male presents with report of left sided chest pain which started at 

approximately 1400 today while patient was at hemodialysis.  Denies known 

trauma.  Denies SOA or radiation of pain.  Denies nausea or diaphoresis.  

Patient reports significant cardiac risk factors including DM, HTN, CAD, and 

former smoker.  Denies swelling of right LE or pain.  (history of left BKA)





Review of Systems


Review of Systems





Constitutional: Denies fever or chills []


Eyes: Denies change in visual acuity, redness, or eye pain []


HENT: Denies nasal congestion or sore throat []


Respiratory: Denies cough or shortness of breath []


Cardiovascular: Reports chest pain; denies palpitations


GI: Denies abdominal pain, nausea, vomiting,  or diarrhea []


: Denies dysuria or hematuria []


Musculoskeletal: Denies back pain or joint pain []


Integument: Denies rash or skin lesions []


Neurologic: Denies headache, focal weakness or sensory changes []





Complete systems were reviewed and found to be within normal limits, except as 

documented in this note.





Current Medications


Current Medications





Current Medications








 Medications


  (Trade)  Dose


 Ordered  Sig/Yisel  Start Time


 Stop Time Status Last Admin


Dose Admin


 


 Aspirin


  (Myles Aspirin)  325 mg  1X  ONCE  2/1/19 15:45


 2/1/19 15:46 DC 2/1/19 16:00


325 MG


 


 Fentanyl Citrate


  (Fentanyl 2ml


 Vial)  50 mcg  1X  ONCE  2/1/19 15:45


 2/1/19 15:46 DC 2/1/19 16:00


50 MCG











Allergies


Allergies





Allergies








Coded Allergies Type Severity Reaction Last Updated Verified


 


  No Known Drug Allergies    12/21/17 No











Physical Exam


Physical Exam





Constitutional: Well developed, well nourished, no acute distress, non-toxic 

appearance. []


HENT: Normocephalic, atraumatic


Eyes: Conjunctiva normal, no discharge. [] 


Neck: Normal range of motion, no tenderness, supple,


Cardiovascular: Heart rate regular rhythm, no murmur []


Lungs & Thorax:  Bilateral breath sounds clear to auscultation []


Abdomen: Soft, no tenderness


Skin: Warm, dry, no erythema, no rash. [] 


Back: No tenderness, no CVA tenderness. [] 


Extremities: No tenderness, ROM intact, left BKA noted, left arm AV fistula with

 good thrill


Neurologic: Alert and oriented X 3, normal motor function, normal sensory 

function, no focal deficits noted. []


Psychologic: Affect normal, judgement normal, mood normal. []





Current Patient Data


Vital Signs





Lab Values





                                Laboratory Tests








Test


 2/1/19


15:15


 


White Blood Count


 11.6 x10^3/uL


(4.0-11.0)  H


 


Red Blood Count


 4.16 x10^6/uL


(4.30-5.70)  L


 


Hemoglobin


 11.6 g/dL


(13.0-17.5)  L


 


Hematocrit


 35.8 %


(39.0-53.0)  L


 


Mean Corpuscular Volume


 86 fL ()





 


Mean Corpuscular Hemoglobin 28 pg (25-35)  


 


Mean Corpuscular Hemoglobin


Concent 32 g/dL


(31-37)


 


Red Cell Distribution Width


 14.4 %


(11.5-14.5)


 


Platelet Count


 315 x10^3/uL


(140-400)


 


Neutrophils (%) (Auto) 72 % (31-73)  


 


Lymphocytes (%) (Auto) 19 % (24-48)  L


 


Monocytes (%) (Auto) 6 % (0-9)  


 


Eosinophils (%) (Auto) 2 % (0-3)  


 


Basophils (%) (Auto) 1 % (0-3)  


 


Neutrophils # (Auto)


 8.3 x10^3uL


(1.8-7.7)  H


 


Lymphocytes # (Auto)


 2.2 x10^3/uL


(1.0-4.8)


 


Monocytes # (Auto)


 0.7 x10^3/uL


(0.0-1.1)


 


Eosinophils # (Auto)


 0.3 x10^3/uL


(0.0-0.7)


 


Basophils # (Auto)


 0.1 x10^3/uL


(0.0-0.2)


 


Prothrombin Time


 13.4 SEC


(11.7-14.0)


 


Prothrombin Time INR 1.1 (0.8-1.1)  


 


PTT


 34 SEC (24-38)





 


Sodium Level


 135 mmol/L


(136-145)  L


 


Potassium Level


 3.9 mmol/L


(3.5-5.1)


 


Chloride Level


 94 mmol/L


()  L


 


Carbon Dioxide Level


 33 mmol/L


(21-32)  H


 


Anion Gap 8 (6-14)  


 


Blood Urea Nitrogen


 19 mg/dL


(8-26)


 


Creatinine


 2.5 mg/dL


(0.7-1.3)  H


 


Estimated GFR


(Cockcroft-Gault) 27.0  





 


BUN/Creatinine Ratio 8 (6-20)  


 


Glucose Level


 145 mg/dL


(70-99)  H


 


Calcium Level


 9.0 mg/dL


(8.5-10.1)


 


Magnesium Level


 1.9 mg/dL


(1.8-2.4)


 


Total Bilirubin


 0.3 mg/dL


(0.2-1.0)


 


Aspartate Amino Transferase


(AST) 26 U/L (15-37)





 


Alanine Aminotransferase (ALT)


 43 U/L (16-63)





 


Alkaline Phosphatase


 97 U/L


()


 


Creatine Kinase


 96 U/L


()


 


Creatine Kinase MB (Mass)


 1.2 ng/mL


(0.0-3.6)


 


Creatine Kinase MB Relative


Index 1.3 % (0-4)  





 


Troponin I Quantitative


 < 0.017 ng/mL


(0.000-0.055)


 


NT-Pro-B-Type Natriuretic


Peptide 3011 pg/mL


(0-124)  H


 


Total Protein


 9.0 g/dL


(6.4-8.2)  H


 


Albumin


 4.1 g/dL


(3.4-5.0)


 


Albumin/Globulin Ratio


 0.8 (1.0-1.7)


L


 


Lipase


 259 U/L


()





                                Laboratory Tests


2/1/19 15:15








                                Laboratory Tests


2/1/19 15:15














EKG


EKG


@1440 NSR at 89bpm, NO ST elevation, LAFB





Radiology/Procedures


Radiology/Procedures


PROCEDURE: CHEST PA & LATERAL





Two-view chest dated 2/1/2019.


 


Comparison made to 6/28/2018.


 


Clinical indication: Chest pain.


 


FINDINGS:


 


PA and lateral views obtained. Heart and mediastinal contours are stable. 


Patient is status post median sternotomy, unchanged.


 


Lungs are clear without focal consolidation. Vascular interstitium within 


normal limits. No pleural effusion or pneumothorax.


 


IMPRESSION:


 


No acute radiographic abnormality. Stable findings compared to 6/28/2018.


 


Electronically signed by: Jim Patricio MD (2/1/2019 3:51 PM) 


[facility]





Course & Med Decision Making


Course & Med Decision Making


Pertinent Labs and Imaging studies reviewed. (See chart for details)





Patient presents with left sided chest pain which started while patient was unde

rgoing dialysis today at 1400.  Patient with significant cardiac risk factors.  

EKG stable.  Labs obtained and posted to chart.  Initial troponin WNL. CXR 

without acute process.  HEART score 5.





Patient requiring admission for further evaluation and treatment.  Discussed 

with Dr. Oliveros (on call for Dr. Matute) who is in agreement with admit.  

Discussed findings and plan with patient, who acknowledges understanding and 

agreement.





Dragon Disclaimer


Dragon Disclaimer


This electronic medical record was generated, in whole or in part, using a voice

 recognition dictation system.





Departure


Departure


Impression:  


   Primary Impression:  


   Chest pain


Disposition:  09 ADMITTED AS INPATIENT


Admitting Physician:  Galilea Oliveros


Condition:  STABLE


Referrals:  


ARIC MAUTTE MD (PCP)





Problem Qualifiers








   Primary Impression:  


   Chest pain


   Chest pain type:  unspecified  Qualified Codes:  R07.9 - Chest pain, 

   unspecified








JIM BURCH DO              Feb 1, 2019 17:04

## 2019-02-01 NOTE — EKG
[facility]

              [address] [place] [address]

Test Date:    2019               Test Time:    14:40:12

Pat Name:     ARABELLA WASHINGTON           Department:   

Patient ID:   [id number]           Room:          

Gender:       M                        Technician:   

:          1964               Requested By: JUAN BURCH

Order Number: [id number]           Reading MD:   Grabiel Shrestha

                                 Measurements

Intervals                              Axis          

Rate:         89                       P:            31

PA:           190                      QRS:          -34

QRSD:         88                       T:            53

QT:           362                                    

QTc:          447                                    

                           Interpretive Statements

SINUS RHYTHM

ABNORMAL LEFT AXIS DEVIATION





Electronically Signed On 2019 9:52:48 CST by Grabiel Shrestha

## 2019-02-02 NOTE — EKG
[facility]

              [address] [place] [address]

Test Date:    2019               Test Time:    18:17:33

Pat Name:     ARABELLA WASHINGTON           Department:   

Patient ID:   [id number]           Room:         204 1

Gender:       M                        Technician:   

:          1964               Requested By: JUAN BURCH

Order Number: [id number]           Reading MD:   Grabiel Shrestha

                                 Measurements

Intervals                              Axis          

Rate:         78                       P:            36

GA:           196                      QRS:          -31

QRSD:         88                       T:            49

QT:           374                                    

QTc:          430                                    

                           Interpretive Statements

SINUS RHYTHM

ABNORMAL LEFT AXIS DEVIATION

LEFT ANTERIOR FASCICULAR BLOCK





Electronically Signed On 2019 9:58:46 CST by Grabiel Shrestha

## 2019-02-02 NOTE — NUR
Pt discharged to home.  Pt alert and oriented at time of d/c.  PT discharge reviewed, no 
questions or concerns.  Pt IV and tele monitor d/c'd.  Pt left with all belongings.  Pt 
denies further needs.

## 2019-02-02 NOTE — PDOC1
H & P


H&P


CHIEF COMPLAINT: Chest pain





HISTORY OF PRESENT ILLNESS:  A 54-year-old white male who is on hemodialysis


through a dialysis catheter, also is an insulin-dependent diabetic with coronary


artery disease s/p CABG and ischemic cardiomyopathy, came in for sharp chest 

pain during dialysis yesterday.


Cardiology was consulted and he was admitted for observation. Troponins x 3 

have been negative. 





PAST MEDICAL HISTORY:  As above





ALLERGIES:  No allergies are known.





FAMILY HISTORY:  Unremarkable.





SOCIAL HISTORY:  , disabled, nonsmoker, nondrinker.





REVIEW OF SYSTEMS:  No other complaints.





OBJECTIVE:


Alert, oriented, no acute distress


RRR


CTAB


Abd soft, NT/ND


S/p L BKA, no edema in RLE





ASSESSMENT/PLAN:





Atypical chest pain


Coronary artery disease status post CABG


Ischemic cardiomyopathy


End-stage renal disease, dependent on dialysis


Insulin-dependent diabetic


Hyperlipidemia


Hypertension





ACS ruled out. Recent stress test was ok.


Cardiology agrees with plan to dc today and f/u as outpt


Pt comfortable to dc home. CP resolved. f/u on 2/11/19 with Dr. Goff as 

previously scheduled, sooner if needed. All home meds remain the same.











MARY CALDERA MD Feb 2, 2019 10:24

## 2019-02-02 NOTE — PDOC2
CONSULT


Date of Consult


Date of Consult


DATE: 2/2/19 


TIME: 11:24





Reason for Consult


Reason for Consult:


Chest pain





Referring Physician


Referring Physician:


Dr. Oliveros





Identification/Chief Complaint


Chief Complaint


Chest pain





Source


Source:  Chart review, Patient





History of Present Illness


Reason for Visit:


54-year-old male with history of coronary artery disease s/p CABG in 2017 

presented with left-sided chest pain that started when he was getting 

hemodialysis for his end-stage renal disease. He stated that the pain felt like 

a cramp and was unlike the pain he had prior to his bypass surgery. He denied 

any orthopnea/PND, palpitations or syncope.





Past Medical History


Cardiovascular:  CAD, HTN, Hyperlipidemia, Other


Pulmonary:  COPD


GI:  Constipation


Heme/Onc:  Anemia NOS


Hepatobiliary:  No pertinent hx


Psych:  No pertinent hx


Rheumatologic:  No pertinent hx


Infectious disease:  No pertinent hx


Renal/:  Chronic renal insuff


Endocrine:  Diabetes, Hyperparathyroidism





Past Surgical History


Past Surgical History:  Other





Family History


Family History:  Diabetes, Hypertension





Social History


ALCOHOL:  none


Lives:  Alone





Current Medications


Current Medications





Current Medications


Aspirin (Myles Aspirin) 325 mg 1X  ONCE PO  Last administered on 2/1/19at 16:00

;  Start 2/1/19 at 15:45;  Stop 2/1/19 at 15:46;  Status DC


Fentanyl Citrate (Fentanyl 2ml Vial) 50 mcg 1X  ONCE IV  Last administered on 2/ 1/19at 16:00;  Start 2/1/19 at 15:45;  Stop 2/1/19 at 15:46;  Status DC


Ondansetron HCl (Zofran) 4 mg PRN Q8HRS  PRN IV NAUSEA/VOMITING;  Start 2/1/19 

at 17:45;  Stop 2/2/19 at 17:44


Fentanyl Citrate (Fentanyl 2ml Vial) 50 mcg PRN Q2HR  PRN IV PAIN;  Start 2/1/ 19 at 17:45;  Stop 2/2/19 at 17:44


Insulin Human Lispro (HumaLOG) 0-5 UNITS TIDWMEALS SQ ;  Start 2/2/19 at 08:00


Dextrose (Dextrose 50%-Water Syringe) 12.5 gm PRN Q15MIN  PRN IV SEE COMMENTS;  

Start 2/1/19 at 17:45


Amlodipine Besylate (Norvasc) 10 mg DAILY PO  Last administered on 2/2/19at 08:

26;  Start 2/2/19 at 09:00


Aspirin (Children'S Aspirin) 81 mg DAILY08 PO  Last administered on 2/2/19at 08:

27;  Start 2/2/19 at 08:00


Atorvastatin Calcium (Lipitor) 20 mg HS PO  Last administered on 2/1/19at 20:53

;  Start 2/1/19 at 21:00


Clopidogrel Bisulfate (Plavix) 75 mg DAILY07 PO  Last administered on 2/2/19at 

08:27;  Start 2/2/19 at 07:00


Cyclobenzaprine HCl (Flexeril) 10 mg TID PRN  PRN PO MUSCLE SPASMS Last 

administered on 2/1/19at 20:53;  Start 2/1/19 at 20:30


Furosemide (Lasix) 40 mg DAILY PO  Last administered on 2/2/19at 08:27;  Start 2 /2/19 at 09:00


Gabapentin (Neurontin) 300 mg DAILY PO  Last administered on 2/2/19at 08:27;  

Start 2/2/19 at 09:00


Losartan Potassium (Cozaar) 50 mg DAILY PO  Last administered on 2/2/19at 08:26

;  Start 2/2/19 at 09:00


Metoprolol Tartrate (Lopressor) 25 mg DAILY PO  Last administered on 2/2/19at 10

:06;  Start 2/2/19 at 09:00


Oxycodone/ Acetaminophen (Percocet 10/325) 1 tab PRN QID  PRN PO PAIN Last 

administered on 2/2/19at 08:32;  Start 2/1/19 at 20:30


Albuterol Sulfate (Ventolin Neb Soln) 2.5 mg PRN Q6HRS  PRN NEB SHORTNESS OF 

BREATH;  Start 2/1/19 at 21:00;  Stop 2/1/19 at 21:00;  Status DC


Insulin Glargine (Lantus) 45 units QHS SQ ;  Start 2/1/19 at 21:00;  Stop 2/1/ 19 at 21:00;  Status DC


Insulin Glargine (Lantus) 55 units DAILY08 SQ  Last administered on 2/2/19at 10:

06;  Start 2/2/19 at 08:00


Albuterol Sulfate (Ventolin Neb Soln) 2.5 mg PRN Q4HRS  PRN NEB SHORTNESS OF 

BREATH;  Start 2/1/19 at 21:00


Insulin Glargine (Lantus) 45 units QHS SQ  Last administered on 2/1/19at 22:34;

  Start 2/1/19 at 21:00





Active Scripts


Active


Gabapentin ** (Gabapentin) 300 Mg Capsule 300 Mg PO DAILY


Reported


Novolin N (Nph, Human Insulin Isophane) 100 Unit/1 Ml Vial 45 Unit SQ HS


Novolin N (Nph, Human Insulin Isophane) 100 Unit/1 Ml Vial 55 Unit SQ DAILY08


Losartan Potassium 50 Mg Tablet 50 Mg PO DAILY


Metoprolol Tartrate 25 Mg Tablet 25 Mg PO DAILY


Lipitor (Atorvastatin Calcium) 20 Mg Tablet 20 Mg PO HS


Amlodipine Besylate 10 Mg Tablet 10 Mg PO DAILY


Children's Aspirin (Aspirin) 81 Mg Tab.chew 81 Mg PO 


Cyclobenzaprine Hcl 10 Mg Tablet 1 Tab PO TID PRN PRN


Furosemide 40 Mg Tablet 1 Tab PO DAILY


Clopidogrel (Clopidogrel Bisulfate) 75 Mg Tablet 1 Tab PO DAILY


Percocet  Mg Tablet ** (Oxycodone/Acetaminophen) 1 Each Tablet 1 Tab PO 

PRN QID PRN


Ventolin Hfa Inhaler (Albuterol Sulfate) 18 Gm Hfa.aer.ad 2 Puff INH Q4HRS PRN





Allergies


Allergies:  


Coded Allergies:  


     No Known Drug Allergies (Unverified , 12/21/17)





ROS


PSYCHOLOGICAL ROS:  No: Hallucinations


Eyes:  No Loss of vision


HEENT:  No: Epistaxis


Respiratory:  No: Hemoptysis, Orthopnea, Shortness of breath


Cardiovascular:  yes Chest Pain


Gastrointestinal:  No Diarrhea, No Melena


Genitourinary:  No Hematuria


Neurological:  No Seizures


Skin:  No Rash





Physical Exam


General:  Alert, Oriented X3


HEENT:  Atraumatic, PERRLA


Lungs:  Clear to auscultation


Heart:  Regular rate, Other ([facility] 1/6 aortic)


Abdomen:  Soft, No tenderness


Extremities:  No edema


Psych/Mental Status:  Mood NL





Vitals


VITALS





Vital Signs








  Date Time  Temp Pulse Resp B/P (MAP) Pulse Ox O2 Delivery O2 Flow Rate FiO2


 


2/2/19 11:06 97.9 70 20 99/52 (68) 97 Room Air  





 97.9       











Labs


Labs





Laboratory Tests








Test


 2/1/19


15:15 2/1/19


21:00 2/1/19


21:07 2/2/19


00:05


 


White Blood Count


 11.6 x10^3/uL


(4.0-11.0) 


 


 





 


Red Blood Count


 4.16 x10^6/uL


(4.30-5.70) 


 


 





 


Hemoglobin


 11.6 g/dL


(13.0-17.5) 


 


 





 


Hematocrit


 35.8 %


(39.0-53.0) 


 


 





 


Mean Corpuscular Volume 86 fL ()    


 


Mean Corpuscular Hemoglobin 28 pg (25-35)    


 


Mean Corpuscular Hemoglobin


Concent 32 g/dL


(31-37) 


 


 





 


Red Cell Distribution Width


 14.4 %


(11.5-14.5) 


 


 





 


Platelet Count


 315 x10^3/uL


(140-400) 


 


 





 


Neutrophils (%) (Auto) 72 % (31-73)    


 


Lymphocytes (%) (Auto) 19 % (24-48)    


 


Monocytes (%) (Auto) 6 % (0-9)    


 


Eosinophils (%) (Auto) 2 % (0-3)    


 


Basophils (%) (Auto) 1 % (0-3)    


 


Neutrophils # (Auto)


 8.3 x10^3uL


(1.8-7.7) 


 


 





 


Lymphocytes # (Auto)


 2.2 x10^3/uL


(1.0-4.8) 


 


 





 


Monocytes # (Auto)


 0.7 x10^3/uL


(0.0-1.1) 


 


 





 


Eosinophils # (Auto)


 0.3 x10^3/uL


(0.0-0.7) 


 


 





 


Basophils # (Auto)


 0.1 x10^3/uL


(0.0-0.2) 


 


 





 


Prothrombin Time


 13.4 SEC


(11.7-14.0) 


 


 





 


Prothromb Time International


Ratio 1.1 (0.8-1.1) 


 


 


 





 


Activated Partial


Thromboplast Time 34 SEC (24-38) 


 


 


 





 


Sodium Level


 135 mmol/L


(136-145) 


 


 





 


Potassium Level


 3.9 mmol/L


(3.5-5.1) 


 


 





 


Chloride Level


 94 mmol/L


() 


 


 





 


Carbon Dioxide Level


 33 mmol/L


(21-32) 


 


 





 


Anion Gap 8 (6-14)    


 


Blood Urea Nitrogen


 19 mg/dL


(8-26) 


 


 





 


Creatinine


 2.5 mg/dL


(0.7-1.3) 


 


 





 


Estimated GFR


(Cockcroft-Gault) 27.0 


 


 


 





 


BUN/Creatinine Ratio 8 (6-20)    


 


Glucose Level


 145 mg/dL


(70-99) 


 


 





 


Calcium Level


 9.0 mg/dL


(8.5-10.1) 


 


 





 


Magnesium Level


 1.9 mg/dL


(1.8-2.4) 


 


 





 


Total Bilirubin


 0.3 mg/dL


(0.2-1.0) 


 


 





 


Aspartate Amino Transf


(AST/SGOT) 26 U/L (15-37) 


 


 


 





 


Alanine Aminotransferase


(ALT/SGPT) 43 U/L (16-63) 


 


 


 





 


Alkaline Phosphatase


 97 U/L


() 


 


 





 


Creatine Kinase


 96 U/L


() 


 


 





 


Creatine Kinase MB (Mass)


 1.2 ng/mL


(0.0-3.6) 


 


 





 


Creatine Kinase MB Relative


Index 1.3 % (0-4) 


 


 


 





 


Troponin I Quantitative


 < 0.017 ng/mL


(0.000-0.055) < 0.017 ng/mL


(0.000-0.055) 


 < 0.017 ng/mL


(0.000-0.055)


 


NT-Pro-B-Type Natriuretic


Peptide 3011 pg/mL


(0-124) 


 


 





 


Total Protein


 9.0 g/dL


(6.4-8.2) 


 


 





 


Albumin


 4.1 g/dL


(3.4-5.0) 


 


 





 


Albumin/Globulin Ratio 0.8 (1.0-1.7)    


 


Lipase


 259 U/L


() 


 


 





 


Glucose (Fingerstick)


 


 


 371 mg/dL


(70-99) 





 


Test


 2/2/19


04:30 2/2/19


07:29 


 





 


Triglycerides Level


 204 mg/dL


(0-150) 


 


 





 


Cholesterol Level


 113 mg/dL


(0-200) 


 


 





 


LDL Cholesterol, Calculated


 39 mg/dL


(0-100) 


 


 





 


VLDL Cholesterol, Calculated


 41 mg/dL


(0-40) 


 


 





 


Non-HDL Cholesterol Calculated


 80 mg/dL


(0-129) 


 


 





 


HDL Cholesterol


 33 mg/dL


(40-60) 


 


 





 


Cholesterol/HDL Ratio 3.4    


 


Glucose (Fingerstick)


 


 140 mg/dL


(70-99) 


 











Laboratory Tests








Test


 2/1/19


15:15 2/1/19


21:00 2/1/19


21:07 2/2/19


00:05


 


White Blood Count


 11.6 x10^3/uL


(4.0-11.0) 


 


 





 


Red Blood Count


 4.16 x10^6/uL


(4.30-5.70) 


 


 





 


Hemoglobin


 11.6 g/dL


(13.0-17.5) 


 


 





 


Hematocrit


 35.8 %


(39.0-53.0) 


 


 





 


Mean Corpuscular Volume 86 fL ()    


 


Mean Corpuscular Hemoglobin 28 pg (25-35)    


 


Mean Corpuscular Hemoglobin


Concent 32 g/dL


(31-37) 


 


 





 


Red Cell Distribution Width


 14.4 %


(11.5-14.5) 


 


 





 


Platelet Count


 315 x10^3/uL


(140-400) 


 


 





 


Neutrophils (%) (Auto) 72 % (31-73)    


 


Lymphocytes (%) (Auto) 19 % (24-48)    


 


Monocytes (%) (Auto) 6 % (0-9)    


 


Eosinophils (%) (Auto) 2 % (0-3)    


 


Basophils (%) (Auto) 1 % (0-3)    


 


Neutrophils # (Auto)


 8.3 x10^3uL


(1.8-7.7) 


 


 





 


Lymphocytes # (Auto)


 2.2 x10^3/uL


(1.0-4.8) 


 


 





 


Monocytes # (Auto)


 0.7 x10^3/uL


(0.0-1.1) 


 


 





 


Eosinophils # (Auto)


 0.3 x10^3/uL


(0.0-0.7) 


 


 





 


Basophils # (Auto)


 0.1 x10^3/uL


(0.0-0.2) 


 


 





 


Prothrombin Time


 13.4 SEC


(11.7-14.0) 


 


 





 


Prothromb Time International


Ratio 1.1 (0.8-1.1) 


 


 


 





 


Activated Partial


Thromboplast Time 34 SEC (24-38) 


 


 


 





 


Sodium Level


 135 mmol/L


(136-145) 


 


 





 


Potassium Level


 3.9 mmol/L


(3.5-5.1) 


 


 





 


Chloride Level


 94 mmol/L


() 


 


 





 


Carbon Dioxide Level


 33 mmol/L


(21-32) 


 


 





 


Anion Gap 8 (6-14)    


 


Blood Urea Nitrogen


 19 mg/dL


(8-26) 


 


 





 


Creatinine


 2.5 mg/dL


(0.7-1.3) 


 


 





 


Estimated GFR


(Cockcroft-Gault) 27.0 


 


 


 





 


BUN/Creatinine Ratio 8 (6-20)    


 


Glucose Level


 145 mg/dL


(70-99) 


 


 





 


Calcium Level


 9.0 mg/dL


(8.5-10.1) 


 


 





 


Magnesium Level


 1.9 mg/dL


(1.8-2.4) 


 


 





 


Total Bilirubin


 0.3 mg/dL


(0.2-1.0) 


 


 





 


Aspartate Amino Transf


(AST/SGOT) 26 U/L (15-37) 


 


 


 





 


Alanine Aminotransferase


(ALT/SGPT) 43 U/L (16-63) 


 


 


 





 


Alkaline Phosphatase


 97 U/L


() 


 


 





 


Creatine Kinase


 96 U/L


() 


 


 





 


Creatine Kinase MB (Mass)


 1.2 ng/mL


(0.0-3.6) 


 


 





 


Creatine Kinase MB Relative


Index 1.3 % (0-4) 


 


 


 





 


Troponin I Quantitative


 < 0.017 ng/mL


(0.000-0.055) < 0.017 ng/mL


(0.000-0.055) 


 < 0.017 ng/mL


(0.000-0.055)


 


NT-Pro-B-Type Natriuretic


Peptide 3011 pg/mL


(0-124) 


 


 





 


Total Protein


 9.0 g/dL


(6.4-8.2) 


 


 





 


Albumin


 4.1 g/dL


(3.4-5.0) 


 


 





 


Albumin/Globulin Ratio 0.8 (1.0-1.7)    


 


Lipase


 259 U/L


() 


 


 





 


Glucose (Fingerstick)


 


 


 371 mg/dL


(70-99) 





 


Test


 2/2/19


04:30 2/2/19


07:29 


 





 


Triglycerides Level


 204 mg/dL


(0-150) 


 


 





 


Cholesterol Level


 113 mg/dL


(0-200) 


 


 





 


LDL Cholesterol, Calculated


 39 mg/dL


(0-100) 


 


 





 


VLDL Cholesterol, Calculated


 41 mg/dL


(0-40) 


 


 





 


Non-HDL Cholesterol Calculated


 80 mg/dL


(0-129) 


 


 





 


HDL Cholesterol


 33 mg/dL


(40-60) 


 


 





 


Cholesterol/HDL Ratio 3.4    


 


Glucose (Fingerstick)


 


 140 mg/dL


(70-99) 


 














Assessment/Plan


Assessment/Plan


1.  Chest pain with atypical features in a patient with known history of 

coronary artery disease s/p CABG in 2017. Myocardial infarction has been ruled 

out based on cardiac enzymes and EKG. 2-D echo in February 2018 showed LVEF 60% 

and Lexiscan nuclear stress test in July 2018 did not show any significant 

ischemia. He is presently chest pain-free. Chest pain noncardiac and most 

probably musculoskeletal. Okay for discharge from cardiac standpoint. Continue 

current secondary prevention measures. Follow-up with our office in 1 month.


2.  Hypertension:  Well-controlled


3.  Hyperlipidemia:  Continue statin therapy


4.  Diabetes mellitus type 2:  Treat per PCP


5.  End-stage renal disease:  Hemodialysis per nephrology team


Thank you for your consultation











TRACY BLAKELY MD Feb 2, 2019 11:26

## 2019-06-19 NOTE — RAD
CT HEAD WITHOUT CONTRAST  6/19/2019 4:00 PM

 

Indication:   Headache.

 

Comparison: CT head without contrast June 28, 2018

 

Procedure: Multidetector CT imaging of the head was performed without the 

administration of contrast.

 

Findings: No evidence of acute intracranial hemorrhage is identified. No 

acute mass effect or midline shift is seen. The ventricles and basilar 

cisterns have a grossly similar configuration. Area of low density in the 

right caudate head and external capsule appear to be similar. There is an 

area of low-density in the right [place] which appears to been present on 

prior exam in retrospect. Artifact is more prominent on prior study. The 

appearance suggests prior lacunar infarcts. Correlate with clinical 

history. No acute osseous changes are noted. No abnormal extra-axial fluid

collections are identified.

 

IMPRESSION: 

 

1.No evidence of acute intracranial abnormality or acute change from prior

exam.

 

2. Grossly stable appearance of small multifocal, hypoattenuating foci 

which could represent sequela of prior lacunar infarcts. Correlate with 

clinical history. If clinical concern persists follow-up MRI may be 

helpful for further characterization.

 

 

 

CT DOSING PQRS STATEMENT: 

One or more of the following individualized dose reduction techniques were

utilized for this examination: 

 1. Automated exposure control 

 2. Adjustment of the mA and/or kV according to patient size  

3. Use of iterative reconstruction technique

 

Electronically signed by: Onesimo Angeles MD (6/19/2019 4:08 PM) [id number]

## 2019-07-09 NOTE — CONS
DATE OF CONSULTATION:  07/09/2019



CHIEF COMPLAINT:  Left below-knee amputation stump pain status post fall.



HISTORY OF PRESENT ILLNESS:  The patient is a 55-year-old male who is status

post a left below-knee amputation back in about 2012 that was a complication of

his diabetes.  He has subsequently worn 2 different prostheses and had a

well-fitting one that was really minimally symptomatic prior to this injury.  He

normally has a socket and a sock that keeps it securely on his leg and for some

reason had the sock down, but turned to feed his cat and his leg came out of the

prosthesis and it caused him to fall onto his left stump yesterday.  He was

initially treated for lacerations to his stump in the Emergency Department and

sent home, but continued to have severe pain, inability to use his prosthesis

and pain was even poorly controlled with the use of 2 Percocet 10/325 pills that

he said just helped for a short while.  He was actually asked to come back in by

Dr. Goff and admitted because an x-ray apparently was suspicious for a

proximal fibula fracture.



PAST MEDICAL HISTORY:  Significant for type 2 diabetes and hypertension.  He has

renal failure, on dialysis; history of previous heart attack.



PAST SURGICAL HISTORY:  Significant for a triple bypass CABG in 2016, left

below-knee amputation in 2012, left arm dialysis shunt and a revascularization

procedure to his right lower extremity previously with Vascular surgery.



SOCIAL HISTORY:  Ambulates independently with his prosthesis prior to his

injury.  Denies tobacco, alcohol or drug use.



ALLERGIES:  He has no known drug allergies.



MEDICATIONS:  List is reviewed.



REVIEW OF SYSTEMS:  Denies any head injury with the fall as the injuries were

isolated to his left below-knee amputation stump where he continues to have

significant pain and some swelling.  He denies any fever or chills.  No

radiating pain, numbness or tingling in the extremities.  He has good use of

both upper extremities and denies any headache, focal weakness, radiating pain,

change in bowel habits.  He is scheduled for dialysis tomorrow.



FAMILY HISTORY:  Noncontributory.



PHYSICAL EXAMINATION:

GENERAL:  A pleasant, cooperative, 55-year-old male, currently examined at his

bedside.

EXTREMITIES:  He has good motion and stability of bilateral shoulders, elbows

and wrists.  Well-healed incisions from previous revascularization to the right

lower extremity, which has good distal palpable pulses and sensation, capillary

refill.  On examination of the left below-knee amputation stump, he is tender

diffusely and has some swelling around the entire stump primarily anterior crest

area of the tibia, but extends around the stump distally.  He has no real point

tenderness over the distal portions of the bone or overlying muscle only

diffusely.  He is mildly tender over the proximal fibula site, but really little

more than the diffuse pain around the rest of the stump.  The knee alignment and

stability as well as motion are intact.  He has normal examination of the

bilateral hips, contralateral right knee and ankle.

SKIN:  He does show 2 small lacerations, one more on the distal aspect of the

stump and one more anteromedial on his stump, both are well closed.  There is no

sign of any drainage, redness or erythema and he has diffuse swelling around the

area of the stump.



IMAGING:  X-rays show well-maintained knee joint with obvious below-knee

amputation.  There is irregularity noted at the proximal fibula that appears

overall acceptably aligned, but somewhat comminuted.  Vascular clips are present

in the distal stump as well.



IMPRESSION:  Left below-knee stump injury status post fall.



TREATMENT PLAN:  Overall, I really do not consider a structural deficit with the

comminuted proximal fibula fracture in his condition.  It is overall acceptably

aligned.  He really has moderate tenderness over the area, which is really

little more than the diffuse tenderness that he has around the entire periphery

of the stump from his contusions.  He does have a couple lacerations that

appeared to be well approximated.  The major issue I see going on is that he has

some swelling that as I discussed with him would likely preclude him from

getting into his socket currently.  He agrees with this based on the appearance

of his stump and the tenderness and I did discuss with him the possibility of

getting a stump  from [facility] to get some of the swelling

down to better be able to manage the use of his prosthetic.  He is certainly

familiar with this process from previously and appreciated the discussion.  He

had all his questions answered.  Pain can be managed symptomatically in the

interim and as this is a nonsurgical condition, I would be happy to follow him

along from any orthopedic needs, but do not anticipate any other intervention

aside from stump  and ongoing symptomatic management and treatment to

get back into his prosthesis as symptomatically tolerated, which can be a

process handled by his prosthetist at [facility].  He has been given

diabetic diet presently and ambulation and transfers as he is able to tolerate

in the interim.

 



______________________________

VALERIE OCAMPO MD



DR:  ELIS/tanya  JOB#:  [id number] / [id number]

DD:  07/09/2019 16:12  DT:  07/09/2019 22:07



ARIC Jin MD

## 2019-07-09 NOTE — RAD
INDICATION: Trauma

 

COMPARISON: None.

 

IMPRESSION: 

 

Left knee:

3 views obtained.

Status post below-the-knee amputation. There are some surgical clips 

within the soft tissues as well as swelling of the subcutaneous fat at the

stump.

Calcific atherosclerosis.

Small joint effusion.

Lucency is seen at the proximal fibula. Would correlate with symptoms in 

the region since a mildly comminuted fracture can have this appearance.

 

Electronically signed by: Clayton Goff MD (7/9/2019 5:54 AM) [id number]

## 2019-07-09 NOTE — PHYS DOC
Past Medical History


Past Medical History:  Diabetes-Type II, Hypertension


Additional Past Medical Histor:  MILD MI,ESRD,CHRONIC PAIN


Past Surgical History:  Other


Additional Past Surgical Histo:  LT ARM DIALYSIS SHUNT, CABG (TRIPLE BYPASS) 

2016 APPROX, L lower  amputaion


Alcohol Use:  None


Drug Use:  None





Adult General


Chief Complaint


Chief Complaint:  LOWER EXT PAIN





HPI


HPI





Patient is a 55  year old male, accompanied by his wife, with complaints of 

uncontrolled pain in his left lower leg and inability to care for himself at 

home after sustaining a fall yesterday. He reports that his left leg came out of

his prosthesis when he turned and caused him to fall onto his left stump. Pt was

seen in the ER and treated for lacerations to the stump of his left BKA and sent

home. An x-ray that was read by radiologist after the patient was discharged 

showed a mildly comminuted fracture of the proximal left fibula. He currently 

rates his pain a 10/10 on the pain scale, he took 2 10/325 mg oxycodone at 0400 

this morning that helped for a while but has since worn off.





Review of Systems


Review of Systems





Constitutional: Denies fever or chills []


Musculoskeletal: see HPI


Integument: sutured lacerations to left BKA stump


Neurologic: Denies headache, focal weakness or sensory changes []








Complete systems were reviewed and found to be within normal limits, except as 

documented in this note.





Current Medications


Current Medications





Current Medications








 Medications


  (Trade)  Dose


 Ordered  Sig/Yisel  Start Time


 Stop Time Status Last Admin


Dose Admin


 


 Oxycodone/


 Acetaminophen


  (Percocet 10/325)  1 tab  1X  ONCE  7/9/19 12:15


 7/9/19 12:16 DC 7/9/19 12:17


1 TAB











Allergies


Allergies





Allergies








Coded Allergies Type Severity Reaction Last Updated Verified


 


  No Known Drug Allergies    12/21/17 No











Physical Exam


Physical Exam





Constitutional: Well developed, well nourished, no acute distress, non-toxic 

appearance. []


HENT: Normocephalic, atraumatic, bilateral external ears normal, oropharynx 

moist, no oral exudates, nose normal. []


Eyes:  conjunctiva normal, no discharge. [] 


Neck: Normal range of motion, no stridor. [] 


Lungs & Thorax: Respirations even and unlabored, no retractions, no respiratory 

distress


Skin: Warm, dry 


Extremities: No cyanosis, no clubbing, ROM intact; L BKA with TTP of proximal 

fibula, no crepitus


Neurologic: Alert and oriented X 3, no focal deficits noted. []


Psychologic: Affect normal, judgement normal, mood normal. []





Current Patient Data


Vital Signs





                                   Vital Signs








  Date Time  Temp Pulse Resp B/P (MAP) Pulse Ox O2 Delivery O2 Flow Rate FiO2


 


7/9/19 12:17   16   Room Air  


 


7/9/19 11:49 99.7 105  149/72 (97) 94   





 99.7       











EKG


EKG


[]





Radiology/Procedures


Radiology/Procedures


PROCEDURE: KNEE LEFT 3V





 


INDICATION: Trauma


 


COMPARISON: None.


 


IMPRESSION: 


 


Left knee:


3 views obtained.


Status post below-the-knee amputation. There are some surgical clips 


within the soft tissues as well as swelling of the subcutaneous fat at the


stump.


Calcific atherosclerosis.


Small joint effusion.


Lucency is seen at the proximal fibula. Would correlate with symptoms in 


the region since a mildly comminuted fracture can have this appearance.[]





Course & Med Decision Making


Course & Med Decision Making


Pertinent Labs and Imaging studies reviewed. (See chart for details)


dx: inadequate pain control, proximal fibula fracture


[]Pt was given 1 10/325 mg oxycodone in the ER. Pt verbalized inability to 

tolerate pain at home and fear of falling due to inability to transfer or 

ambulate in his home. 


1236- Spoke with Dr. Goff who will admit patient for pain control and 

proximal fx of left fibula. Per Dr. Goff will consult ortho Dr. Sparrow is on

 call.





Dragon Disclaimer


Dragon Disclaimer


This electronic medical record was generated, in whole or in part, using a voice

 recognition dictation system.





Departure


Departure


Impression:  


   Primary Impression:  


   Inadequate pain control


   Additional Impression:  


   Fracture, fibula, proximal


Disposition:  09 ADMITTED AS INPATIENT


Admitting Physician:  Hossein Goff


Condition:  STABLE


Referrals:  


HOSSEIN GOFF MD (PCP)





Problem Qualifiers








   Additional Impression:  


   Fracture, fibula, proximal


   Encounter type:  initial encounter  Fracture type:  closed  Fracture 

   morphology:  unspecified fracture morphology  Laterality:  left  Qualified 

   Codes:  S82.832A - Other fracture of upper and lower end of left fibula, 

   initial encounter for closed fracture








PIERRE RODRIGUEZ APRN        Jul 9, 2019 12:42

## 2019-07-09 NOTE — NUR
Faxed order for stump  to [facility] clinic and called for verification. Will notify 
oncoming shift to follow up

## 2019-07-09 NOTE — NUR
Pt admitted after falling at home.  Pt with history of left BKA and fall resulted in left 
fibular fx.  Mobility and self care limited by pain.  Await ortho consult.  Pt would benefit 
from PT/OT assessment after ortho consult complete.  Please write PT/OT eval and treat after 
ortho consult  orders if you agree

-------------------------------------------------------------------------------

Addendum: 07/09/19 at 1438 by WAYNE BASHIR PT

-------------------------------------------------------------------------------

Amended: Links added.

## 2019-07-09 NOTE — NUR
Patient arrived from ER around 1330. No IV access present or ordered to start at this time. 
Sutures intact in stump which were placed in the ER. Medication reviewed with patient upon 
admission. Will continue to monitor.

## 2019-07-09 NOTE — PHYS DOC
Past Medical History


Past Medical History:  Diabetes-Type II, Hypertension


Additional Past Medical Histor:  MILD MI,ESRD,CHRONIC PAIN


Past Surgical History:  Other


Additional Past Surgical Histo:  LT ARM DIALYSIS SHUNT, CABG (TRIPLE BYPASS) 

2016 APPROX


Alcohol Use:  None


Drug Use:  None





Adult General


Chief Complaint


Chief Complaint:  LOWEREXTREMITY INJURY





HPI


HPI





Patient is a 55  year old m had mechanical fall


slipped on the floor bumped stump on wood floor.


positive bleeding a lot but controlled with the pressure of his aka sock.





Review of Systems


Review of Systems








Current Medications


Current Medications





Current Medications








 Medications


  (Trade)  Dose


 Ordered  Sig/Yisel  Start Time


 Stop Time Status Last Admin


Dose Admin


 


 Acetaminophen/


 Hydrocodone Bitart


  (Lortab 5/325)  2 tab  1X  ONCE  7/9/19 01:45


 7/9/19 01:47 DC 7/9/19 01:41


2 TAB


 


 Diphtheria/


 Tetanus/Acell


 Pertussis


  (Boostrix)  0.5 ml  ONCE ONCE  7/9/19 02:00


 7/9/19 02:01 DC 7/9/19 01:41


0.5 ML


 


 Gelatin


  (Gelfoam  Size


 12-7mm)  1 each  STK-MED ONCE  7/9/19 00:49


 7/9/19 00:50 DC  





 


 Lidocaine HCl


  (Lidocaine 1%


 20ml Vial)  20 ml  STK-MED ONCE  7/9/19 00:50


 7/9/19 00:51 DC  














Allergies


Allergies





Allergies








Coded Allergies Type Severity Reaction Last Updated Verified


 


  No Known Drug Allergies    12/21/17 No











Physical Exam


Physical Exam





Constitutional: Well developed, well nourished, no acute distress, non-toxic 

appearance. []


HENT: Normocephalic, atraumatic, bilateral external ears normal, oropharynx 

moist, no oral exudates, nose normal. []


Eyes: PERRLA, EOMI, conjunctiva normal, no discharge. [] 


Neck: Normal range of motion, no tenderness, supple, no stridor. [] 


Pulmonary: Normal respiratory effort no increased work of breathing no obvious 

chest wall trauma


Abdomen: Bowel sounds normal, soft, no tenderness, no masses, no pulsatile 

masses. [] 


Skin: two lacerations one one cm with active bleeding and one two cm more 

distally no active bleeidng no fb noted.


Extremities: No tenderness, no cyanosis, no clubbing, ROM intact, no edema. []  

stump in place, bleeding as noted above


Neurologic: Alert and oriented X 3, normal motor function, normal sensory 

function, no focal deficits noted. []


Psychologic: Affect normal, judgement normal, mood normal. []





Current Patient Data


Vital Signs





                                   Vital Signs








  Date Time  Temp Pulse Resp B/P (MAP) Pulse Ox O2 Delivery O2 Flow Rate FiO2


 


7/9/19 01:41   18  98 Room Air  


 


7/9/19 00:36 97.8 78  161/77 (105)    





 97.8       











EKG


EKG


[]





Radiology/Procedures


Radiology/Procedures


[]





Course & Med Decision Making


Course & Med Decision Making


Pertinent Labs and Imaging studies reviewed. (See chart for details)





[]tetanus given


xray looks ok no fx seen





procedure note: wound cleansed no fb seen lido plain for anesthesia closed with 

4-0 nylon pt tolerated well excellent hemostasis obtained.


wrap applied


wound care precautions reviewed


return prec discussed





Dragon Disclaimer


Dragon Disclaimer


This electronic medical record was generated, in whole or in part, using a voice

 recognition dictation system.





Departure


Departure


Impression:  


   Primary Impression:  


   Laceration


Disposition:  01 HOME, SELF-CARE


Condition:  STABLE


Patient Instructions:  Laceration Care, Adult, Easy-to-Read





Additional Instructions:  


suture removal ten days











JAZZY HERMAN MD             Jul 9, 2019 02:31

## 2019-07-10 NOTE — HP
ADMIT DATE:  



CHIEF COMPLAINT:  Left leg pain.



HISTORY OF PRESENT ILLNESS:  A 55-year-old  male on dialysis an

insulin-dependent diabetic.  He has had left below-the-knee amputation and 3

days ago fell on the stump and suffered lacerations on both sides.  He has had

increasing pain since then and it appears he may have a fracture in the proximal

aspect of the fibula per the plain x-ray.  He came in because of increasing pain

and intolerant despite his usual dose of Percocet at home.



PAST MEDICAL HISTORY:  Well documented in all the old records.



ALLERGIES:  No allergies.



A1c is up-to-date.



SOCIAL HISTORY:  Nonsmoker, nondrinker, retired, disabled, .



FAMILY HISTORY:  Unremarkable.



REVIEW OF SYSTEMS:  No other complaints.



OBJECTIVE:

ENT:  All within normal limits.

NECK:  No masses, nodes or bruits.

LUNGS:  Clear.

CARDIOVASCULAR:  Regular rate.  No murmur.

ABDOMEN:  Benign and nontender.

EXTREMITIES:  The left below-the-knee stump has a large dressing in place

covering the wounds pulses.  Pulses in the right foot are adequate.  No other

acute findings.

NEUROLOGIC:  Physiologic and nonfocal.



ASSESSMENT:  Left below-the-knee stump injury with lacerations and probable

proximal fibular fracture with increasing pain despite chronic opioid therapy.



PLAN:  Increase his oxycodone by 50% to maintain pain control.  Lidoderm patch. 

Social work consult for wheelchair and will continue dialysis.  He is

recommended to have a stump  per Dr. Sparrow but is not able tolerate

that intervention at this time because of increasing pain.

 



______________________________

ARIC MATUTE MD



DR:  ASHLI/nts  JOB#:  [id number] / [id number]

DD:  07/10/2019 08:44  DT:  07/10/2019 09:00

## 2019-07-10 NOTE — PDOC2
CONSULT


Date of Consult


Date of Consult


DATE: 7/10/19 


TIME: 11:59





Reason for Consult


Reason for Consult:


ESRD





Source


Source:  Chart review, Patient





History of Present Illness


Reason for Visit:


 A 55-year-old  male on dialysis an insulin-dependent diabetic.  He has 

had left below-the-knee amputation and 3


days ago fell on the stump and suffered lacerations on both sides.  He has had 

increasing pain since then and it appears he may have a fracture in the proximal


aspect of the fibula per the plain x-ray.  He came in because of increasing pain

and intolerant despite his usual dose of Percocet at home.





Seen on HD, No complaints . Tolerating well





Past Medical History


Cardiovascular:  CAD, HTN, Hyperlipidemia, Other


Pulmonary:  COPD


GI:  Constipation


Heme/Onc:  Anemia NOS


Hepatobiliary:  No pertinent hx


Psych:  No pertinent hx


Rheumatologic:  No pertinent hx


Infectious disease:  No pertinent hx


Renal/:  Chronic renal insuff


Endocrine:  Diabetes, Hyperparathyroidism





Past Surgical History


Past Surgical History:  Other





Family History


Family History:  Diabetes, Hypertension





Social History


ALCOHOL:  none


Lives:  Alone





Current Problem List


Problem List


Problems


Medical Problems:


(1) Fracture, fibula, proximal


Status: Acute  





(2) Inadequate pain control


Status: Acute  











Current Medications


Current Medications





Current Medications


Oxycodone/ Acetaminophen (Percocet 10/325) 1 tab 1X  ONCE PO  Last administered 

on 7/9/19at 12:17;  Start 7/9/19 at 12:15;  Stop 7/9/19 at 12:16;  Status DC


Amlodipine Besylate (Norvasc) 10 mg DAILY PO  Last administered on 7/9/19at 

17:09;  Start 7/9/19 at 15:00


Aspirin (Children'S Aspirin) 81 mg DAILY PO ;  Start 7/9/19 at 15:00


Atorvastatin Calcium (Lipitor) 20 mg HS PO  Last administered on 7/9/19at 21:20;

 Start 7/9/19 at 21:00


Clopidogrel Bisulfate (Plavix) 75 mg DAILY PO  Last administered on 7/9/19at 

17:09;  Start 7/9/19 at 15:00


Cyclobenzaprine HCl (Flexeril) 10 mg TID PRN  PRN PO MUSCLE SPASMS Last 

administered on 7/10/19at 07:06;  Start 7/9/19 at 14:30


Furosemide (Lasix) 40 mg DAILY PO  Last administered on 7/9/19at 14:45;  Start 

7/9/19 at 15:00


Gabapentin (Neurontin) 300 mg DAILY PO  Last administered on 7/9/19at 14:46;  

Start 7/9/19 at 15:00


Losartan Potassium (Cozaar) 50 mg DAILY PO  Last administered on 7/9/19at 14:46;

 Start 7/9/19 at 15:00


Metoprolol Tartrate (Lopressor) 25 mg DAILY PO  Last administered on 7/9/19at 

17:09;  Start 7/9/19 at 15:00


Oxycodone/ Acetaminophen (Percocet 10/325) 1 tab PRN QID  PRN PO PAIN Last 

administered on 7/10/19at 04:17;  Start 7/9/19 at 14:30;  Stop 7/10/19 at 08:45;

 Status DC


Insulin Glargine (Lantus) 45 units QHS SQ  Last administered on 7/9/19at 21:29; 

Start 7/9/19 at 21:00


Insulin Glargine (Lantus) 55 units DAILY08 SQ ;  Start 7/10/19 at 08:00


Sevelamer Carbonate (Renvela) 800 mg TIDWMEALS PO  Last administered on 7/9/19at

18:32;  Start 7/9/19 at 18:30


Oxycodone HCl (Roxicodone) 5 mg PRN Q4HRS  PRN PO PAIN;  Start 7/10/19 at 08:45


Lidocaine (Lidoderm) 1 patch DAILY TD ;  Start 7/10/19 at 09:00


Miscellaneous (Lidoderm Patch Removal) 1 ea QHS MC ;  Start 7/10/19 at 21:00


Oxycodone/ Acetaminophen (Percocet 10/325) 1 tab PRN Q4HRS  PRN PO PAIN;  Start 

7/10/19 at 08:45


Lidocaine HCl (Xylocaine-Mpf 1% 2ml Vial) 2 ml STK-MED ONCE .ROUTE ;  Start 

7/10/19 at 09:15;  Stop 7/10/19 at 09:16;  Status DC


Info (PHARMACY MONITORING -- do not chart) 1 each PRN DAILY  PRN MC SEE 

COMMENTS;  Start 7/10/19 at 09:45;  Status UNV


Info (PHARMACY MONITORING -- do not chart) 1 each PRN DAILY  PRN MC SEE 

COMMENTS;  Start 7/10/19 at 09:45





Active Scripts


Active


Penicillin V Potassium 500 Mg Tablet 1 Tab PO TID


Gabapentin ** (Gabapentin) 300 Mg Capsule 300 Mg PO DAILY


Reported


Novolin N (Nph, Human Insulin Isophane) 100 Unit/1 Ml Vial 45 Unit SQ HS


Novolin N (Nph, Human Insulin Isophane) 100 Unit/1 Ml Vial 55 Unit SQ DAILY08


Losartan Potassium 50 Mg Tablet 50 Mg PO DAILY


Metoprolol Tartrate 25 Mg Tablet 25 Mg PO DAILY


Lipitor (Atorvastatin Calcium) 20 Mg Tablet 20 Mg PO HS


Amlodipine Besylate 10 Mg Tablet 10 Mg PO DAILY


Children's Aspirin (Aspirin) 81 Mg Tab.chew 81 Mg PO 


Cyclobenzaprine Hcl 10 Mg Tablet 1 Tab PO TID PRN PRN


Furosemide 40 Mg Tablet 1 Tab PO DAILY


Clopidogrel (Clopidogrel Bisulfate) 75 Mg Tablet 1 Tab PO DAILY


Percocet  Mg Tablet ** (Oxycodone/Acetaminophen) 1 Each Tablet 1 Tab PO 

PRN QID PRN


Ventolin Hfa Inhaler (Albuterol Sulfate) 18 Gm Hfa.aer.ad 2 Puff INH Q4HRS PRN





Allergies


Allergies:  


Coded Allergies:  


     No Known Drug Allergies (Unverified , 12/21/17)





ROS


Review of System


   Per HPI





Physical Exam


Physical Exam


GEN- NAD  


 HEEN-OM moist  


NECK:  Supple  


LUNGS:  Clear, Non labored  


CARDIOVASCULAR:  Regular rate.  No murmur.


ABDOMEN:  Soft, NT  


EXTREMITIES:  left below-the-knee stump has   dressing in place


  AV Fistula +


  - No Hernandez


NEURO- Grossly Normal


SKIN- No Rash .


Vital Signs





Vital Signs








  Date Time  Temp Pulse Resp B/P (MAP) Pulse Ox O2 Delivery O2 Flow Rate FiO2


 


7/10/19 07:00 98.0 85 18 116/56 (76) 93 Room Air  





 98.0       








Assessment & Plan


ESRD- On HD MWF 


Seen on HD , Tolerating well Continue as ordered


Haja Saxena  








 Left below-the-knee stump injury with lacerations and probable


proximal fibular fracture with increasing pain despite chronic opioid therapy.


Seen by Ortho  





DM- On insulin, Per primary  





HTN- On Antihypertensive, stable





Labs


Labs





Laboratory Tests








Test


 7/9/19


17:08 7/9/19


21:25 7/10/19


07:17


 


Glucose (Fingerstick)


 187 mg/dL


(70-99) 184 mg/dL


(70-99) 111 mg/dL


(70-99)








Laboratory Tests








Test


 7/9/19


17:08 7/9/19


21:25 7/10/19


07:17


 


Glucose (Fingerstick)


 187 mg/dL


(70-99) 184 mg/dL


(70-99) 111 mg/dL


(70-99)








Review


All relevant outside records, renal labs, imaging studies, telemetry/EKG's were 

reviewed.











LIN RUSSELL MD                Jul 10, 2019 11:59

## 2019-07-10 NOTE — NUR
SS following for discharge planning. SS reviewed pt chart. Pt is from home with spouse and 
is currently on room air. No discharge needs noted at this time. SS will continue to follow 
for discharge planning.

## 2019-07-11 NOTE — NUR
SS following up with referral regarding "needs a wheelchair." SS met with pt and RN in room 
to discuss. Pt and pt's RN reported that pt has a wheelchair. They reported that his spouse 
obtained one for him on 7/10/2019.

## 2019-07-11 NOTE — NUR
Spoke with Dr Goff re: BG, he stated to give 8 units insulin and send pt home.  Pt's BG 
came down to 215, d/c'd pt as ordered.

## 2019-07-11 NOTE — NUR
Discharge Note:



[facility]



Discharge instructions and discharge home medications reviewed with Patient and a copy 
given. All questions have been answered and understanding verbalized. 



The following instructions and handouts were given: follow up, medication, and education. 



Discontinued lines and drains: peripheral IV, tip intact.



Patient discharged to home with self care via private vehicle. 



Patient left unit in stable condition with all personal belongings.

## 2019-07-11 NOTE — DS
DATE OF DISCHARGE:  07/11/2019



HOSPITAL SUMMARY:  A 55-year-old  male had a fall a few days prior to

admission and lacerated the stump of his left leg and also had increasing pain

and was found to have evidence of a proximal fibular fracture.  He was seen by

Orthopedics and stump  was recommended and he was able to tolerate it. 

His pain is better controlled with increased dose of oxycodone and lidocaine

patches, and he has 2 wounds with sutures that are clean and he is able to be

discharged at this time.



FINAL DIAGNOSIS:  Intractable pain secondary to left proximal fibular fracture.



OPERATIONS, PROCEDURES, COMPLICATIONS:  None.



CONSULTATIONS:  Dr. Sparrow.



DISPOSITION:  Meds remain the same, continues on dialysis.  He will increase his

pain medicine by adding oxycodone 5 mg q.i.d. to the current Percocet 10 that he

takes q.i.d. now.  He will see Dr. Goff in 1 week for suture removal and be

on wheelchair and will not wear his prosthesis until he is more comfortable,

likely somewhere in 3-6 week range.



PROGNOSIS:  Guarded.

 



______________________________

ARIC GOFF MD



DR:  ASHLI/tanya  JOB#:  [id number] / [id number]

DD:  07/11/2019 08:43  DT:  07/11/2019 09:03

## 2019-07-15 NOTE — NUR
1705- patient arrived from ED. Morphine given for pain, patient is now resting in bed.

-------------------------------------------------------------------------------

Addendum: 07/15/19 at 2111 by KAYLA ATWOOD RN RN

-------------------------------------------------------------------------------

1905-patient arrived from ED. Morphine given for pain, patient is now resting in bed

## 2019-07-15 NOTE — PHYS DOC
Past Medical History


Past Medical History:  Diabetes-Type II, Hypertension


Additional Past Medical Histor:  MILD MI,ESRD,CHRONIC PAIN


Past Surgical History:  Other


Additional Past Surgical Histo:  LT ARM DIALYSIS SHUNT, CABG (TRIPLE BYPASS) 

2016 APPROX, L lower  amputaion


Alcohol Use:  None


Drug Use:  None





Adult General


Chief Complaint


Chief Complaint:  LOWER EXT PAIN





HPI


HPI





55-year-old male presents to ER via POV for complaints of ongoing left BKA pain.

Per patient he was discharged from the hospital on Friday after a fall causing 

him to have lacerations to the distal tip of his stump. Patient reports he 

called his primary care physician last night and had his pain medication 

increased however he has had minimal relief in his pain. Patient reports today 

at dialysis the nurse took the dressing off of his stump and felt the site 

possibly was becoming infected and had bleeding from the [place]. Patient 

reports over the weekend he has had decreased appetite and generalized fatigue. 

Patient denies fever. Patient reports he is on oxycodone 5 mg 4 times a day will

and Percocet 10 mg 4 times a day with last dose at 10 AM. Pt reports he did have

full dialysis today. 





Patient reports history of diabetes but has not checked his BS today.





Pt denies any falls or new injury since discharge on Friday.





Pt denies chest pain or shortness of air.





Review of Systems


Review of Systems





Constitutional: Denies fever or chills. Reports generalized fatigue


Eyes: Denies change in visual acuity, redness, or eye pain []


HENT: Denies nasal congestion or sore throat []


Respiratory: Denies cough or shortness of breath []


Cardiovascular: No additional information not addressed in HPI []


GI: Denies abdominal pain, nausea, vomiting, bloody stools or diarrhea. Reports 

decreased appetite


: Denies urinary sxs


Musculoskeletal: Denies back pain. Reports lt BKA stump pain and bleeding


Integument: Denies rash or skin lesions []


Neurologic: Denies headache, focal weakness or sensory changes []


Endocrine: Denies polyuria or polydipsia []





All other systems were reviewed and found to be within normal limits, except as 

documented in this note.





Current Medications


Current Medications





Current Medications








 Medications


  (Trade)  Dose


 Ordered  Sig/Yisel  Start Time


 Stop Time Status Last Admin


Dose Admin


 


 Morphine Sulfate


  (Morphine


 Sulfate)  4 mg  PRN Q2HR  PRN  7/15/19 18:30


 7/16/19 18:29 UNV  





 


 Ondansetron HCl


  (Zofran)  4 mg  PRN Q8HRS  PRN  7/15/19 18:30


 7/16/19 18:29   





 


 Sodium Chloride  500 ml @ 


 500 mls/hr  1X  ONCE  7/15/19 15:45


 7/15/19 16:44 DC 7/15/19 16:47


500 MLS/HR











Allergies


Allergies





Allergies








Coded Allergies Type Severity Reaction Last Updated Verified


 


  No Known Drug Allergies    12/21/17 No











Physical Exam


Physical Exam





Constitutional: Well developed, well nourished, no acute distress, non-toxic 

appearance. []


HENT: Normocephalic, atraumatic, mucous membranes pink/dry, nose normal. []


Eyes: Pupils equal, conjunctiva normal, no discharge. [] 


Neck: Normal range of motion, no tenderness, supple, no stridor. [] 


Cardiovascular: Heart rate regular rhythm, no murmur []


Lungs & Thorax:  Bilateral breath sounds clear to auscultation- resp. 

equal/nonlabored


Abdomen: Bowel sounds normal, soft, no tenderness, no masses, no pulsatile 

masses. [] 


Skin: Warm, dry, no erythema, no rash. [] 


Back: No tenderness, no CVA tenderness. [] 


Extremities: No cyanosis, no clubbing, ROM intact, no edema. Warmth/mild er

ythema at distal lt BKA stump- sutures in place at 2 lac sites with bleeding 

from sites. Femoral 2+ bilat. 


Neurologic: Alert and oriented X 3, normal motor function, normal sensory 

function, no focal deficits noted. []


Psychologic: Affect normal, judgement normal, mood normal. []





Current Patient Data


Vital Signs





                                   Vital Signs








  Date Time  Temp Pulse Resp B/P (MAP) Pulse Ox O2 Delivery O2 Flow Rate FiO2


 


7/15/19 15:38 98.8 101 18 139/67 (91) 98 Room Air  





 98.8       








Lab Values





                                Laboratory Tests








Test


 7/15/19


16:17


 


White Blood Count


 9.7 x10^3/uL


(4.0-11.0)


 


Red Blood Count


 3.49 x10^6/uL


(4.30-5.70)  L


 


Hemoglobin


 9.5 g/dL


(13.0-17.5)  L


 


Hematocrit


 28.8 %


(39.0-53.0)  L


 


Mean Corpuscular Volume


 82 fL ()





 


Mean Corpuscular Hemoglobin 27 pg (25-35)  


 


Mean Corpuscular Hemoglobin


Concent 33 g/dL


(31-37)


 


Red Cell Distribution Width


 16.2 %


(11.5-14.5)  H


 


Platelet Count


 335 x10^3/uL


(140-400)


 


Neutrophils (%) (Auto) 82 % (31-73)  H


 


Lymphocytes (%) (Auto) 10 % (24-48)  L


 


Monocytes (%) (Auto) 7 % (0-9)  


 


Eosinophils (%) (Auto) 1 % (0-3)  


 


Basophils (%) (Auto) 1 % (0-3)  


 


Neutrophils # (Auto)


 7.9 x10^3/uL


(1.8-7.7)  H


 


Lymphocytes # (Auto)


 1.0 x10^3/uL


(1.0-4.8)


 


Monocytes # (Auto)


 0.7 x10^3/uL


(0.0-1.1)


 


Eosinophils # (Auto)


 0.1 x10^3/uL


(0.0-0.7)


 


Basophils # (Auto)


 0.0 x10^3/uL


(0.0-0.2)


 


Sodium Level


 136 mmol/L


(136-145)


 


Potassium Level


 3.6 mmol/L


(3.5-5.1)


 


Chloride Level


 94 mmol/L


()  L


 


Carbon Dioxide Level


 30 mmol/L


(21-32)


 


Anion Gap 12 (6-14)  


 


Blood Urea Nitrogen


 30 mg/dL


(8-26)  H


 


Creatinine


 3.0 mg/dL


(0.7-1.3)  H


 


Estimated GFR


(Cockcroft-Gault) 21.8  





 


BUN/Creatinine Ratio 10 (6-20)  


 


Glucose Level


 103 mg/dL


(70-99)  H


 


Lactic Acid Level


 1.2 mmol/L


(0.4-2.0)


 


Calcium Level


 8.5 mg/dL


(8.5-10.1)


 


Total Bilirubin


 0.5 mg/dL


(0.2-1.0)


 


Aspartate Amino Transferase


(AST) 17 U/L (15-37)





 


Alanine Aminotransferase (ALT)


 24 U/L (16-63)





 


Alkaline Phosphatase


 90 U/L


()


 


Total Protein


 8.2 g/dL


(6.4-8.2)


 


Albumin


 2.9 g/dL


(3.4-5.0)  L


 


Albumin/Globulin Ratio


 0.5 (1.0-1.7)


L





                                Laboratory Tests


7/15/19 16:17








                                Laboratory Tests


7/15/19 16:17











EKG


EKG


[]





Radiology/Procedures


Radiology/Procedures


PROCEDURE: KNEE LEFT 3V





Indication:Left knee pain. Bleeding from stone.


 


TECHNIQUE: 3 views of the left knee


 


COMPARISON:None


 


FINDINGS/


impression:


Status post below-knee amputation. Nondisplaced fracture is seen of the 


fibular head with extension to the tibiofibular joint. No joint effusion. 


Soft tissue edema is seen in the stump. Vascular calcifications suggesting


peripheral arterial disease.


 


Electronically signed by: Amol Narayanan DO (7/15/2019 3:58 PM) [facility]














DICTATED and SIGNED BY:     AMOL NARAYANAN DO


DATE:     07/15/19 1558





PROCEDURE: VENOUS LOWER EXTREMITY LEFT





 


Examination:  Lower Extremity Venous Doppler Ultrasound


 


History: Left lower extremity redness, pain


 


Comparison: None


 


Procedure: Gray scale, color flow 2D and spectal waveform analysis images 


are obtained with and without compression in the area of the common 


femoral vein, superficial femoral vein - femoral vein junction, main 


femoral vein (superficial femoral vein) and popliteal vein. Veins of the 


proximal calf are also imaged.


 


Findings:


 


There is normal duplex flow, color flow and compressibility of all 


visualized vein segments. No evidence of deep venous thrombus is present.


 


Impression: 


 


No evidence of DVT in the visualized left lower extremity venous system.


 


Electronically signed by: Alfredito Jernigan MD (7/15/2019 4:40 PM) [facility]














DICTATED and SIGNED BY:     ALFREDITO JERNIGAN MD


DATE:     07/15/19 1640





Course & Med Decision Making


Course & Med Decision Making


Pertinent Labs and Imaging studies reviewed. (See chart for details)





1745: Test and imaging results were discussed with patient and he is reluctant 

on being discharged home due to intractable left stump pain. Spoke with Dr. Oliveros, on call for pt's PCP Dr. Matute and discussed pt's case- will admit to 

hospital for further care and monitoring. Patient has remained neurovascular 

intact in left lower extremity and his dressing to BKA stump wound sites has 

remained dry. Ultrasound left lower extremity was negative for DVT x-ray of left

 knee did report "Nondisplaced fracture is seen of the fibular head with 

extension to the tibiofibular joint. No joint effusion. Soft tissue edema is 

seen in the stump". Patient was provided with pain medication while in the ER. 

His WBCs and lactic acid both were normal limits. Will consult orthopedics with 

admit orders.





Dragon Disclaimer


Dragon Disclaimer


This electronic medical record was generated, in whole or in part, using a voice

 recognition dictation system.





Departure


Departure


Impression:  


   Primary Impression:  


   Intractable pain


   Additional Impression:  


   Leg pain, left


Disposition:  09 ADMITTED AS INPATIENT


Admitting Physician:  Aric Matute


Condition:  STABLE


Referrals:  


ARIC MATUTE MD (PCP)





Problem Qualifiers











TARI COPELAND          Jul 15, 2019 15:52

## 2019-07-15 NOTE — RAD
Examination:  Lower Extremity Venous Doppler Ultrasound

 

History: Left lower extremity redness, pain

 

Comparison: None

 

Procedure: Gray scale, color flow 2D and spectal waveform analysis images 

are obtained with and without compression in the area of the common 

femoral vein, superficial femoral vein - femoral vein junction, main 

femoral vein (superficial femoral vein) and popliteal vein. Veins of the 

proximal calf are also imaged.

 

Findings:

 

There is normal duplex flow, color flow and compressibility of all 

visualized vein segments. No evidence of deep venous thrombus is present.

 

Impression: 

 

No evidence of DVT in the visualized left lower extremity venous system.

 

Electronically signed by: Alfredito Jernigan MD (7/15/2019 4:40 PM) [facility]

## 2019-07-15 NOTE — RAD
Indication:Left knee pain. Bleeding from stone.

 

TECHNIQUE: 3 views of the left knee

 

COMPARISON:None

 

FINDINGS/

impression:

Status post below-knee amputation. Nondisplaced fracture is seen of the 

fibular head with extension to the tibiofibular joint. No joint effusion. 

Soft tissue edema is seen in the stump. Vascular calcifications suggesting

peripheral arterial disease.

 

Electronically signed by: Amol Narayanan DO (7/15/2019 3:58 PM) [facility]

## 2019-07-16 NOTE — NUR
Pharmacy Vancomycin Dosing Note



S:Consulted to monitor and dose vancomycin started 07/16/19.



O:ARABELLA WASHINGTON is a 55 year old M with 

Sepsis .



Height: 5 feet, 9 inches

Weight: 97.558199 kg

Ideal Body Weight: 70.70 

Adjusted Body Weight: 81.22 

Dosing Weight: Actual



Other Antibiotics: 

ZOSYN, ZYVOX PO



LABS:

Last BUN: 42 

Last Creatinine: 3.9 

Creatinine Clearance: 24 mL/min

Last WBC: 10.3 

Last Procalcitonin: 

Tmax (past 24 hours): 100.4 



Microbiology: 



I/O: 



Drug Levels:

Last  level:  on  at 

Last dose given 07/16/19 at 1200 



Vancomycin Dosing:

Loading Dose:  x1

Dosing Weight: Actual

Target Trough: 15-20





A: Based on: WEIGHT AND RENAL FUNCTION (DIALYSIS)





P: 1. Begin Vancomycin 2000 mg IV One Time

   2. Follow up Random level on 07/18/19 at 0600 

   3. Pharmacy will continue to monitor, follow and adjust therapy as needed.

 

 SUDHIR MURRELL [facility], 07/16/19 7229

## 2019-07-16 NOTE — NUR
SW following pt for anticipated dc needs. Chart reviewed and discussed with RN. Pt lives at 
home with spouse and ID following. No dc recommendation or SW needs noted at this time.

## 2019-07-16 NOTE — RAD
PQRS Compliance statement:

 

One or more of the following individualized dose reduction techniques were

utilized for this examination:

1. Automated exposure control.

2. Adjustment of the mA and/or kV according to patient size.

3. Use of iterative reconstruction technique.

 

INDICATION: Status post BKA with Stump infection. Chronic kidney disease.

 

TECHNIQUE: Noncontrast CT of the left knee with multiplanar reformats

 

COMPARISON: None

 

FINDINGS:

Comminuted fracture is seen of the fibular head with extension to the 

tibiofibular joint. There is no periosteal reaction or cortical erosion. 

No knee joint effusion. Small amount of emphysema is seen in the anterior 

aspect of the stump soft tissue. Diffuse soft tissue edema is seen at the 

knee and below-the-knee in the stomach. Trace amount of fluid is seen 

along the anterolateral aspect of the proximal tibia. Overlying skin 

thickening with ulceration noted anterior to the tibia. Significant 

atherosclerotic plaque seen in the visualized arteries. Mild Fatty atrophy

of the calf muscles. Mild tricompartmental osteoarthritis.

 

IMPRESSION:

1. Small amount of soft tissue emphysema, significant edema and trace 

amount of fluid along the anteromedial tibia highly concerning for 

necrotizing infection. There is no periosteal reaction or cortical erosion

to suggest imaging evidence of osteomyelitis is however the above 

mentioned soft tissue changes extends to the bony cortex of the tibia and 

hence osteomyelitis is not entirely ruled out. Consider further evaluation

with MRI.

2. Comminuted fracture of the fibular head.

 

Electronically signed by: Amol Narayanan DO (7/16/2019 1:56 PM) [facility]

## 2019-07-16 NOTE — PDOC2
CONSULT


Date of Consult


Date of Consult


DATE: 19 


TIME: 19:22





Reason for Consult


Reason for Consult:


Left leg pain and drainage possible infection





Identification/Chief Complaint


Chief Complaint


Left leg pain and drainage, possible infection





Source


Source:  Chart review, Patient





History of Present Illness


Reason for Visit:


This 55-year-old man with end-stage renal disease, diabetes and prior below-knee

amputation who injured his left leg below knee residual limb about 10 days ago. 

He had swelling and hematoma and a proximal fibula fracture, and the fracture 

was able to be treated nonoperatively. He had some sutures placed and a 

laceration at the distal portion of his amputation at that time. Since then he 

has had increasing swelling pain erythema drainage and low-grade fever. CT scan 

shows possible fluid collection or necrosis at the distal medial tibia residual 

limb. He reports increasing pain and drainage.





Past Medical History


Cardiovascular:  CAD, HTN, Hyperlipidemia, Other


Pulmonary:  COPD


GI:  Constipation


Heme/Onc:  Anemia NOS


Hepatobiliary:  No pertinent hx


Psych:  No pertinent hx


Rheumatologic:  No pertinent hx


Infectious disease:  No pertinent hx


Renal/:  Chronic renal insuff


Endocrine:  Diabetes, Hyperparathyroidism





Past Surgical History


Past Surgical History:  Other (left BKA )





Family History


Family History:  Diabetes, Hypertension





Social History


ALCOHOL:  none


Lives:  Alone





Current Problem List


Problem List


Problems


Medical Problems:


(1) Intractable pain


Status: Acute  





(2) Leg pain, left


Status: Acute  











Current Medications


Current Medications





Current Medications


Sodium Chloride 500 ml @  500 mls/hr 1X  ONCE IV  Last administered on 7/15/19at

16:47;  Start 7/15/19 at 15:45;  Stop 7/15/19 at 16:44;  Status DC


Morphine Sulfate (Morphine Sulfate) 4 mg 1X  ONCE IV  Last administered on 

7/15/19at 16:47;  Start 7/15/19 at 15:45;  Stop 7/15/19 at 15:46;  Status DC


Ondansetron HCl (Zofran) 4 mg PRN Q8HRS  PRN IV NAUSEA/VOMITING;  Start 7/15/19 

at 18:30;  Stop 19 at 18:29;  Status DC


Morphine Sulfate (Morphine Sulfate) 4 mg PRN Q2HR  PRN IV PAIN Last administered

on 19at 06:43;  Start 7/15/19 at 18:30;  Stop 19 at 08:14;  Status DC


Amlodipine Besylate (Norvasc) 10 mg DAILY PO  Last administered on  

08:19;  Start 19 at 09:00


Aspirin (Children'S Aspirin) 81 mg DAILY PO  Last administered on  

08:20;  Start 19 at 09:00


Atorvastatin Calcium (Lipitor) 20 mg HS PO  Last administered on 7/15/19at 

22:13;  Start 7/15/19 at 21:00


Clopidogrel Bisulfate (Plavix) 75 mg DAILY PO  Last administered on  

08:19;  Start 19 at 09:00


Cyclobenzaprine HCl (Flexeril) 10 mg TID PRN  PRN PO MUSCLE SPASMS Last 

administered on  17:13;  Start 7/15/19 at 20:45


Furosemide (Lasix) 40 mg DAILY PO  Last administered on  08:19;  Start 

19 at 09:00


Gabapentin (Neurontin) 300 mg DAILY PO  Last administered on  08:20;  

Start 19 at 09:00


Losartan Potassium (Cozaar) 50 mg DAILY PO  Last administered on  

08:19;  Start 19 at 09:00


Metoprolol Succinate (Toprol Xl) 25 mg DAILY PO  Last administered on  

08:20;  Start 19 at 09:00


Oxycodone/ Acetaminophen (Percocet 10/325) 1 tab PRN QID  PRN PO PAIN, 1st CH

OICE Last administered on 19at 00:17;  Start 7/15/19 at 20:45


Non-Formulary Medication (Albuterol Sulfate (Ventolin Hfa Inhaler)) 2 puff Q4HRS

 PRN INH SHORTNESS OF BREATH;  Start 7/15/19 at 20:45;  Status UNV


Insulin Glargine (Lantus) 45 units QHS SQ  Last administered on 7/15/19at 22:22;

 Start 7/15/19 at 21:00


Insulin Glargine (Lantus) 55 units DAILY08 SQ  Last administered on 19at 

08:24;  Start 19 at 08:00


Albuterol Sulfate (Ventolin Neb Soln) 2.5 mg PRN Q4HRS  PRN NEB SHORTNESS OF 

BREATH;  Start 7/15/19 at 21:15


Oxycodone HCl (Roxicodone) 5 mg PRN Q6HRS  PRN PO PAIN, 2nd CHOICE;  Start 

19 at 08:15


Morphine Sulfate (Morphine Sulfate) 4 mg PRN Q3HRS  PRN IV PAIN Last 

administered on 19at 17:13;  Start 19 at 08:15


Vancomycin HCl (Vanco Per Pharmacy) 1 each PRN DAILY  PRN MC SEE COMMENTS Last 

administered on 19at 11:34;  Start 19 at 11:15


Piperacillin Sod/ Tazobactam Sod 2.25 gm/Sodium Chloride 50 ml @  100 mls/hr 

Q8HRS IV  Last administered on 19at 14:00;  Start 19 at 14:00


Linezolid (Zyvox) 600 mg BID PO  Last administered on 19at 11:46;  Start 

19 at 11:30


Vancomycin HCl 2 gm/Sodium Chloride 500 ml @  250 mls/hr 1X  ONCE IV  Last 

administered on 19at 11:46;  Start 19 at 12:00;  Stop 19 at 

13:59;  Status DC


Vancomycin HCl (Vancomycin Random Level) 1 each 1X  ONCE MC ;  Start 19 at 

06:00;  Stop 19 at 06:01


Lactobacillus Rhamnosus (Culturelle) 1 cap BID PO ;  Start 19 at 21:00





Active Scripts


Active


Penicillin V Potassium 500 Mg Tablet 1 Tab PO TID


Gabapentin ** (Gabapentin) 300 Mg Capsule 300 Mg PO DAILY


Reported


Metoprolol Succinate ( Xl ) (Metoprolol Succinate) 25 Mg Tab.er.24h 25 Mg PO 

DAILY


Novolin N (Nph, Human Insulin Isophane) 100 Unit/1 Ml Vial 45 Unit SQ HS


Novolin N (Nph, Human Insulin Isophane) 100 Unit/1 Ml Vial 55 Unit SQ DAILY08


Losartan Potassium 50 Mg Tablet 50 Mg PO DAILY


Lipitor (Atorvastatin Calcium) 20 Mg Tablet 20 Mg PO HS


Amlodipine Besylate 10 Mg Tablet 10 Mg PO DAILY


Children's Aspirin (Aspirin) 81 Mg Tab.chew 81 Mg PO 


Cyclobenzaprine Hcl 10 Mg Tablet 1 Tab PO TID PRN PRN


Furosemide 40 Mg Tablet 1 Tab PO DAILY


Clopidogrel (Clopidogrel Bisulfate) 75 Mg Tablet 1 Tab PO DAILY


Percocet  Mg Tablet ** (Oxycodone/Acetaminophen) 1 Each Tablet 1 Tab PO 

PRN QID PRN


Ventolin Hfa Inhaler (Albuterol Sulfate) 18 Gm Hfa.aer.ad 2 Puff INH Q4HRS PRN





Allergies


Allergies:  


Coded Allergies:  


     No Known Drug Allergies (Unverified , 17)





Physical Exam


General:  Alert, Cooperative


HEENT:  Atraumatic


Lungs:  Normal air movement


Heart:  Regular rate


Abdomen:  Soft


Extremities:  Other (left BKA residual limb has tenderness swelling erythema and

drainage at the distal medial anterior area, just proximal to his amputation 

scar. There is tenderness, slight fluctuance, and possible abscess on ex

amination. There is warmth and erythema.)


Neuro:  Normal speech, Sensation intact


MUSCULOSKELETAL:  Abnormal exam of left (lower leg residual limb as above)





Vitals


VITALS





Vital Signs








  Date Time  Temp Pulse Resp B/P (MAP) Pulse Ox O2 Delivery O2 Flow Rate FiO2


 


19 15:00 98.4 84 18 145/72 (96) 98 Room Air  





 98.4       











Labs


Labs





Laboratory Tests








Test


 7/15/19


16:17 7/15/19


21:09 7/15/19


22:12 19


06:15


 


White Blood Count


 9.7 x10^3/uL


(4.0-11.0) 


 


 10.3 x10^3/uL


(4.0-11.0)


 


Red Blood Count


 3.49 x10^6/uL


(4.30-5.70) 


 


 3.31 x10^6/uL


(4.30-5.70)


 


Hemoglobin


 9.5 g/dL


(13.0-17.5) 


 


 9.1 g/dL


(13.0-17.5)


 


Hematocrit


 28.8 %


(39.0-53.0) 


 


 27.5 %


(39.0-53.0)


 


Mean Corpuscular Volume 82 fL ()    83 fL () 


 


Mean Corpuscular Hemoglobin 27 pg (25-35)    27 pg (25-35) 


 


Mean Corpuscular Hemoglobin


Concent 33 g/dL


(31-37) 


 


 33 g/dL


(31-37)


 


Red Cell Distribution Width


 16.2 %


(11.5-14.5) 


 


 16.5 %


(11.5-14.5)


 


Platelet Count


 335 x10^3/uL


(140-400) 


 


 349 x10^3/uL


(140-400)


 


Neutrophils (%) (Auto) 82 % (31-73)    72 % (31-73) 


 


Lymphocytes (%) (Auto) 10 % (24-48)    15 % (24-48) 


 


Monocytes (%) (Auto) 7 % (0-9)    10 % (0-9) 


 


Eosinophils (%) (Auto) 1 % (0-3)    3 % (0-3) 


 


Basophils (%) (Auto) 1 % (0-3)    1 % (0-3) 


 


Neutrophils # (Auto)


 7.9 x10^3/uL


(1.8-7.7) 


 


 7.4 x10^3/uL


(1.8-7.7)


 


Lymphocytes # (Auto)


 1.0 x10^3/uL


(1.0-4.8) 


 


 1.5 x10^3/uL


(1.0-4.8)


 


Monocytes # (Auto)


 0.7 x10^3/uL


(0.0-1.1) 


 


 1.0 x10^3/uL


(0.0-1.1)


 


Eosinophils # (Auto)


 0.1 x10^3/uL


(0.0-0.7) 


 


 0.3 x10^3/uL


(0.0-0.7)


 


Basophils # (Auto)


 0.0 x10^3/uL


(0.0-0.2) 


 


 0.1 x10^3/uL


(0.0-0.2)


 


Sodium Level


 136 mmol/L


(136-145) 


 


 134 mmol/L


(136-145)


 


Potassium Level


 3.6 mmol/L


(3.5-5.1) 


 


 3.9 mmol/L


(3.5-5.1)


 


Chloride Level


 94 mmol/L


() 


 


 94 mmol/L


()


 


Carbon Dioxide Level


 30 mmol/L


(21-32) 


 


 27 mmol/L


(21-32)


 


Anion Gap 12 (6-14)    13 (6-14) 


 


Blood Urea Nitrogen


 30 mg/dL


(8-26) 


 


 42 mg/dL


(8-26)


 


Creatinine


 3.0 mg/dL


(0.7-1.3) 


 


 3.9 mg/dL


(0.7-1.3)


 


Estimated GFR


(Cockcroft-Gault) 21.8 


 


 


 16.1 





 


BUN/Creatinine Ratio 10 (6-20)    11 (6-20) 


 


Glucose Level


 103 mg/dL


(70-99) 


 


 71 mg/dL


(70-99)


 


Lactic Acid Level


 1.2 mmol/L


(0.4-2.0) 


 


 





 


Calcium Level


 8.5 mg/dL


(8.5-10.1) 


 


 8.3 mg/dL


(8.5-10.1)


 


Total Bilirubin


 0.5 mg/dL


(0.2-1.0) 


 


 0.4 mg/dL


(0.2-1.0)


 


Aspartate Amino Transf


(AST/SGOT) 17 U/L (15-37) 


 


 


 24 U/L (15-37) 





 


Alanine Aminotransferase


(ALT/SGPT) 24 U/L (16-63) 


 


 


 23 U/L (16-63) 





 


Alkaline Phosphatase


 90 U/L


() 


 


 113 U/L


()


 


Total Protein


 8.2 g/dL


(6.4-8.2) 


 


 8.1 g/dL


(6.4-8.2)


 


Albumin


 2.9 g/dL


(3.4-5.0) 


 


 2.7 g/dL


(3.4-5.0)


 


Albumin/Globulin Ratio 0.5 (1.0-1.7)    0.5 (1.0-1.7) 


 


Glucose (Fingerstick)


 


 69 mg/dL


(70-99) 111 mg/dL


(70-99) 





 


Test


 19


07:21 19


11:24 19


16:40 





 


Glucose (Fingerstick)


 75 mg/dL


(70-99) 132 mg/dL


(70-99) 105 mg/dL


(70-99) 











Laboratory Tests








Test


 7/15/19


21:09 7/15/19


22:12 19


06:15 19


07:21


 


Glucose (Fingerstick)


 69 mg/dL


(70-99) 111 mg/dL


(70-99) 


 75 mg/dL


(70-99)


 


White Blood Count


 


 


 10.3 x10^3/uL


(4.0-11.0) 





 


Red Blood Count


 


 


 3.31 x10^6/uL


(4.30-5.70) 





 


Hemoglobin


 


 


 9.1 g/dL


(13.0-17.5) 





 


Hematocrit


 


 


 27.5 %


(39.0-53.0) 





 


Mean Corpuscular Volume   83 fL ()  


 


Mean Corpuscular Hemoglobin   27 pg (25-35)  


 


Mean Corpuscular Hemoglobin


Concent 


 


 33 g/dL


(31-37) 





 


Red Cell Distribution Width


 


 


 16.5 %


(11.5-14.5) 





 


Platelet Count


 


 


 349 x10^3/uL


(140-400) 





 


Neutrophils (%) (Auto)   72 % (31-73)  


 


Lymphocytes (%) (Auto)   15 % (24-48)  


 


Monocytes (%) (Auto)   10 % (0-9)  


 


Eosinophils (%) (Auto)   3 % (0-3)  


 


Basophils (%) (Auto)   1 % (0-3)  


 


Neutrophils # (Auto)


 


 


 7.4 x10^3/uL


(1.8-7.7) 





 


Lymphocytes # (Auto)


 


 


 1.5 x10^3/uL


(1.0-4.8) 





 


Monocytes # (Auto)


 


 


 1.0 x10^3/uL


(0.0-1.1) 





 


Eosinophils # (Auto)


 


 


 0.3 x10^3/uL


(0.0-0.7) 





 


Basophils # (Auto)


 


 


 0.1 x10^3/uL


(0.0-0.2) 





 


Sodium Level


 


 


 134 mmol/L


(136-145) 





 


Potassium Level


 


 


 3.9 mmol/L


(3.5-5.1) 





 


Chloride Level


 


 


 94 mmol/L


() 





 


Carbon Dioxide Level


 


 


 27 mmol/L


(21-32) 





 


Anion Gap   13 (6-14)  


 


Blood Urea Nitrogen


 


 


 42 mg/dL


(8-26) 





 


Creatinine


 


 


 3.9 mg/dL


(0.7-1.3) 





 


Estimated GFR


(Cockcroft-Gault) 


 


 16.1 


 





 


BUN/Creatinine Ratio   11 (6-20)  


 


Glucose Level


 


 


 71 mg/dL


(70-99) 





 


Calcium Level


 


 


 8.3 mg/dL


(8.5-10.1) 





 


Total Bilirubin


 


 


 0.4 mg/dL


(0.2-1.0) 





 


Aspartate Amino Transf


(AST/SGOT) 


 


 24 U/L (15-37) 


 





 


Alanine Aminotransferase


(ALT/SGPT) 


 


 23 U/L (16-63) 


 





 


Alkaline Phosphatase


 


 


 113 U/L


() 





 


Total Protein


 


 


 8.1 g/dL


(6.4-8.2) 





 


Albumin


 


 


 2.7 g/dL


(3.4-5.0) 





 


Albumin/Globulin Ratio   0.5 (1.0-1.7)  


 


Test


 19


11:24 19


16:40 


 





 


Glucose (Fingerstick)


 132 mg/dL


(70-99) 105 mg/dL


(70-99) 


 














Images


Images


Report reviewed, images independently reviewed. The area of possible infection 

or abscess is seen on series 5 image 23, at the distal medial tibia bone area of

the soft tissues.





[facility]  


                              [address] [place] [address]  


                                           [phone]  


 


                                           IMAGING REPORT  


 


                                               Signed  


 


PATIENT: ARABELLA WASHINGTON          ACCOUNT: [id number]            MRN#: [id number]

[id number]  


: 1964                  LOCATION: [address] [place] [address]                AGE: 55  


SEX: M                           EXAM DT: 19                ACCESSION#: 

[id number]  


STATUS: ADM IN                   ORD. PHYSICIAN: BRANDON MCLAUGHLIN MD     


REASON: stump infection.  CKD on HD  


PROCEDURE: CT LOWER EXTREMITY WO LEFT  


 


PQRS Compliance statement:  


 


 One or more of the following individualized dose reduction techniques were  


 utilized for this examination:  


 1. Automated exposure control.  


 2. Adjustment of the mA and/or kV according to patient size.  


 3. Use of iterative reconstruction technique.  


 


 INDICATION: Status post BKA with Stump infection. Chronic kidney disease.  


 


 TECHNIQUE: Noncontrast CT of the left knee with multiplanar reformats  


 


 COMPARISON: None  


 


 FINDINGS:  


 Comminuted fracture is seen of the fibular head with extension to the   


 tibiofibular joint. There is no periosteal reaction or cortical erosion.   


 No knee joint effusion. Small amount of emphysema is seen in the anterior   


 aspect of the stump soft tissue. Diffuse soft tissue edema is seen at the   


 knee and below-the-knee in the stomach. Trace amount of fluid is seen   


 along the anterolateral aspect of the proximal tibia. Overlying skin   


 thickening with ulceration noted anterior to the tibia. Significant   


 atherosclerotic plaque seen in the visualized arteries. Mild Fatty atrophy  


 of the calf muscles. Mild tricompartmental osteoarthritis.  


 


 IMPRESSION:  


 1. Small amount of soft tissue emphysema, significant edema and trace   


 amount of fluid along the anteromedial tibia highly concerning for   


 necrotizing infection. There is no periosteal reaction or cortical erosion  


 to suggest imaging evidence of osteomyelitis is however the above   


 mentioned soft tissue changes extends to the bony cortex of the tibia and   


 hence osteomyelitis is not entirely ruled out. Consider further evaluation  


 with MRI.  


 2. Comminuted fracture of the fibular head.  


 


 Electronically signed by: Amol Narayanan DO (2019 1:56 PM) [facility]  


 


 


 


 


DICTATED and SIGNED BY:     AMOL NARAYANAN DO  


DATE:     19 1356





Assessment/Plan


Assessment/Plan


Cellulitis left lower limb L03.116


Acute lymphangitis of left lower limb L03.126


Contusion of left lower leg, initial encounter S80.12XA





There appears to be a fluid collection and infection, probably hematoma or 

abscess with associated cellulitis and lymphangitis. I recommended incision and 

drainage. He has dialysis in the morning and we will plan surgery for incision 

and drainage about 1:30 in the afternoon. He agrees with this plan.











CINDY LAMAR MD                 2019 19:28

## 2019-07-16 NOTE — PDOC
Infectious Disease Note


Vital Sign


Vital Signs





Vital Signs








  Date Time  Temp Pulse Resp B/P (MAP) Pulse Ox O2 Delivery O2 Flow Rate FiO2


 


7/16/19 08:20    133/68    


 


7/16/19 07:00 100.4 97 18  92 Room Air  





 100.4       











Labs


Lab





Laboratory Tests








Test


 7/15/19


16:17 7/15/19


21:09 7/15/19


22:12 7/16/19


06:15


 


White Blood Count


 9.7 x10^3/uL


(4.0-11.0) 


 


 10.3 x10^3/uL


(4.0-11.0)


 


Red Blood Count


 3.49 x10^6/uL


(4.30-5.70) 


 


 3.31 x10^6/uL


(4.30-5.70)


 


Hemoglobin


 9.5 g/dL


(13.0-17.5) 


 


 9.1 g/dL


(13.0-17.5)


 


Hematocrit


 28.8 %


(39.0-53.0) 


 


 27.5 %


(39.0-53.0)


 


Mean Corpuscular Volume 82 fL ()    83 fL () 


 


Mean Corpuscular Hemoglobin 27 pg (25-35)    27 pg (25-35) 


 


Mean Corpuscular Hemoglobin


Concent 33 g/dL


(31-37) 


 


 33 g/dL


(31-37)


 


Red Cell Distribution Width


 16.2 %


(11.5-14.5) 


 


 16.5 %


(11.5-14.5)


 


Platelet Count


 335 x10^3/uL


(140-400) 


 


 349 x10^3/uL


(140-400)


 


Neutrophils (%) (Auto) 82 % (31-73)    72 % (31-73) 


 


Lymphocytes (%) (Auto) 10 % (24-48)    15 % (24-48) 


 


Monocytes (%) (Auto) 7 % (0-9)    10 % (0-9) 


 


Eosinophils (%) (Auto) 1 % (0-3)    3 % (0-3) 


 


Basophils (%) (Auto) 1 % (0-3)    1 % (0-3) 


 


Neutrophils # (Auto)


 7.9 x10^3/uL


(1.8-7.7) 


 


 7.4 x10^3/uL


(1.8-7.7)


 


Lymphocytes # (Auto)


 1.0 x10^3/uL


(1.0-4.8) 


 


 1.5 x10^3/uL


(1.0-4.8)


 


Monocytes # (Auto)


 0.7 x10^3/uL


(0.0-1.1) 


 


 1.0 x10^3/uL


(0.0-1.1)


 


Eosinophils # (Auto)


 0.1 x10^3/uL


(0.0-0.7) 


 


 0.3 x10^3/uL


(0.0-0.7)


 


Basophils # (Auto)


 0.0 x10^3/uL


(0.0-0.2) 


 


 0.1 x10^3/uL


(0.0-0.2)


 


Sodium Level


 136 mmol/L


(136-145) 


 


 134 mmol/L


(136-145)


 


Potassium Level


 3.6 mmol/L


(3.5-5.1) 


 


 3.9 mmol/L


(3.5-5.1)


 


Chloride Level


 94 mmol/L


() 


 


 94 mmol/L


()


 


Carbon Dioxide Level


 30 mmol/L


(21-32) 


 


 27 mmol/L


(21-32)


 


Anion Gap 12 (6-14)    13 (6-14) 


 


Blood Urea Nitrogen


 30 mg/dL


(8-26) 


 


 42 mg/dL


(8-26)


 


Creatinine


 3.0 mg/dL


(0.7-1.3) 


 


 3.9 mg/dL


(0.7-1.3)


 


Estimated GFR


(Cockcroft-Gault) 21.8 


 


 


 16.1 





 


BUN/Creatinine Ratio 10 (6-20)    11 (6-20) 


 


Glucose Level


 103 mg/dL


(70-99) 


 


 71 mg/dL


(70-99)


 


Lactic Acid Level


 1.2 mmol/L


(0.4-2.0) 


 


 





 


Calcium Level


 8.5 mg/dL


(8.5-10.1) 


 


 8.3 mg/dL


(8.5-10.1)


 


Total Bilirubin


 0.5 mg/dL


(0.2-1.0) 


 


 0.4 mg/dL


(0.2-1.0)


 


Aspartate Amino Transf


(AST/SGOT) 17 U/L (15-37) 


 


 


 24 U/L (15-37) 





 


Alanine Aminotransferase


(ALT/SGPT) 24 U/L (16-63) 


 


 


 23 U/L (16-63) 





 


Alkaline Phosphatase


 90 U/L


() 


 


 113 U/L


()


 


Total Protein


 8.2 g/dL


(6.4-8.2) 


 


 8.1 g/dL


(6.4-8.2)


 


Albumin


 2.9 g/dL


(3.4-5.0) 


 


 2.7 g/dL


(3.4-5.0)


 


Albumin/Globulin Ratio 0.5 (1.0-1.7)    0.5 (1.0-1.7) 


 


Glucose (Fingerstick)


 


 69 mg/dL


(70-99) 111 mg/dL


(70-99) 





 


Test


 7/16/19


07:21 


 


 





 


Glucose (Fingerstick)


 75 mg/dL


(70-99) 


 


 














Objective


Assessment


Fever


Left LE stump infection


S/p fall


CKD on HD


DM





Plan


Plan of Care


Begin Vanc/Zosyn and Zyvox


Consult Ortho


CT stump


cutl from wound


F/u labs and cults


May need I and D





D/w nursing





# [id number]











BRANDON MCLAUGHLIN MD              Jul 16, 2019 11:20

## 2019-07-16 NOTE — PDOC1
H & P


H&P


CHIEF COMPLAINT: Intractable left leg pain





HISTORY OF PRESENT ILLNESS:  A 55-year-old  male on dialysis an


insulin-dependent diabetic.  He has had left below-the-knee amputation and 1 

week ago


fell on the stump and suffered lacerations on both sides. He was admitted 7/9-

7/11 for 


pain control after finding a proximal fibular head fracture. His home 

medications were 


increased but he continues to have difficulty with pain control. Was seen at 

dialysis yesterday


and they encouraged him to come to ED out of concern for infection of wounds.





PAST MEDICAL HISTORY:  Well documented in all the old records.





ALLERGIES:  No allergies.





A1c is up-to-date.





SOCIAL HISTORY:  Nonsmoker, nondrinker, retired, disabled, .





FAMILY HISTORY:  Unremarkable.





REVIEW OF SYSTEMS:  No other complaints.





LABS/IMAGING: Reviewed.





OBJECTIVE:


GENERAL: Alert, oriented


LUNGS:  Clear.


CARDIOVASCULAR:  Regular rate. 2/6 systolic murmur.


ABDOMEN:  Benign and nontender, no masses, nondistended


EXTREMITIES:  The left below-the-knee stump has a large dressing in place


covering the wounds. Wounds appear to be healing, no obvious erythema, 


but stump is quite warm in comparison to the R. Significant tenderness to 


minimal palpation throughout the stump, but especially on the medial aspect 


(opposite of the known fracture). No appreciable wound drainage.


NEUROLOGIC:  Physiologic and nonfocal.





ASSESSMENT:  


Left below-the-knee stump injury with lacerations and proximal nondisplaced 

fibular fracture with extension to the tibiofibular joint


Possible cellulitis, or osteomyelitis? concerning given severe tenderness to min

imal palpation 


Intractable left leg pain


Opioid dependence


ESRD on dialysis


HTN


DM2


PAD s/p L BKA








Continue home medications


Pain control


Ortho consulted - saw pt last visit and no surgical intervention needed, just 

stump 


ID consult to eval for possible infectious component - labs are not significant 

for signs of infection at this time, blood cx pending.











MARY CALDERA MD            Jul 16, 2019 08:08

## 2019-07-16 NOTE — CONS
DATE OF CONSULTATION:  07/16/2019



LOCATION:  The patient's room is 574.



REQUESTING PHYSICIAN:  Dr. Oliveros.



REASON FOR CONSULTATION:  Cellulitis versus osteomyelitis of the stump.



HISTORY OF PRESENT ILLNESS:  The patient is a pleasant 55-year-old gentleman

with history of diabetes, chronic kidney disease, on hemodialysis and underwent

a left BKA back in 2012.  Approximately the ninth or so of July of this year, he

states he is getting ready to go to bed and had loosened his prosthesis.  He

states he forgot something in the kitchen and turned, forgetting that he had

loosened his prosthesis and he fell forward and landed straight down on the end

of his stump.  He was admitted to [facility] last week, was

evaluated by Orthopedics and it was felt that there was no need for any acute

procedures.  He did have some sutures that were placed.  He subsequently was

discharged; however, he went to dialysis yesterday, was noted to have a

low-grade fever.  He was told that he had fever in his left stump and was

recommended to come back to the Emergency Room as he was having excessive pain. 

He had some chills, some fatigue.  No gross headaches, sinus issues, sore

throat, cough or chest pain.  A little bit of nausea, but no vomiting.  No

cramps or diarrhea.  He does say that he was aware that he had mild fracture

there previously.  He underwent an ultrasound that did not show any DVT.  Also

underwent an x-ray again showed a nondisplaced fracture on the fibular head with

extension to the tibiofibular joint.  No effusion.  This morning, he had a

temperature of 100.4.



PAST MEDICAL HISTORY:  Positive for the above-mentioned diabetes; history of

chronic kidney disease, on hemodialysis; history of group G strep.  History of

previous thigh wound, coronary artery disease, hypertension, acute coronary

syndrome.  COPD and hyperparathyroidism, mild obesity.



PAST SURGICAL HISTORY:  Positive for left AV fistula, left below-the-knee

amputation, history of right-sided tunneled dialysis catheter placements.



REVIEW OF SYSTEMS:  Otherwise negative except as mentioned above.



ALLERGIES:  No known drug allergies.



SOCIAL HISTORY:  He is .  No tobacco or alcohol.



FAMILY HISTORY:  Noncontributory.



CURRENT MEDICATIONS:  Include albuterol, Norvasc, Children's Aspirin, Lipitor,

Plavix, Flexeril, Lasix, Neurontin, Lantus, Cozaar, Toprol-XL.  Other meds are

available and reviewed in the chart.



PHYSICAL EXAMINATION:

VITAL SIGNS:  Temperature 100.4, pulse 97, respirations 18, blood pressure

133/60, satting 92% on room air.

CONSTITUTIONAL:  He is cooperative.  He is in no acute distress.

HEENT:  Pupils are equal and reactive with normal conjunctivae.  Oral cavity and

pharynx, has poor dentition.

NECK:  Supple.  Good range of motion.

LUNGS:  Clear to auscultation bilaterally.

HEART:  S1, S2.

ABDOMEN:  Obese, soft, nontender, no guarding or no rebound.

EXTREMITIES:  Without clubbing, cyanosis.  His left knee has sutures in place. 

There is minimal erythema and it is very tender to touch and it is slight warm.

SKIN:  Otherwise, warm to touch without signs of rash.  AV fistula is without

signs of any complications.

NEUROLOGIC:  He is nonfocal.  Affect is appropriate.



LABORATORY DATA:  White count 10.3, hemoglobin 9.1, platelets 349, neutrophils

72%, lymphs 15, glucose was 75.  Normal liver function study tests.



Radiology reviewed in the history of present illness.



IMPRESSION:

1.  Fever.

2.  Left lower extremity stump infection.

3.  Status post fall.

4.  Chronic kidney disease, on hemodialysis.

5.  Diabetes.



RECOMMENDATIONS:  Begin vancomycin, Zosyn and Zyvox.  Zyvox will be used for

toxin production shutdown.  Consult Orthopedics.  CT of stump.  Obtain culture

from the wound.  Follow up labs and cultures.  May need I and D.  Discussed with

nursing.



Thank you for allowing me to participate in the patient's care.  Should you have

any further questions, please do not hesitate to contact me.

 



______________________________

BRANDON MCLAUGHLIN MD



DR:  HARRIET/tanya  JOB#:  [id number] / [id number]

DD:  07/16/2019 11:19  DT:  07/16/2019 13:58

## 2019-07-17 NOTE — PDOC4
Operative Note


Operative Note


Date of Procedure:   July 17, 2019


Pre-Op Diagnosis:   


* L03.116 cellulitis of left lower limb


* L02.436 Carbuncle of left lower limb 


Post-Op Diagnosis:   same


Procedure:      CPT 51149 incision and drainage left leg abscess


Surgeon:       Cindy Riley MD


Anesthesia:        General


EBL:       25 mL


Specimens Obtained:     Swab cultures for aerobic and anaerobic


Complications:        none


Drains:       none





Findings: Abscess approximately 10 cubic centimeters of the lower leg with 

purulent drainage at the skin level. The abscess is contacting the tibia bone of

the residual limb, with some periosteal loss but no apparent bone destruction. I

suspect the original injury injured the periosteum or subcutaneous tissues 

causing first a deep hematoma and later bacterial invasion and abscess.





Indications for Procedure: The patient is a 55-year-old man with prior below-

knee amputation due to competitions of diabetes and peripheral vascular disease.

 He fell about a week ago and had a laceration at the residual limb treated with

sutures and routine care. Recently he developed increasing pain and swelling, 

fever, and evidence of infection. CT scan shows fluid collection or possible 

air, and probable infection. His clinical exam is consistent with an abscess at 

the residual limb, deep to the skin and subcutaneous tissues, and probably 

contacting the tibial residual limb bone. The patient and I discussed the risks,

benefits and alternatives of surgery. I recommended incision and drainage and he

agreed.





Procedure in Detail: The patient was identified in the preoperative holding 

area.  The correct left lower extremity was marked by me.  The patient was taken

to the operating room where general anesthesia was used.  The patient was 

positioned supine on the operating table. He remains on scheduled antibiotics so

no further doses were used. A timeout procedure was performed. A tourniquet was 

applied to the upper limb.  The limb was prepared in sterile fashion with 

surgical prep solution. Sterile drapes were applied.  





The limb was elevated to exsanguinate it and the tourniquet was inflated to 350 

mmHg. Purulent drainage is noted at the area of the prior sutures and the prior 

sutures were removed. I made a skin incision a proximally 6 cm in length. There 

was purulent fluid which was cultured. Digital exploration shows that this 

involves the residual limb tibial bone which is only about 1 cm below the level 

of the skin in his below-knee amputation. There was some damage to the 

periosteum and I did sharp excisional debridement of some of the involved 

periosteum and subcutaneous tissues. Frankiegeurs were used for debridement. 

Betadine lavage was also performed. I then used the Ploonge interpulse 

and used 3 L of saline. The tourniquet was released. Bovie electrocautery was 

used for hemostasis. The 6 cm incision was loosely reapproximated proximally and

distally with 2-0 nylon horizontal mattress suture. The center portion of the 

original laceration was left open, and the abscess cavity was packed with about 

30 inches of  inch iodoform packing.





Xeroform and a sterile gauze dressing was applied. The patient alert of the 

procedure well. Needle and sponge counts were correct. There were no apparent 

complications.





I recommend removing the packing about 6 inches daily until it is all removed. 

Do not repack.  I dont think he has ashley osteomyelitis, however the abscess 

definitely contacts the bone, and with his high risk comorbidities, he might 

require a few weeks of intravenous antibiotics.











CINDY RILEY MD                 Jul 17, 2019 16:49

## 2019-07-17 NOTE — PDOC
SUBJECTIVE


Subjective


Temp of 100.4 yesterday AM, otherwise afebrile. Pain is not adequately con

trolled with current medications.





OBJECTIVE


Objective


Reviewed.


Vital Signs





Vital Signs








  Date Time  Temp Pulse Resp B/P (MAP) Pulse Ox O2 Delivery O2 Flow Rate FiO2


 


7/17/19 07:00 98.3 82 16 138/75 (96) 95 Room Air  





 98.3       


 


7/17/19 06:58   18   Room Air  


 


7/17/19 03:00 98.0 99 18 148/79 (102) 99 Room Air  





 98.0       


 


7/16/19 23:24   20   Room Air  


 


7/16/19 23:00 98.1 100 18 139/83 (101) 96 Room Air  





 98.1       


 


7/16/19 22:54   20   Room Air  


 


7/16/19 20:46   18   Room Air  


 


7/16/19 20:00      Room Air  


 


7/16/19 19:46   18   Room Air  


 


7/16/19 19:00 98.1 84 18 136/69 (91) 96 Room Air  





 98.1       


 


7/16/19 15:00 98.4 84 18 145/72 (96) 98 Room Air  





 98.4       


 


7/16/19 11:00 98.8 82 18 139/69 (92) 97 Room Air  





 98.8       


 


7/16/19 08:20    133/68    








I & O











Intake and Output 


 


 7/17/19





 07:00


 


Intake Total 650 ml


 


Output Total 325 ml


 


Balance 325 ml


 


 


 


Intake Oral 600 ml


 


IV Total 50 ml


 


Output Urine Total 325 ml











PHYSICAL EXAM


Physical Exam


GENERAL: Alert, oriented


LUNGS:  Clear.


CARDIOVASCULAR:  Regular rate. 2/6 systolic murmur.


ABDOMEN:  Benign and nontender, no masses, nondistended


EXTREMITIES:  The left below-the-knee stump has a large dressing in place


covering the wounds. Wounds appear to be healing, no obvious erythema, 


but stump is quite warm in comparison to the R. Significant tenderness to 


minimal palpation throughout the stump, but especially on the medial aspect 


(opposite of the known fracture). Small amount of purulent wound drainage.


NEUROLOGIC:  Physiologic and nonfocal.





ASSESSMENT/PLAN


Assessment/Plan


Left below-the-knee stump injury with lacerations and proximal nondisplaced 

fibular fracture with extension to the tibiofibular joint


Likely necrotizing infection of stump per CT findings, ?osteomyelitis 


Intractable left leg pain


Opioid dependence


ESRD on dialysis


HTN


DM2


PAD s/p L BKA





Plan for I&D today after dialysis


Abx per ID


Pain control





COMMENT


Lab





Laboratory Tests








Test


 7/16/19


11:24 7/16/19


16:40 7/16/19


19:53 7/17/19


07:25


 


Glucose (Fingerstick)


 132 mg/dL


(70-99) 105 mg/dL


(70-99) 206 mg/dL


(70-99) 83 mg/dL


(70-99)

















MARY CALDERA MD            Jul 17, 2019 08:38

## 2019-07-17 NOTE — PDOC2
CONSULT


Date of Consult


Date of Consult


DATE: 7/17/19 


TIME: 10:57





Reason for Consult


Reason for Consult:


ESRD





Identification/Chief Complaint


Chief Complaint


"I knew I have infection in the stump"





History of Present Illness


Reason for Visit:


Pt is a  55-year-old male with ESRD on HD MWF,diabetes, a left BKA back in 2012.

 Last week he  fell forward and landed on the end


of his stump and was admitted to  last week, was 

evaluated by Orthopedics , no  acute procedures recommended  


 He was subsequently  discharged; He says he doesn't have any issues with HD





Past Medical History


Cardiovascular:  CAD, HTN, Hyperlipidemia, Other


Pulmonary:  COPD


GI:  Constipation


Heme/Onc:  Anemia NOS


Hepatobiliary:  No pertinent hx


Psych:  No pertinent hx


Rheumatologic:  No pertinent hx


Infectious disease:  No pertinent hx


Renal/:  Chronic renal insuff


Endocrine:  Diabetes, Hyperparathyroidism





Past Surgical History


Past Surgical History:  Other (left BKA 2012)





Family History


Family History:  Diabetes, Hypertension





Social History


ALCOHOL:  none


Lives:  Alone





Current Problem List


Problem List


Problems


Medical Problems:


(1) Intractable pain


Status: Acute  





(2) Leg pain, left


Status: Acute  











Current Medications


Current Medications





Current Medications


Sodium Chloride 500 ml @  500 mls/hr 1X  ONCE IV  Last administered on 7/15/19at

16:47;  Start 7/15/19 at 15:45;  Stop 7/15/19 at 16:44;  Status DC


Morphine Sulfate (Morphine Sulfate) 4 mg 1X  ONCE IV  Last administered on 

7/15/19at 16:47;  Start 7/15/19 at 15:45;  Stop 7/15/19 at 15:46;  Status DC


Ondansetron HCl (Zofran) 4 mg PRN Q8HRS  PRN IV NAUSEA/VOMITING;  Start 7/15/19 

at 18:30;  Stop 7/16/19 at 18:29;  Status DC


Morphine Sulfate (Morphine Sulfate) 4 mg PRN Q2HR  PRN IV PAIN Last administered

on 7/16/19at 06:43;  Start 7/15/19 at 18:30;  Stop 7/16/19 at 08:14;  Status DC


Amlodipine Besylate (Norvasc) 10 mg DAILY PO  Last administered on 7/16/19at 

08:19;  Start 7/16/19 at 09:00


Aspirin (Children'S Aspirin) 81 mg DAILY PO  Last administered on 7/16/19at 

08:20;  Start 7/16/19 at 09:00


Atorvastatin Calcium (Lipitor) 20 mg HS PO  Last administered on 7/16/19at 

21:03;  Start 7/15/19 at 21:00


Clopidogrel Bisulfate (Plavix) 75 mg DAILY PO  Last administered on 7/16/19at 08

:19;  Start 7/16/19 at 09:00


Cyclobenzaprine HCl (Flexeril) 10 mg TID PRN  PRN PO MUSCLE SPASMS Last 

administered on 7/16/19at 17:13;  Start 7/15/19 at 20:45


Furosemide (Lasix) 40 mg DAILY PO  Last administered on 7/16/19at 08:19;  Start 

7/16/19 at 09:00


Gabapentin (Neurontin) 300 mg DAILY PO  Last administered on 7/16/19at 08:20;  

Start 7/16/19 at 09:00


Losartan Potassium (Cozaar) 50 mg DAILY PO  Last administered on 7/16/19at 

08:19;  Start 7/16/19 at 09:00


Metoprolol Succinate (Toprol Xl) 25 mg DAILY PO  Last administered on 7/16/19at 

08:20;  Start 7/16/19 at 09:00


Oxycodone/ Acetaminophen (Percocet 10/325) 1 tab PRN QID  PRN PO PAIN, 1st 

CHOICE Last administered on 7/16/19at 00:17;  Start 7/15/19 at 20:45;  Stop 

7/17/19 at 08:41;  Status DC


Non-Formulary Medication (Albuterol Sulfate (Ventolin Hfa Inhaler)) 2 puff Q4HRS

 PRN INH SHORTNESS OF BREATH;  Start 7/15/19 at 20:45;  Status UNV


Insulin Glargine (Lantus) 45 units QHS SQ  Last administered on 7/16/19at 21:08;

 Start 7/15/19 at 21:00


Insulin Glargine (Lantus) 55 units DAILY08 SQ  Last administered on 7/16/19at 

08:24;  Start 7/16/19 at 08:00


Albuterol Sulfate (Ventolin Neb Soln) 2.5 mg PRN Q4HRS  PRN NEB SHORTNESS OF 

BREATH;  Start 7/15/19 at 21:15


Oxycodone HCl (Roxicodone) 5 mg PRN Q6HRS  PRN PO PAIN, 2nd CHOICE Last 

administered on 7/16/19at 19:46;  Start 7/16/19 at 08:15;  Stop 7/17/19 at 

08:41;  Status DC


Morphine Sulfate (Morphine Sulfate) 4 mg PRN Q3HRS  PRN IV PAIN Last 

administered on 7/17/19at 06:58;  Start 7/16/19 at 08:15;  Stop 7/17/19 at 08

:41;  Status DC


Vancomycin HCl (Vanco Per Pharmacy) 1 each PRN DAILY  PRN MC SEE COMMENTS Last 

administered on 7/17/19at 08:48;  Start 7/16/19 at 11:15


Piperacillin Sod/ Tazobactam Sod 2.25 gm/Sodium Chloride 50 ml @  100 mls/hr 

Q8HRS IV  Last administered on 7/17/19at 06:41;  Start 7/16/19 at 14:00


Linezolid (Zyvox) 600 mg BID PO  Last administered on 7/16/19at 21:03;  Start 

7/16/19 at 11:30


Vancomycin HCl 2 gm/Sodium Chloride 500 ml @  250 mls/hr 1X  ONCE IV  Last 

administered on 7/16/19at 11:46;  Start 7/16/19 at 12:00;  Stop 7/16/19 at 

13:59;  Status DC


Vancomycin HCl (Vancomycin Random Level) 1 each 1X  ONCE MC ;  Start 7/18/19 at 

06:00;  Stop 7/18/19 at 06:01


Lactobacillus Rhamnosus (Culturelle) 1 cap BID PO  Last administered on 

7/16/19at 21:03;  Start 7/16/19 at 21:00


Ondansetron HCl (Zofran) 4 mg PRN Q6HRS  PRN IV NAUSEA/VOMITING;  Start 7/17/19 

at 07:30;  Stop 7/18/19 at 07:29


Fentanyl Citrate (Fentanyl 2ml Vial) 25 mcg PRN Q5MIN  PRN IV MILD PAIN 1-3;  

Start 7/17/19 at 07:30;  Stop 7/18/19 at 07:29


Fentanyl Citrate (Fentanyl 2ml Vial) 50 mcg PRN Q5MIN  PRN IV MODERATE TO SEVERE

PAIN;  Start 7/17/19 at 07:30;  Stop 7/18/19 at 07:29


Morphine Sulfate (Morphine Sulfate) 1 mg PRN Q10MIN  PRN IV SEVERE PAIN 7-10;  

Start 7/17/19 at 07:30;  Stop 7/18/19 at 07:29


Ringer's Solution 1,000 ml @  30 mls/hr Q24H IV ;  Start 7/17/19 at 07:24;  Stop

7/17/19 at 19:23


Hydromorphone HCl (Dilaudid) 0.5 mg PRN Q10MIN  PRN IV SEV PAIN, Second choice; 

Start 7/17/19 at 07:30;  Stop 7/18/19 at 07:29


Prochlorperazine Edisylate (Compazine) 5 mg PACU PRN  PRN IV NAUSEA, MRX1;  

Start 7/17/19 at 07:30;  Stop 7/18/19 at 07:29


Sodium Chloride 1,000 ml @  1,000 mls/hr Q1H PRN IV hypotension;  Start 7/17/19 

at 07:31;  Stop 7/17/19 at 13:30


Diphenhydramine HCl (Benadryl) 25 mg 1X PRN  PRN IV ITCHING;  Start 7/17/19 at 

07:45;  Stop 7/18/19 at 07:44


Diphenhydramine HCl (Benadryl) 25 mg 1X PRN  PRN IV ITCHING;  Start 7/17/19 at 

07:45;  Stop 7/18/19 at 07:44


Sodium Chloride 1,000 ml @  400 mls/hr Q2H30M PRN IV PATENCY;  Start 7/17/19 at 

07:31;  Stop 7/17/19 at 19:30


Info (PHARMACY MONITORING -- do not chart) 1 each PRN DAILY  PRN MC SEE 

COMMENTS;  Start 7/17/19 at 07:45


Lidocaine HCl (Xylocaine-Mpf 1% 2ml Vial) 2 ml STK-MED ONCE .ROUTE ;  Start 

7/17/19 at 08:01;  Stop 7/17/19 at 08:02;  Status DC


Oxycodone/ Acetaminophen (Percocet 10/325) 1 tab QID PO ;  Start 7/17/19 at 

09:00


Hydromorphone HCl (Dilaudid) 1 mg PRN Q3HRS  PRN IV PAIN Last administered on 

7/17/19at 10:01;  Start 7/17/19 at 08:45





Active Scripts


Active


Penicillin V Potassium 500 Mg Tablet 1 Tab PO TID


Gabapentin ** (Gabapentin) 300 Mg Capsule 300 Mg PO DAILY


Reported


Metoprolol Succinate ( Xl ) (Metoprolol Succinate) 25 Mg Tab.er.24h 25 Mg PO 

DAILY


Novolin N (Nph, Human Insulin Isophane) 100 Unit/1 Ml Vial 45 Unit SQ HS


Novolin N (Nph, Human Insulin Isophane) 100 Unit/1 Ml Vial 55 Unit SQ DAILY08


Losartan Potassium 50 Mg Tablet 50 Mg PO DAILY


Lipitor (Atorvastatin Calcium) 20 Mg Tablet 20 Mg PO HS


Amlodipine Besylate 10 Mg Tablet 10 Mg PO DAILY


Children's Aspirin (Aspirin) 81 Mg Tab.chew 81 Mg PO 


Cyclobenzaprine Hcl 10 Mg Tablet 1 Tab PO TID PRN PRN


Furosemide 40 Mg Tablet 1 Tab PO DAILY


Clopidogrel (Clopidogrel Bisulfate) 75 Mg Tablet 1 Tab PO DAILY


Percocet  Mg Tablet ** (Oxycodone/Acetaminophen) 1 Each Tablet 1 Tab PO 

PRN QID PRN


Ventolin Hfa Inhaler (Albuterol Sulfate) 18 Gm Hfa.aer.ad 2 Puff INH Q4HRS PRN





Allergies


Allergies:  


Coded Allergies:  


     No Known Drug Allergies (Unverified , 12/21/17)





ROS


Review of System


   Per HPI





Physical Exam


Physical Exam


GEN: NAD


HEENT:OM moist 


NECK:  Supple.  


LUNGS:  Clear to auscultation bilaterally.


HEART:  S1, S2.


ABDOMEN:  Obese, soft, nontender, 


EXTREMITIES:  Lt BKA, No edema , AVFLt arm , Good thrill and Bruit


SKIN:  No Rash  


NEUROLOGIC: Grossly Normal


 No Hernandez


Vital Signs





Vital Signs








  Date Time  Temp Pulse Resp B/P (MAP) Pulse Ox O2 Delivery O2 Flow Rate FiO2


 


7/17/19 10:01   18  95 Room Air  


 


7/17/19 07:00 98.3 82  138/75 (96)    





 98.3       








Assessment & Plan


ESRD- on HD MWF


Seen on HD , tolerating well , continue as ordered, [facility] 





 Fever- On Abx 





  Left lower extremity stump infection.


  Status post fall.





  Diabetes.





Labs


Labs





Laboratory Tests








Test


 7/15/19


16:17 7/15/19


21:09 7/15/19


22:12 7/16/19


06:15


 


White Blood Count


 9.7 x10^3/uL


(4.0-11.0) 


 


 10.3 x10^3/uL


(4.0-11.0)


 


Red Blood Count


 3.49 x10^6/uL


(4.30-5.70) 


 


 3.31 x10^6/uL


(4.30-5.70)


 


Hemoglobin


 9.5 g/dL


(13.0-17.5) 


 


 9.1 g/dL


(13.0-17.5)


 


Hematocrit


 28.8 %


(39.0-53.0) 


 


 27.5 %


(39.0-53.0)


 


Mean Corpuscular Volume 82 fL ()    83 fL () 


 


Mean Corpuscular Hemoglobin 27 pg (25-35)    27 pg (25-35) 


 


Mean Corpuscular Hemoglobin


Concent 33 g/dL


(31-37) 


 


 33 g/dL


(31-37)


 


Red Cell Distribution Width


 16.2 %


(11.5-14.5) 


 


 16.5 %


(11.5-14.5)


 


Platelet Count


 335 x10^3/uL


(140-400) 


 


 349 x10^3/uL


(140-400)


 


Neutrophils (%) (Auto) 82 % (31-73)    72 % (31-73) 


 


Lymphocytes (%) (Auto) 10 % (24-48)    15 % (24-48) 


 


Monocytes (%) (Auto) 7 % (0-9)    10 % (0-9) 


 


Eosinophils (%) (Auto) 1 % (0-3)    3 % (0-3) 


 


Basophils (%) (Auto) 1 % (0-3)    1 % (0-3) 


 


Neutrophils # (Auto)


 7.9 x10^3/uL


(1.8-7.7) 


 


 7.4 x10^3/uL


(1.8-7.7)


 


Lymphocytes # (Auto)


 1.0 x10^3/uL


(1.0-4.8) 


 


 1.5 x10^3/uL


(1.0-4.8)


 


Monocytes # (Auto)


 0.7 x10^3/uL


(0.0-1.1) 


 


 1.0 x10^3/uL


(0.0-1.1)


 


Eosinophils # (Auto)


 0.1 x10^3/uL


(0.0-0.7) 


 


 0.3 x10^3/uL


(0.0-0.7)


 


Basophils # (Auto)


 0.0 x10^3/uL


(0.0-0.2) 


 


 0.1 x10^3/uL


(0.0-0.2)


 


Sodium Level


 136 mmol/L


(136-145) 


 


 134 mmol/L


(136-145)


 


Potassium Level


 3.6 mmol/L


(3.5-5.1) 


 


 3.9 mmol/L


(3.5-5.1)


 


Chloride Level


 94 mmol/L


() 


 


 94 mmol/L


()


 


Carbon Dioxide Level


 30 mmol/L


(21-32) 


 


 27 mmol/L


(21-32)


 


Anion Gap 12 (6-14)    13 (6-14) 


 


Blood Urea Nitrogen


 30 mg/dL


(8-26) 


 


 42 mg/dL


(8-26)


 


Creatinine


 3.0 mg/dL


(0.7-1.3) 


 


 3.9 mg/dL


(0.7-1.3)


 


Estimated GFR


(Cockcroft-Gault) 21.8 


 


 


 16.1 





 


BUN/Creatinine Ratio 10 (6-20)    11 (6-20) 


 


Glucose Level


 103 mg/dL


(70-99) 


 


 71 mg/dL


(70-99)


 


Lactic Acid Level


 1.2 mmol/L


(0.4-2.0) 


 


 





 


Calcium Level


 8.5 mg/dL


(8.5-10.1) 


 


 8.3 mg/dL


(8.5-10.1)


 


Total Bilirubin


 0.5 mg/dL


(0.2-1.0) 


 


 0.4 mg/dL


(0.2-1.0)


 


Aspartate Amino Transf


(AST/SGOT) 17 U/L (15-37) 


 


 


 24 U/L (15-37) 





 


Alanine Aminotransferase


(ALT/SGPT) 24 U/L (16-63) 


 


 


 23 U/L (16-63) 





 


Alkaline Phosphatase


 90 U/L


() 


 


 113 U/L


()


 


Total Protein


 8.2 g/dL


(6.4-8.2) 


 


 8.1 g/dL


(6.4-8.2)


 


Albumin


 2.9 g/dL


(3.4-5.0) 


 


 2.7 g/dL


(3.4-5.0)


 


Albumin/Globulin Ratio 0.5 (1.0-1.7)    0.5 (1.0-1.7) 


 


Glucose (Fingerstick)


 


 69 mg/dL


(70-99) 111 mg/dL


(70-99) 





 


Test


 7/16/19


07:21 7/16/19


11:24 7/16/19


16:40 7/16/19


19:53


 


Glucose (Fingerstick)


 75 mg/dL


(70-99) 132 mg/dL


(70-99) 105 mg/dL


(70-99) 206 mg/dL


(70-99)


 


Test


 7/17/19


07:25 


 


 





 


Glucose (Fingerstick)


 83 mg/dL


(70-99) 


 


 











Laboratory Tests








Test


 7/16/19


11:24 7/16/19


16:40 7/16/19


19:53 7/17/19


07:25


 


Glucose (Fingerstick)


 132 mg/dL


(70-99) 105 mg/dL


(70-99) 206 mg/dL


(70-99) 83 mg/dL


(70-99)








Review


All relevant outside records, renal labs, imaging studies, telemetry/EKG's were 

reviewed.











LIN RUSSELL MD                Jul 17, 2019 10:58

## 2019-07-17 NOTE — NUR
rapid Resp: Called to rm 574 by nursing for pt c/o chest pressure. Pt just returned from 
[facility] post I&D amp. Pt very alert and hand on Lt side of chest. VSS. 161/84 99%on RA. HR 95 
not on tele. Dialysis before surgery this AM. Pain 5/10. Pt had CABG 2017. Pt stated this 
discomfort was in no way similar to his Chest pain then. Pt stated he has gastric reflux 
pretty bad and stated he had to lay flat all day. EKG done w/o changes noted. SR. Pt offered 
seven up and after several belches stated the feeling was going away. Dr Concepcion called back 
and orders placed for TRop x 2 and Pepcid. HOB placed up on bed. Remians in 5 so

## 2019-07-17 NOTE — PDOC
Infectious Disease Note


Subjective


Subjective


Had a lot of pain last pm. Awaiting surgery


+ BM. Occ nausea but no vomit


No f/C/S/SOA/Rash





Vital Sign


Vital Signs





Vital Signs








  Date Time  Temp Pulse Resp B/P (MAP) Pulse Ox O2 Delivery O2 Flow Rate FiO2


 


7/17/19 10:01   18  95 Room Air  


 


7/17/19 07:00 98.3 82  138/75 (96)    





 98.3       











Physical Exam


PHYSICAL EXAM


CONSTITUTIONAL:  He is cooperative.  He is in no acute distress. in HD


HEENT:  Pupils are equal and reactive with normal conjunctivae.  Oral cavity and


pharynx, has poor dentition.


NECK:  Supple.  Good range of motion.


LUNGS:  Clear to auscultation bilaterally.


HEART:  S1, S2.


ABDOMEN:  Obese, soft, nontender, no guarding or no rebound.


EXTREMITIES:  Without clubbing, cyanosis.  His left knee- is dressed. tender


SKIN:  Otherwise, warm to touch without signs of rash.  AV fistula is without


signs of any complications.


NEUROLOGIC:  He is nonfocal.  Affect is appropriate.





Labs


Lab





Laboratory Tests








Test


 7/16/19


11:24 7/16/19


16:40 7/16/19


19:53 7/17/19


07:25


 


Glucose (Fingerstick)


 132 mg/dL


(70-99) 105 mg/dL


(70-99) 206 mg/dL


(70-99) 83 mg/dL


(70-99)








Micro





Microbiology


7/15/19 Blood Culture - Preliminary, Resulted


          NO GROWTH AFTER 1 DAY





Objective


Assessment


Fever


Left LE stump infection - Ct reviewed


S/p fall


CKD on HD


DM





Plan


Plan of Care


Cont Vanc/Zosyn and Zyvox


Appreciate consult by Ortho await surgery


F/u cults from wound


F/u labs and cults





D/w nursing











BRANDON MCLAUGHLIN MD              Jul 17, 2019 10:18

## 2019-07-18 NOTE — NUR
Pharmacy Vancomycin Dosing Note



S: Consulted to monitor and dose vancomycin started 07/16/19.



O: ARABELLA WASHINGTON is a 55 year old M with Sepsis, .



Other Antibiotics: ZOSYN, ZYVOX PO



LABS:

Last BUN: 48 

Last Creatinine: 4.3 

Creatinine Clearance: 22 mL/min

Last WBC: 8.6 

Tmax (past 24 hours): AFEBRILE 



Drug Levels:

Last Random level: 19.1  on 07/18/19 at 0545 

Last dose given 07/16/19 at 1200 



Vancomycin Dosing:

Dosing Weight: Actual

Target Trough: 15-20





A: Based on:  VANCO dosing guidelines





P: 1. Vancomycin 500 mg IV AFTER DIALYSIS MWF

   2. Follow up Random level if needed

   3. Pharmacy will continue to monitor, follow and adjust therapy as needed.

 

 SALMA JIMENEZ [facility], 07/18/19 0096

## 2019-07-18 NOTE — PDOC
SUBJECTIVE


Subjective


I&D yesterday with large abscess found to have contact with residual tibia. Upon

returning to the floor from PACU, pt complained of chest pain, rapid response 

was called. Trop x2 was neg. EKG ok. Symptoms improved with belching and pepcid.

No further chest pain, shortness of breath or other concerning symptoms. Pain is

adequately controlled.





OBJECTIVE


Objective


Reviewed.


Vital Signs





Vital Signs








  Date Time  Temp Pulse Resp B/P (MAP) Pulse Ox O2 Delivery O2 Flow Rate FiO2


 


7/18/19 03:00 97.9 65 18 114/64 (81) 95 Room Air 8.0 





 97.9       


 


7/18/19 02:43   20   Room Air  


 


7/17/19 23:00 97.4 76 18 115/65 (82) 99 Room Air 8.0 





 97.4       


 


7/17/19 22:48   20   Room Air  


 


7/17/19 21:19   20   Room Air  


 


7/17/19 20:20      Room Air  


 


7/17/19 19:00 98.9 90 18 124/70 (88) 98 Room Air 8.0 





 98.9       


 


7/17/19 18:44  96  161/84    


 


7/17/19 18:30  96  161/84    


 


7/17/19 18:29  96  161/84    


 


7/17/19 18:28   18  95 Room Air  


 


7/17/19 17:36   16  95 Room Air  


 


7/17/19 17:25 100.0 98 16 173/47 94 Room Air  





 100.0       


 


7/17/19 17:25   16  94 Room Air  


 


7/17/19 17:13   14  95 Room Air  


 


7/17/19 17:10  95 18 159/40 96 Room Air  


 


7/17/19 17:03   24  100 Room Air  


 


7/17/19 17:00   18   Room Air  


 


7/17/19 16:55  94 14 180/45 100 Simple Mask 8 


 


7/17/19 16:55   14  100 Simple Mask 8.0 


 


7/17/19 16:44   16  100 Simple Mask 8.0 


 


7/17/19 16:38 97.4 88 20 169/43 100 Simple Mask 8 





 97.4       


 


7/17/19 15:00   16  96 Room Air  


 


7/17/19 14:48   16  94 Room Air  


 


7/17/19 13:11 98.3 82 15 126/59 95 Room Air  





 98.3       


 


7/17/19 10:31   20  95 Room Air  


 


7/17/19 10:01   18  95 Room Air  








I & O











Intake and Output 


 


 7/18/19





 06:59


 


Intake Total 500 ml


 


Output Total 825 ml


 


Balance -325 ml


 


 


 


Intake Oral 200 ml


 


IV Total 300 ml


 


Output Urine Total 800 ml


 


Estimated Blood Loss 25 ml











PHYSICAL EXAM


Physical Exam


GENERAL: Alert, oriented


LUNGS:  Clear.


CARDIOVASCULAR:  Regular rate. 2/6 systolic murmur.


ABDOMEN:  Benign and nontender, no masses, nondistended


EXTREMITIES:  The left below-the-knee stump has a large dressing in place


covering the wounds. C/D/I. Tenderness has decreased from yesterday


NEUROLOGIC:  Physiologic and nonfocal.





ASSESSMENT/PLAN


Assessment/Plan


Left below-the-knee stump injury with lacerations and proximal nondisplaced 

fibular fracture with extension to the tibiofibular joint


Abscess of stump s/p I&D with evidence of abscess contacting bone 


Intractable left leg pain


Opioid dependence


ESRD on dialysis


HTN


DM2


PAD s/p L BKA





Abx per ID, will likely need long term IV abx given risk of osteomyelitis and 

risk of poor wound healing given DM2 and PAD


Pain control


Await cultures





COMMENT


Lab





Laboratory Tests








Test


 7/17/19


11:51 7/17/19


16:44 7/17/19


17:56 7/17/19


19:30


 


Glucose (Fingerstick)


 87 mg/dL


(70-99) 112 mg/dL


(70-99) 134 mg/dL


(70-99) 





 


Troponin I Quantitative


 


 


 


 < 0.017 ng/mL


(0.000-0.055)


 


NT-Pro-B-Type Natriuretic


Peptide 


 


 


 27186 pg/mL


(0-124)


 


Test


 7/17/19


20:05 7/17/19


23:55 7/18/19


05:45 





 


Glucose (Fingerstick)


 272 mg/dL


(70-99) 


 


 





 


Troponin I Quantitative


 


 < 0.017 ng/mL


(0.000-0.055) 


 





 


White Blood Count


 


 


 8.6 x10^3/uL


(4.0-11.0) 





 


Red Blood Count


 


 


 3.36 x10^6/uL


(4.30-5.70) 





 


Hemoglobin


 


 


 9.1 g/dL


(13.0-17.5) 





 


Hematocrit


 


 


 27.9 %


(39.0-53.0) 





 


Mean Corpuscular Volume   83 fL ()  


 


Mean Corpuscular Hemoglobin   27 pg (25-35)  


 


Mean Corpuscular Hemoglobin


Concent 


 


 33 g/dL


(31-37) 





 


Red Cell Distribution Width


 


 


 16.5 %


(11.5-14.5) 





 


Platelet Count


 


 


 476 x10^3/uL


(140-400) 





 


Neutrophils (%) (Auto)   80 % (31-73)  


 


Lymphocytes (%) (Auto)   12 % (24-48)  


 


Monocytes (%) (Auto)   7 % (0-9)  


 


Eosinophils (%) (Auto)   0 % (0-3)  


 


Basophils (%) (Auto)   1 % (0-3)  


 


Neutrophils # (Auto)


 


 


 6.8 x10^3/uL


(1.8-7.7) 





 


Lymphocytes # (Auto)


 


 


 1.1 x10^3/uL


(1.0-4.8) 





 


Monocytes # (Auto)


 


 


 0.6 x10^3/uL


(0.0-1.1) 





 


Eosinophils # (Auto)


 


 


 0.0 x10^3/uL


(0.0-0.7) 





 


Basophils # (Auto)


 


 


 0.1 x10^3/uL


(0.0-0.2) 





 


Sodium Level


 


 


 137 mmol/L


(136-145) 





 


Potassium Level


 


 


 4.8 mmol/L


(3.5-5.1) 





 


Chloride Level


 


 


 97 mmol/L


() 





 


Carbon Dioxide Level


 


 


 26 mmol/L


(21-32) 





 


Anion Gap   14 (6-14)  


 


Blood Urea Nitrogen


 


 


 48 mg/dL


(8-26) 





 


Creatinine


 


 


 4.3 mg/dL


(0.7-1.3) 





 


Estimated GFR


(Cockcroft-Gault) 


 


 14.4 


 





 


Glucose Level


 


 


 176 mg/dL


(70-99) 





 


Calcium Level


 


 


 8.9 mg/dL


(8.5-10.1) 





 


Random Vancomycin Level   19.1 mcg/mL  

















MARY CALDERA MD            Jul 18, 2019 08:25

## 2019-07-18 NOTE — EKG
[address] [place] [id number]

Test Date:    2019               Test Time:    18:02:11

Pat Name:     ARABELLA WASHINGTON           Department:   

Patient ID:   [id number]           Room:         574 1

Gender:       M                        Technician:   

:          [id number]               Requested By: ARIC MATUTE

Order Number: [id number]           Reading MD:     

                                 Measurements

Intervals                              Axis          

Rate:         97                       P:            48

KS:           194                      QRS:          -34

QRSD:         92                       T:            53

QT:           366                                    

QTc:          469                                    

                           Interpretive Statements

SINUS RHYTHM

ABNORMAL LEFT AXIS DEVIATION

LEFT ANTERIOR FASCICULAR BLOCK

QRS(T) CONTOUR ABNORMALITY

CONSIDER ANTEROSEPTAL MYOCARDIAL DAMAGE

ABNORMAL ECG

RI6.01

No previous ECG available for comparison

## 2019-07-18 NOTE — PDOC
SUBJECTIVE


ROS


States feeling alright, No new complaints, had some chest pressure last night 

was thought to be GERd, asymptomatic  currently





OBJECTIVE


Vital Signs





Vital Signs








  Date Time  Temp Pulse Resp B/P (MAP) Pulse Ox O2 Delivery O2 Flow Rate FiO2


 


7/18/19 10:55      Room Air  


 


7/18/19 08:47  74  139/70    


 


7/18/19 07:00 98.0  17  96   





 98.0       


 


7/18/19 03:00       8.0 








I & 0











Intake and Output 


 


 7/18/19





 06:59


 


Intake Total 500 ml


 


Output Total 825 ml


 


Balance -325 ml


 


 


 


Intake Oral 200 ml


 


IV Total 300 ml


 


Output Urine Total 800 ml


 


Estimated Blood Loss 25 ml











PHYSICAL EXAM


Physical Exam


GEN: NAD


HEENT:OM moist 


NECK:  Supple.  


LUNGS:  Clear to auscultation bilaterally.


HEART:  S1, S2.


ABDOMEN:  Obese, soft, nontender, 


EXTREMITIES:  Lt BKA, No edema , AVFLt arm , Good thrill and Bruit


SKIN:  No Rash  


NEUROLOGIC: Grossly Normal


 No Hernandez


Vital Signs








DIAGNOSIS/ASSESSMENT


Assessment & Plan


ESRD- on HD MWF


No Indication for HD currently 


Tomorrow per his schedule  





Left below-the-knee stump injury with lacerations and proximal nondisplaced 

fibular fracture with extension to the tibiofibular joint





Abscess of stump s/p I&D with evidence of abscess contacting bone 





Opioid dependence





HTN- BP stable , antihypertensives


On lasix  as well  per Home meds  





PAD s/p L BKA





 Diabetes.





COMMENT/RELEVANT DATA


Meds





Current Medications








 Medications


  (Trade)  Dose


 Ordered  Sig/Yisel  Start Time


 Stop Time Status Last Admin


Dose Admin


 


 Albuterol Sulfate


  (Ventolin Neb


 Soln)  2.5 mg  PRN Q4HRS  PRN  7/15/19 21:15


     





 


 Amlodipine


 Besylate


  (Norvasc)  10 mg  DAILY  7/16/19 09:00


    7/18/19 08:46


10 MG


 


 Aspirin


  (Children'S


 Aspirin)  81 mg  DAILY  7/16/19 09:00


    7/18/19 08:48


81 MG


 


 Atorvastatin


 Calcium


  (Lipitor)  20 mg  HS  7/15/19 21:00


    7/17/19 21:10


20 MG


 


 Bupivacaine HCl/


 Epinephrine Bitart


  (Sensorcaine-Epi


 0.25%-1:954114


 Mpf)  30 ml  STK-MED ONCE  7/17/19 13:26


 7/17/19 14:26 DC  





 


 Clopidogrel


 Bisulfate


  (Plavix)  75 mg  DAILY  7/16/19 09:00


    7/18/19 08:45


75 MG


 


 Cyclobenzaprine


 HCl


  (Flexeril)  10 mg  TID PRN  PRN  7/15/19 20:45


    7/17/19 21:10


10 MG


 


 Desflurane


  (Suprane)  15 ml  STK-MED ONCE  7/17/19 16:21


 7/17/19 16:22 DC  





 


 Dexamethasone


 Sodium Phosphate


  (Decadron)  4 mg  STK-MED ONCE  7/17/19 16:04


 7/17/19 16:05 DC  





 


 Diphenhydramine


 HCl


  (Benadryl)  25 mg  1X PRN  PRN  7/17/19 07:45


 7/18/19 07:44 DC  





 


 Famotidine


  (Pepcid)  20 mg  Q48H  7/17/19 21:00


    7/17/19 21:10


20 MG


 


 Fentanyl Citrate


  (Fentanyl 2ml


 Vial)  100 mcg  STK-MED ONCE  7/17/19 16:41


 7/17/19 16:42 DC  





 


 Furosemide


  (Lasix)  40 mg  DAILY  7/16/19 09:00


    7/18/19 08:48


40 MG


 


 Gabapentin


  (Neurontin)  300 mg  DAILY  7/16/19 09:00


    7/18/19 08:46


300 MG


 


 Hydromorphone HCl


  (Dilaudid)  2 mg  PRN Q3HRS  PRN  7/17/19 13:45


     





 


 Info


  (PHARMACY


 MONITORING -- do


 not chart)  1 each  PRN DAILY  PRN  7/17/19 07:45


     





 


 Insulin Glargine


  (Lantus)  55 units  DAILY08  7/16/19 08:00


    7/18/19 08:53


55 UNITS


 


 Lactobacillus


 Rhamnosus


  (Culturelle)  1 cap  BID  7/16/19 21:00


    7/18/19 08:47


1 CAP


 


 Lidocaine HCl


  (Lidocaine Pf 2%


 Vial)  5 ml  STK-MED ONCE  7/17/19 15:01


 7/17/19 15:02 DC  





 


 Lidocaine HCl


  (Xylocaine-Mpf


 1% 2ml Vial)  2 ml  STK-MED ONCE  7/17/19 08:00


 7/18/19 08:57 DC  





 


 Linezolid


  (Zyvox)  600 mg  BID  7/16/19 11:30


    7/18/19 08:46


600 MG


 


 Losartan Potassium


  (Cozaar)  50 mg  DAILY  7/16/19 09:00


    7/18/19 08:47


50 MG


 


 Metoprolol


 Succinate


  (Toprol Xl)  25 mg  DAILY  7/16/19 09:00


    7/18/19 08:47


25 MG


 


 Morphine Sulfate


  (Morphine


 Sulfate)  1 mg  PRN Q10MIN  PRN  7/17/19 07:30


 7/18/19 07:29 DC  





 


 Non-Formulary


 Medication


  (Albuterol


 Sulfate (Ventolin


 Hfa Inhaler))  2 puff  Q4HRS  PRN  7/15/19 20:45


   UNV  





 


 Ondansetron HCl


  (Zofran)  4 mg  STK-MED ONCE  7/17/19 16:04


 7/17/19 16:05 DC  





 


 Oxycodone HCl


  (Roxicodone)  5 mg  PRN Q6HRS  PRN  7/16/19 08:15


 7/17/19 08:41 DC 7/16/19 19:46


5 MG


 


 Oxycodone/


 Acetaminophen


  (Percocet 10/325)  1 tab  PRN Q4HRS  PRN  7/17/19 17:00


    7/18/19 09:47


1 TAB


 


 Piperacillin Sod/


 Tazobactam Sod


 2.25 gm/Sodium


 Chloride  50 ml @ 


 100 mls/hr  Q8HRS  7/16/19 14:00


    7/18/19 06:27


100 MLS/HR


 


 Prochlorperazine


 Edisylate


  (Compazine)  5 mg  PACU PRN  PRN  7/17/19 07:30


 7/18/19 07:29 DC  





 


 Propofol  20 ml @ As


 Directed  STK-MED ONCE  7/17/19 15:01


 7/17/19 15:02 DC  





 


 Ringer's Solution  1,000 ml @ 


 30 mls/hr  Q24H  7/17/19 07:24


 7/17/19 19:23 DC  





 


 Sodium Chloride  1,000 ml @ 


 400 mls/hr  Q2H30M PRN  7/17/19 07:31


 7/17/19 19:30 DC 7/17/19 13:15


400 MLS/HR


 


 Vancomycin HCl


  (Vanco Per


 Pharmacy)  1 each  PRN DAILY  PRN  7/16/19 11:15


    7/17/19 08:48


1 EACH


 


 Vancomycin HCl


  (Vancomycin


 Random Level)  1 each  1X  ONCE  7/18/19 06:00


 7/18/19 06:01 DC 7/18/19 06:00


1 EACH


 


 Vancomycin HCl 2


 gm/Sodium Chloride  500 ml @ 


 250 mls/hr  1X  ONCE  7/16/19 12:00


 7/16/19 13:59 DC 7/16/19 11:46


250 MLS/HR








Lab





Laboratory Tests








Test


 7/17/19


11:51 7/17/19


16:44 7/17/19


17:56 7/17/19


19:30


 


Glucose (Fingerstick)


 87 mg/dL


(70-99) 112 mg/dL


(70-99) 134 mg/dL


(70-99) 





 


Troponin I Quantitative


 


 


 


 < 0.017 ng/mL


(0.000-0.055)


 


NT-Pro-B-Type Natriuretic


Peptide 


 


 


 73692 pg/mL


(0-124)


 


Test


 7/17/19


20:05 7/17/19


23:55 7/18/19


05:45 7/18/19


07:45


 


Glucose (Fingerstick)


 272 mg/dL


(70-99) 


 


 141 mg/dL


(70-99)


 


Troponin I Quantitative


 


 < 0.017 ng/mL


(0.000-0.055) 


 





 


White Blood Count


 


 


 8.6 x10^3/uL


(4.0-11.0) 





 


Red Blood Count


 


 


 3.36 x10^6/uL


(4.30-5.70) 





 


Hemoglobin


 


 


 9.1 g/dL


(13.0-17.5) 





 


Hematocrit


 


 


 27.9 %


(39.0-53.0) 





 


Mean Corpuscular Volume   83 fL ()  


 


Mean Corpuscular Hemoglobin   27 pg (25-35)  


 


Mean Corpuscular Hemoglobin


Concent 


 


 33 g/dL


(31-37) 





 


Red Cell Distribution Width


 


 


 16.5 %


(11.5-14.5) 





 


Platelet Count


 


 


 476 x10^3/uL


(140-400) 





 


Neutrophils (%) (Auto)   80 % (31-73)  


 


Lymphocytes (%) (Auto)   12 % (24-48)  


 


Monocytes (%) (Auto)   7 % (0-9)  


 


Eosinophils (%) (Auto)   0 % (0-3)  


 


Basophils (%) (Auto)   1 % (0-3)  


 


Neutrophils # (Auto)


 


 


 6.8 x10^3/uL


(1.8-7.7) 





 


Lymphocytes # (Auto)


 


 


 1.1 x10^3/uL


(1.0-4.8) 





 


Monocytes # (Auto)


 


 


 0.6 x10^3/uL


(0.0-1.1) 





 


Eosinophils # (Auto)


 


 


 0.0 x10^3/uL


(0.0-0.7) 





 


Basophils # (Auto)


 


 


 0.1 x10^3/uL


(0.0-0.2) 





 


Sodium Level


 


 


 137 mmol/L


(136-145) 





 


Potassium Level


 


 


 4.8 mmol/L


(3.5-5.1) 





 


Chloride Level


 


 


 97 mmol/L


() 





 


Carbon Dioxide Level


 


 


 26 mmol/L


(21-32) 





 


Anion Gap   14 (6-14)  


 


Blood Urea Nitrogen


 


 


 48 mg/dL


(8-26) 





 


Creatinine


 


 


 4.3 mg/dL


(0.7-1.3) 





 


Estimated GFR


(Cockcroft-Gault) 


 


 14.4 


 





 


Glucose Level


 


 


 176 mg/dL


(70-99) 





 


Calcium Level


 


 


 8.9 mg/dL


(8.5-10.1) 





 


Random Vancomycin Level   19.1 mcg/mL  








Results


All relevant outside records, renal labs, imaging studies, telemetry/EKG's were 

reviewed.











LIN RUSSELL MD                Jul 18, 2019 11:42

## 2019-07-18 NOTE — PDOC
Infectious Disease Note


Subjective


Subjective


Less pain but still bad at times


Tolerated surgery


+ BM. Occ nausea but no vomit


No f/C/S/SOA/Rash





Vital Sign


Vital Signs





Vital Signs








  Date Time  Temp Pulse Resp B/P (MAP) Pulse Ox O2 Delivery O2 Flow Rate FiO2


 


7/18/19 10:55      Room Air  


 


7/18/19 08:47  74  139/70    


 


7/18/19 07:00 98.0  17  96   





 98.0       


 


7/18/19 03:00       8.0 











Physical Exam


PHYSICAL EXAM


CONSTITUTIONAL:  He is cooperative.  He is in no acute distress. in bed and 

looks better


HEENT:  Pupils are equal and reactive with normal conjunctivae.  Oral cavity and


pharynx, has poor dentition.


NECK:  Supple.  Good range of motion.


LUNGS:  Clear to auscultation bilaterally.


HEART:  S1, S2.


ABDOMEN:  Obese, soft, nontender, no guarding or no rebound.


EXTREMITIES:  Without clubbing, cyanosis.  His left knee- is dressed


SKIN:  Otherwise, warm to touch without signs of rash.  AV fistula is without


signs of any complications.


NEUROLOGIC:  He is nonfocal.  Affect is appropriate.





Labs


Lab





Laboratory Tests








Test


 7/17/19


11:51 7/17/19


16:44 7/17/19


17:56 7/17/19


19:30


 


Glucose (Fingerstick)


 87 mg/dL


(70-99) 112 mg/dL


(70-99) 134 mg/dL


(70-99) 





 


Troponin I Quantitative


 


 


 


 < 0.017 ng/mL


(0.000-0.055)


 


NT-Pro-B-Type Natriuretic


Peptide 


 


 


 84647 pg/mL


(0-124)


 


Test


 7/17/19


20:05 7/17/19


23:55 7/18/19


05:45 7/18/19


07:45


 


Glucose (Fingerstick)


 272 mg/dL


(70-99) 


 


 141 mg/dL


(70-99)


 


Troponin I Quantitative


 


 < 0.017 ng/mL


(0.000-0.055) 


 





 


White Blood Count


 


 


 8.6 x10^3/uL


(4.0-11.0) 





 


Red Blood Count


 


 


 3.36 x10^6/uL


(4.30-5.70) 





 


Hemoglobin


 


 


 9.1 g/dL


(13.0-17.5) 





 


Hematocrit


 


 


 27.9 %


(39.0-53.0) 





 


Mean Corpuscular Volume   83 fL ()  


 


Mean Corpuscular Hemoglobin   27 pg (25-35)  


 


Mean Corpuscular Hemoglobin


Concent 


 


 33 g/dL


(31-37) 





 


Red Cell Distribution Width


 


 


 16.5 %


(11.5-14.5) 





 


Platelet Count


 


 


 476 x10^3/uL


(140-400) 





 


Neutrophils (%) (Auto)   80 % (31-73)  


 


Lymphocytes (%) (Auto)   12 % (24-48)  


 


Monocytes (%) (Auto)   7 % (0-9)  


 


Eosinophils (%) (Auto)   0 % (0-3)  


 


Basophils (%) (Auto)   1 % (0-3)  


 


Neutrophils # (Auto)


 


 


 6.8 x10^3/uL


(1.8-7.7) 





 


Lymphocytes # (Auto)


 


 


 1.1 x10^3/uL


(1.0-4.8) 





 


Monocytes # (Auto)


 


 


 0.6 x10^3/uL


(0.0-1.1) 





 


Eosinophils # (Auto)


 


 


 0.0 x10^3/uL


(0.0-0.7) 





 


Basophils # (Auto)


 


 


 0.1 x10^3/uL


(0.0-0.2) 





 


Sodium Level


 


 


 137 mmol/L


(136-145) 





 


Potassium Level


 


 


 4.8 mmol/L


(3.5-5.1) 





 


Chloride Level


 


 


 97 mmol/L


() 





 


Carbon Dioxide Level


 


 


 26 mmol/L


(21-32) 





 


Anion Gap   14 (6-14)  


 


Blood Urea Nitrogen


 


 


 48 mg/dL


(8-26) 





 


Creatinine


 


 


 4.3 mg/dL


(0.7-1.3) 





 


Estimated GFR


(Cockcroft-Gault) 


 


 14.4 


 





 


Glucose Level


 


 


 176 mg/dL


(70-99) 





 


Calcium Level


 


 


 8.9 mg/dL


(8.5-10.1) 





 


Random Vancomycin Level   19.1 mcg/mL  








Micro





Microbiology


7/15/19 Blood Culture - Preliminary, Resulted


          NO GROWTH AFTER 1 DAY





Objective


Assessment


Fever - better


Left LE stump infection -s/p I and D 7/17


S/p fall


CKD on HD


DM





Plan


Plan of Care


Cont Vanc/Zosyn and Zyvox


F/u cults from wound


F/u labs and cults





D/w nursing











BRANDON MCLAUGHLIN MD              Jul 18, 2019 11:21

## 2019-07-19 NOTE — PDOC
Infectious Disease Note


Subjective


Subjective


Less pain but still bad at times


Asking about wound vac 


+ BM.


No f/C/S/SOA/Rash





ROS


ROS


o/w neg





Vital Sign


Vital Signs





Vital Signs








  Date Time  Temp Pulse Resp B/P (MAP) Pulse Ox O2 Delivery O2 Flow Rate FiO2


 


7/19/19 07:31   18   Room Air  


 


7/19/19 07:00 98.2 80  147/75 (99) 97   





 98.2       











Physical Exam


PHYSICAL EXAM


CONSTITUTIONAL:  He is cooperative.  He is in no acute distress. in bed and 

looks better. In HD


HEENT:  Pupils are equal and reactive with normal conjunctivae.  Oral cavity and


pharynx, has poor dentition.


NECK:  Supple.  Good range of motion.


LUNGS:  Clear to auscultation bilaterally.


HEART:  S1, S2.


ABDOMEN:  Obese, soft, nontender, no guarding or no rebound.


EXTREMITIES:  Without clubbing, cyanosis.  His left knee- is dressed


SKIN:  Otherwise, warm to touch without signs of rash.  AV fistula is without


signs of any complications.


NEUROLOGIC:  He is nonfocal.  Affect is appropriate.





Labs


Lab





Laboratory Tests








Test


 7/18/19


11:07 7/18/19


16:55 7/18/19


21:22 7/19/19


04:05


 


Glucose (Fingerstick)


 204 mg/dL


(70-99) 192 mg/dL


(70-99) 169 mg/dL


(70-99) 





 


White Blood Count


 


 


 


 9.9 x10^3/uL


(4.0-11.0)


 


Red Blood Count


 


 


 


 3.46 x10^6/uL


(4.30-5.70)


 


Hemoglobin


 


 


 


 9.1 g/dL


(13.0-17.5)


 


Hematocrit


 


 


 


 29.1 %


(39.0-53.0)


 


Mean Corpuscular Volume    84 fL () 


 


Mean Corpuscular Hemoglobin    26 pg (25-35) 


 


Mean Corpuscular Hemoglobin


Concent 


 


 


 31 g/dL


(31-37)


 


Red Cell Distribution Width


 


 


 


 16.3 %


(11.5-14.5)


 


Platelet Count


 


 


 


 518 x10^3/uL


(140-400)


 


Neutrophils (%) (Auto)    60 % (31-73) 


 


Lymphocytes (%) (Auto)    25 % (24-48) 


 


Monocytes (%) (Auto)    9 % (0-9) 


 


Eosinophils (%) (Auto)    5 % (0-3) 


 


Basophils (%) (Auto)    2 % (0-3) 


 


Neutrophils # (Auto)


 


 


 


 5.9 x10^3/uL


(1.8-7.7)


 


Lymphocytes # (Auto)


 


 


 


 2.4 x10^3/uL


(1.0-4.8)


 


Monocytes # (Auto)


 


 


 


 0.8 x10^3/uL


(0.0-1.1)


 


Eosinophils # (Auto)


 


 


 


 0.5 x10^3/uL


(0.0-0.7)


 


Basophils # (Auto)


 


 


 


 0.2 x10^3/uL


(0.0-0.2)


 


Sodium Level


 


 


 


 139 mmol/L


(136-145)


 


Potassium Level


 


 


 


 4.0 mmol/L


(3.5-5.1)


 


Chloride Level


 


 


 


 99 mmol/L


()


 


Carbon Dioxide Level


 


 


 


 27 mmol/L


(21-32)


 


Anion Gap    13 (6-14) 


 


Blood Urea Nitrogen


 


 


 


 65 mg/dL


(8-26)


 


Creatinine


 


 


 


 5.1 mg/dL


(0.7-1.3)


 


Estimated GFR


(Cockcroft-Gault) 


 


 


 11.8 





 


Glucose Level


 


 


 


 101 mg/dL


(70-99)


 


Calcium Level


 


 


 


 8.8 mg/dL


(8.5-10.1)


 


Test


 7/19/19


07:40 


 


 





 


Glucose (Fingerstick)


 85 mg/dL


(70-99) 


 


 











Micro





Microbiology


7/15/19 Blood Culture - Preliminary, Resulted


          NO GROWTH AFTER 1 DAY





Objective


Assessment


Fever - better


Left LE stump infection -s/p I and D 7/17 cult pending - Op note states bone not

infected but infection in contact with bone - recommending IV abx


S/p fall


CKD on HD


DM





Plan


Plan of Care


Cont Vanc/Zosyn and Zyvox for now hopefully set up abx with HD


F/u cults from wound


Wound care per Ortho - op note does not mention need for vac


F/u labs and cults





D/w nursing











BRANDON MCLAUGHLIN MD              Jul 19, 2019 09:02

## 2019-07-19 NOTE — PDOC
SUBJECTIVE


ROS


Seen on HD, tolerating well





OBJECTIVE


Vital Signs





Vital Signs








  Date Time  Temp Pulse Resp B/P (MAP) Pulse Ox O2 Delivery O2 Flow Rate FiO2


 


7/19/19 07:31   18   Room Air  


 


7/19/19 07:00 98.2 80  147/75 (99) 97   





 98.2       








I & 0











Intake and Output 


 


 7/19/19





 07:00


 


Intake Total 240 ml


 


Output Total 1025 ml


 


Balance -785 ml


 


 


 


Intake Oral 240 ml


 


Output Urine Total 1025 ml


 


# Voids 2


 


# Bowel Movements 3











PHYSICAL EXAM


Physical Exam


GEN: NAD


HEENT:OM moist 


NECK:  Supple.  


LUNGS:  Clear to auscultation bilaterally.


HEART:  S1, S2.


ABDOMEN:  Obese, soft, nontender, 


EXTREMITIES:  Lt BKA, No edema , AVFLt arm , Good thrill and Bruit


SKIN:  No Rash  


NEUROLOGIC: Grossly Normal


 No Hernandez





DIAGNOSIS/ASSESSMENT


Assessment & Plan


ESRD- on HD MWF


Seen on HD, tolerating well, continue as ordered 





Left below-the-knee stump injury with lacerations and proximal nondisplaced 

fibular fracture with extension to the tibiofibular joint





Abscess of stump s/p I&D with evidence of abscess contacting bone 





Opioid dependence





HTN- BP stable , antihypertensives


On lasix  as well  per Home meds  





PAD s/p L BKA





 Diabetes.





COMMENT/RELEVANT DATA


Meds





Current Medications








 Medications


  (Trade)  Dose


 Ordered  Sig/Yisel  Start Time


 Stop Time Status Last Admin


Dose Admin


 


 Albuterol Sulfate


  (Ventolin Neb


 Soln)  2.5 mg  PRN Q4HRS  PRN  7/15/19 21:15


     





 


 Amlodipine


 Besylate


  (Norvasc)  10 mg  DAILY  7/16/19 09:00


    7/18/19 08:46


10 MG


 


 Aspirin


  (Children'S


 Aspirin)  81 mg  DAILY  7/16/19 09:00


    7/18/19 08:48


81 MG


 


 Atorvastatin


 Calcium


  (Lipitor)  20 mg  HS  7/15/19 21:00


    7/18/19 21:22


20 MG


 


 Bupivacaine HCl/


 Epinephrine Bitart


  (Sensorcaine-Epi


 0.25%-1:[id number]


 Mpf)  30 ml  STK-MED ONCE  7/17/19 13:26


 7/17/19 14:26 DC  





 


 Clopidogrel


 Bisulfate


  (Plavix)  75 mg  DAILY  7/16/19 09:00


    7/18/19 08:45


75 MG


 


 Cyclobenzaprine


 HCl


  (Flexeril)  10 mg  TID PRN  PRN  7/15/19 20:45


    7/17/19 21:10


10 MG


 


 Desflurane


  (Suprane)  15 ml  STK-MED ONCE  7/17/19 16:21


 7/17/19 16:22 DC  





 


 Dexamethasone


 Sodium Phosphate


  (Decadron)  4 mg  STK-MED ONCE  7/17/19 16:04


 7/17/19 16:05 DC  





 


 Diphenhydramine


 HCl


  (Benadryl)  25 mg  1X PRN  PRN  7/19/19 09:00


 7/20/19 08:59   





 


 Famotidine


  (Pepcid)  20 mg  Q48H  7/17/19 21:00


    7/17/19 21:10


20 MG


 


 Fentanyl Citrate


  (Fentanyl 2ml


 Vial)  100 mcg  STK-MED ONCE  7/17/19 16:41


 7/17/19 16:42 DC  





 


 Furosemide


  (Lasix)  40 mg  DAILY  7/16/19 09:00


    7/18/19 08:48


40 MG


 


 Gabapentin


  (Neurontin)  300 mg  DAILY  7/16/19 09:00


    7/18/19 08:46


300 MG


 


 Hydromorphone HCl


  (Dilaudid)  2 mg  PRN Q3HRS  PRN  7/17/19 13:45


    7/19/19 06:31


2 MG


 


 Info


  (PHARMACY


 MONITORING -- do


 not chart)  1 each  PRN DAILY  PRN  7/19/19 09:00


   UNV  





 


 Insulin Glargine


  (Lantus)  55 units  DAILY08  7/16/19 08:00


    7/18/19 08:53


55 UNITS


 


 Lactobacillus


 Rhamnosus


  (Culturelle)  1 cap  BID  7/16/19 21:00


    7/18/19 21:22


1 CAP


 


 Lidocaine HCl


  (Lidocaine Pf 2%


 Vial)  5 ml  STK-MED ONCE  7/17/19 15:01


 7/17/19 15:02 DC  





 


 Lidocaine HCl


  (Xylocaine-Mpf


 1% 2ml Vial)  2 ml  STK-MED ONCE  7/19/19 08:40


 7/19/19 08:41 DC  





 


 Linezolid


  (Zyvox)  600 mg  BID  7/16/19 11:30


    7/18/19 21:22


600 MG


 


 Losartan Potassium


  (Cozaar)  50 mg  DAILY  7/16/19 09:00


    7/18/19 08:47


50 MG


 


 Metoprolol


 Succinate


  (Toprol Xl)  25 mg  DAILY  7/16/19 09:00


    7/18/19 08:47


25 MG


 


 Morphine Sulfate


  (Morphine


 Sulfate)  1 mg  PRN Q10MIN  PRN  7/17/19 07:30


 7/18/19 07:29 DC  





 


 Non-Formulary


 Medication


  (Albuterol


 Sulfate (Ventolin


 Hfa Inhaler))  2 puff  Q4HRS  PRN  7/15/19 20:45


   UNV  





 


 Ondansetron HCl


  (Zofran)  4 mg  STK-MED ONCE  7/17/19 16:04


 7/17/19 16:05 DC  





 


 Oxycodone HCl


  (Roxicodone)  5 mg  PRN Q6HRS  PRN  7/16/19 08:15


 7/17/19 08:41 DC 7/16/19 19:46


5 MG


 


 Oxycodone/


 Acetaminophen


  (Percocet 10/325)  1 tab  QID  7/19/19 09:15


     





 


 Piperacillin Sod/


 Tazobactam Sod


 2.25 gm/Sodium


 Chloride  50 ml @ 


 100 mls/hr  Q8HRS  7/16/19 14:00


    7/19/19 06:24


100 MLS/HR


 


 Prochlorperazine


 Edisylate


  (Compazine)  5 mg  PACU PRN  PRN  7/17/19 07:30


 7/18/19 07:29 DC  





 


 Propofol  20 ml @ As


 Directed  STK-MED ONCE  7/17/19 15:01


 7/17/19 15:02 DC  





 


 Ringer's Solution  1,000 ml @ 


 30 mls/hr  Q24H  7/17/19 07:24


 7/17/19 19:23 DC  





 


 Sodium Chloride  1,000 ml @ 


 400 mls/hr  Q2H30M PRN  7/19/19 08:54


 7/19/19 20:53   





 


 Vancomycin HCl


  (Vanco Per


 Pharmacy)  1 each  PRN DAILY  PRN  7/16/19 11:15


    7/18/19 13:23


1 EACH


 


 Vancomycin HCl


  (Vancomycin


 Random Level)  1 each  1X  ONCE  7/18/19 06:00


 7/18/19 06:01 DC 7/18/19 06:00


1 EACH


 


 Vancomycin HCl


 500 mg/Sodium


 Chloride  100 ml @ 


 100 mls/hr  QMWF  7/19/19 16:00


     





 


 Vancomycin HCl 2


 gm/Sodium Chloride  500 ml @ 


 250 mls/hr  1X  ONCE  7/16/19 12:00


 7/16/19 13:59 DC 7/16/19 11:46


250 MLS/HR








Lab





Laboratory Tests








Test


 7/18/19


11:07 7/18/19


16:55 7/18/19


21:22 7/19/19


04:05


 


Glucose (Fingerstick)


 204 mg/dL


(70-99) 192 mg/dL


(70-99) 169 mg/dL


(70-99) 





 


White Blood Count


 


 


 


 9.9 x10^3/uL


(4.0-11.0)


 


Red Blood Count


 


 


 


 3.46 x10^6/uL


(4.30-5.70)


 


Hemoglobin


 


 


 


 9.1 g/dL


(13.0-17.5)


 


Hematocrit


 


 


 


 29.1 %


(39.0-53.0)


 


Mean Corpuscular Volume    84 fL () 


 


Mean Corpuscular Hemoglobin    26 pg (25-35) 


 


Mean Corpuscular Hemoglobin


Concent 


 


 


 31 g/dL


(31-37)


 


Red Cell Distribution Width


 


 


 


 16.3 %


(11.5-14.5)


 


Platelet Count


 


 


 


 518 x10^3/uL


(140-400)


 


Neutrophils (%) (Auto)    60 % (31-73) 


 


Lymphocytes (%) (Auto)    25 % (24-48) 


 


Monocytes (%) (Auto)    9 % (0-9) 


 


Eosinophils (%) (Auto)    5 % (0-3) 


 


Basophils (%) (Auto)    2 % (0-3) 


 


Neutrophils # (Auto)


 


 


 


 5.9 x10^3/uL


(1.8-7.7)


 


Lymphocytes # (Auto)


 


 


 


 2.4 x10^3/uL


(1.0-4.8)


 


Monocytes # (Auto)


 


 


 


 0.8 x10^3/uL


(0.0-1.1)


 


Eosinophils # (Auto)


 


 


 


 0.5 x10^3/uL


(0.0-0.7)


 


Basophils # (Auto)


 


 


 


 0.2 x10^3/uL


(0.0-0.2)


 


Sodium Level


 


 


 


 139 mmol/L


(136-145)


 


Potassium Level


 


 


 


 4.0 mmol/L


(3.5-5.1)


 


Chloride Level


 


 


 


 99 mmol/L


()


 


Carbon Dioxide Level


 


 


 


 27 mmol/L


(21-32)


 


Anion Gap    13 (6-14) 


 


Blood Urea Nitrogen


 


 


 


 65 mg/dL


(8-26)


 


Creatinine


 


 


 


 5.1 mg/dL


(0.7-1.3)


 


Estimated GFR


(Cockcroft-Gault) 


 


 


 11.8 





 


Glucose Level


 


 


 


 101 mg/dL


(70-99)


 


Calcium Level


 


 


 


 8.8 mg/dL


(8.5-10.1)


 


Test


 7/19/19


07:40 


 


 





 


Glucose (Fingerstick)


 85 mg/dL


(70-99) 


 


 











Results


All relevant outside records, renal labs, imaging studies, telemetry/EKG's were 

reviewed.











LIN RUSSELL MD                Jul 19, 2019 10:29

## 2019-07-19 NOTE — PDOC
SUBJECTIVE


Subjective


No acute events overnight. Pain not adequately controlled. Changed percocet to 

QID scheduled as pt takes this chronically as outpt. Can have dilaudid PRN. 

Discussed with nursing the importance of pain control.





OBJECTIVE


Objective


Reviewed.


Vital Signs





Vital Signs








  Date Time  Temp Pulse Resp B/P (MAP) Pulse Ox O2 Delivery O2 Flow Rate FiO2


 


7/19/19 06:31      Room Air  


 


7/19/19 06:31      Room Air  


 


7/19/19 03:30      Room Air  


 


7/19/19 03:03      Room Air  


 


7/19/19 03:00 97.9 70 16 137/78 (97) 95   





 97.9       


 


7/19/19 02:22      Room Air  


 


7/19/19 02:21      Room Air  


 


7/18/19 23:00 97.9 71 18 125/81 (96) 96   





 97.9       


 


7/18/19 22:58      Room Air  


 


7/18/19 22:17      Room Air  


 


7/18/19 20:00      Room Air  


 


7/18/19 19:28      Room Air  


 


7/18/19 19:00 97.7 69 18 142/63 (89) 95   





 97.7       


 


7/18/19 17:22      Room Air  


 


7/18/19 16:28      Room Air  


 


7/18/19 15:00 97.7 74 19 152/55 (87) 100 Room Air  





 97.7       


 


7/18/19 13:30      Room Air  


 


7/18/19 12:39      Room Air  


 


7/18/19 11:00 98.0 73 16 141/47 (78) 98 Room Air  





 98.0       


 


7/18/19 09:47      Room Air  


 


7/18/19 08:47  74  139/70    


 


7/18/19 08:47  74  139/70    


 


7/18/19 08:46  74  139/70    


 


7/18/19 08:45      Room Air  


 


7/18/19 08:15      Room Air  








I & O











Intake and Output 


 


 7/19/19





 07:00


 


Intake Total 240 ml


 


Output Total 1025 ml


 


Balance -785 ml


 


 


 


Intake Oral 240 ml


 


Output Urine Total 1025 ml


 


# Voids 2


 


# Bowel Movements 3











PHYSICAL EXAM


Physical Exam


GENERAL: Alert, oriented


LUNGS:  Clear.


CARDIOVASCULAR:  Regular rate. 2/6 systolic murmur.


ABDOMEN:  Benign and nontender, no masses, nondistended


EXTREMITIES:  The left below-the-knee stump has a large dressing in place


covering the wounds. C/D/I. Tenderness has decreased from yesterday


NEUROLOGIC:  Physiologic and nonfocal.





ASSESSMENT/PLAN


Assessment/Plan


Left below-the-knee stump injury with lacerations and proximal nondisplaced 

fibular fracture with extension to the tibiofibular joint


Abscess of stump s/p I&D on 7/17 with evidence of abscess contacting bone 


Intractable left leg pain


Opioid dependence


ESRD on dialysis


HTN


DM2


PAD s/p L BKA





Abx per ID, will likely need long term IV abx given risk of osteomyelitis and 

risk of poor wound healing given DM2 and PAD


Pain control


Await cultures, NTD





COMMENT


Lab





Laboratory Tests








Test


 7/18/19


11:07 7/18/19


16:55 7/18/19


21:22 7/19/19


04:05


 


Glucose (Fingerstick)


 204 mg/dL


(70-99) 192 mg/dL


(70-99) 169 mg/dL


(70-99) 





 


White Blood Count


 


 


 


 9.9 x10^3/uL


(4.0-11.0)


 


Red Blood Count


 


 


 


 3.46 x10^6/uL


(4.30-5.70)


 


Hemoglobin


 


 


 


 9.1 g/dL


(13.0-17.5)


 


Hematocrit


 


 


 


 29.1 %


(39.0-53.0)


 


Mean Corpuscular Volume    84 fL () 


 


Mean Corpuscular Hemoglobin    26 pg (25-35) 


 


Mean Corpuscular Hemoglobin


Concent 


 


 


 31 g/dL


(31-37)


 


Red Cell Distribution Width


 


 


 


 16.3 %


(11.5-14.5)


 


Platelet Count


 


 


 


 518 x10^3/uL


(140-400)


 


Neutrophils (%) (Auto)    60 % (31-73) 


 


Lymphocytes (%) (Auto)    25 % (24-48) 


 


Monocytes (%) (Auto)    9 % (0-9) 


 


Eosinophils (%) (Auto)    5 % (0-3) 


 


Basophils (%) (Auto)    2 % (0-3) 


 


Neutrophils # (Auto)


 


 


 


 5.9 x10^3/uL


(1.8-7.7)


 


Lymphocytes # (Auto)


 


 


 


 2.4 x10^3/uL


(1.0-4.8)


 


Monocytes # (Auto)


 


 


 


 0.8 x10^3/uL


(0.0-1.1)


 


Eosinophils # (Auto)


 


 


 


 0.5 x10^3/uL


(0.0-0.7)


 


Basophils # (Auto)


 


 


 


 0.2 x10^3/uL


(0.0-0.2)


 


Sodium Level


 


 


 


 139 mmol/L


(136-145)


 


Potassium Level


 


 


 


 4.0 mmol/L


(3.5-5.1)


 


Chloride Level


 


 


 


 99 mmol/L


()


 


Carbon Dioxide Level


 


 


 


 27 mmol/L


(21-32)


 


Anion Gap    13 (6-14) 


 


Blood Urea Nitrogen


 


 


 


 65 mg/dL


(8-26)


 


Creatinine


 


 


 


 5.1 mg/dL


(0.7-1.3)


 


Estimated GFR


(Cockcroft-Gault) 


 


 


 11.8 





 


Glucose Level


 


 


 


 101 mg/dL


(70-99)


 


Calcium Level


 


 


 


 8.8 mg/dL


(8.5-10.1)

















MARY CALDERA MD            Jul 19, 2019 08:10

## 2019-07-20 NOTE — PDOC
Renal-Progress Notes


Subjective Notes


Notes


NO NEW COMPLAINTS





History of Present Illness


Hx of present illness


STABLE





Vitals


Vitals





Vital Signs








  Date Time  Temp Pulse Resp B/P (MAP) Pulse Ox O2 Delivery O2 Flow Rate FiO2


 


7/20/19 11:00 98.2 78 18 147/71 (96) 100   





 98.2       


 


7/20/19 10:39      Room Air  


 


7/19/19 20:50       8.0 








Weight


Weight [ ]





I.O.


Intake and Output











Intake and Output 


 


 7/20/19





 07:00


 


Intake Total 970 ml


 


Output Total 300 ml


 


Balance 670 ml


 


 


 


Intake Oral 970 ml


 


Output Urine Total 300 ml


 


# Voids 1


 


# Bowel Movements 1











Labs


Labs





Laboratory Tests








Test


 7/19/19


12:18 7/19/19


16:44 7/19/19


20:49 7/20/19


04:25


 


Glucose (Fingerstick)


 109 mg/dL


(70-99) 189 mg/dL


(70-99) 238 mg/dL


(70-99) 





 


Sodium Level


 


 


 


 139 mmol/L


(136-145)


 


Potassium Level


 


 


 


 3.7 mmol/L


(3.5-5.1)


 


Chloride Level


 


 


 


 99 mmol/L


()


 


Carbon Dioxide Level


 


 


 


 27 mmol/L


(21-32)


 


Anion Gap    13 (6-14) 


 


Blood Urea Nitrogen


 


 


 


 40 mg/dL


(8-26)


 


Creatinine


 


 


 


 3.8 mg/dL


(0.7-1.3)


 


Estimated GFR


(Cockcroft-Gault) 


 


 


 16.6 





 


Glucose Level


 


 


 


 80 mg/dL


(70-99)


 


Calcium Level


 


 


 


 9.1 mg/dL


(8.5-10.1)


 


Test


 7/20/19


07:30 7/20/19


10:29 


 





 


Glucose (Fingerstick)


 71 mg/dL


(70-99) 127 mg/dL


(70-99) 


 














Micro


Micro





Microbiology


7/15/19 Blood Culture - Preliminary, Resulted


          NO GROWTH AFTER 4 DAYS


7/17/19 Anaerobic/Aerobic Culture, Resulted


          Pending


7/17/19 Anaerobic Culture Result 1 (ROXI), Resulted


          Pending


7/17/19 Aerobic Culture, Resulted


          Pending


7/17/19 Aerobic Culture Result 1 (ROXI), Resulted


          Pending


7/17/19 Gram Stain - Final, Resulted


          


7/17/19 Gram Stain Result 1 (ROXI) - Final, Resulted


          


7/17/19 Gram Stain Result 2 (ROXI) - Final, Resulted





Review of Systems


Constitutional:  yes: chills, fever, alert, oriented


Ears/Nose/Throat:  Yes: no symptom reported


Pulmonary:  Yes no symptom reported


Cardiovascular:  Yes no symptom reported


Gastrointestional:  Yes: no symptom reported


Genitourinary:  Yes: no symptom reported


Musculoskeletal:  Yes: joint pain


Skin:  Yes no symptom reported


Psychiatric/Neurological:  Yes: no symptom reported


Endocrine:  Yes: no symptom reported





Physical Exam


General Appearance:  no apparent distress


Skin:  warm


Respiratory:  decreased breath sounds


Heart:  S1S2, no thrills


Abdomen:  soft


Genitourinary:  bladder flat


Extremities:  pulses present


Neurology:  alert, oriented





Assessment


Assessment


IMP





ESRD


ANEMIA


DM II


HTN


STUMP WOUND





PLAN





ANTIBIOTICS


HD MWF


ARANESP TO START











RACHEAL GUERRERO MD                 Jul 20, 2019 11:51

## 2019-07-20 NOTE — PDOC
Provider Note


Provider Note


afeb, vss, glucose ok- cult neg re abscess, on zosyn/vanc- pain still poorly 

controlled, will add more arielle and oxy in place of dilaudid











ARIC MATUTE MD             Jul 20, 2019 10:20

## 2019-07-20 NOTE — PDOC
Infectious Disease Note


Subjective


Subjective


c/o throbbing left knee pain


+ BM


Denies N/V/D/F/C





ROS


ROS


per HPI





Vital Sign


Vital Signs





Vital Signs








  Date Time  Temp Pulse Resp B/P (MAP) Pulse Ox O2 Delivery O2 Flow Rate FiO2


 


7/20/19 09:17      Room Air  


 


7/20/19 08:22  73  159/79    


 


7/20/19 07:00 98.2  18  100   





 98.2       


 


7/19/19 20:50       8.0 











Physical Exam


PHYSICAL EXAM


GENERAL: Propped up in bed, alert, NAD 


HEENT:  Oral cavity and pharynx clear, has poor dentition.


NECK:  Supple.  Good range of motion.


LUNGS:  Clear to auscultation bilaterally.


HEART:  S1, S2.


ABDOMEN:  Obese, soft, nontender, + BS


EXTREMITIES:  Without clubbing, cyanosis. Left knee currently bandaged. 


SKIN: warm to touch without signs of rash.  LUE-AV fistula is without signs of 

any complications.


NEUROLOGIC: ALert, answers questions appropriately


PIV





Labs


Lab





Laboratory Tests








Test


 7/19/19


12:18 7/19/19


16:44 7/19/19


20:49 7/20/19


04:25


 


Glucose (Fingerstick)


 109 mg/dL


(70-99) 189 mg/dL


(70-99) 238 mg/dL


(70-99) 





 


Sodium Level


 


 


 


 139 mmol/L


(136-145)


 


Potassium Level


 


 


 


 3.7 mmol/L


(3.5-5.1)


 


Chloride Level


 


 


 


 99 mmol/L


()


 


Carbon Dioxide Level


 


 


 


 27 mmol/L


(21-32)


 


Anion Gap    13 (6-14) 


 


Blood Urea Nitrogen


 


 


 


 40 mg/dL


(8-26)


 


Creatinine


 


 


 


 3.8 mg/dL


(0.7-1.3)


 


Estimated GFR


(Cockcroft-Gault) 


 


 


 16.6 





 


Glucose Level


 


 


 


 80 mg/dL


(70-99)


 


Calcium Level


 


 


 


 9.1 mg/dL


(8.5-10.1)


 


Test


 7/20/19


07:30 


 


 





 


Glucose (Fingerstick)


 71 mg/dL


(70-99) 


 


 











Micro





Microbiology


7/15/19 Blood Culture - Preliminary, Resulted


          NO GROWTH AFTER 4 DAYS


 





Left knee 


 ANAEROBIC RES 1  Preliminary  


        Comment





        No anaerobes recovered in 48 hours.





  AEROBIC CULT  Final  


        Final report





  AEROBIC RES 1  Final  


        Mixed skin drake





       





7/17. ANAEROBIC-AEROBIC CULTURE  


                                PENDING





Objective


Assessment


Fever - better


Left LE stump infection -s/p I and D 7/17 cult pending - Op note states bone not

infected but infection in contact with bone - recommending IV abx


S/p fall


CKD on HD


DM





Plan


Plan of Care


Cont Vanc, Zosyn  for now hopefully set up abx with HD


Trough 19.1


Cultures neg so far 


Wound care per Ortho - op note does not mention need for vac








D/w nursing


Attending Co-Sign


The patient was seen and interviewed as well as examined at the bedside. The 

chart was reviewed. The case was discussed. Agree with the plan of care.











GIOVANY DE LA ROSA        Jul 20, 2019 10:42


EDGAR ESCALANTE MD               Jul 20, 2019 12:37

## 2019-07-21 NOTE — PDOC
Provider Note


Provider Note


vss, bp ok, glucose lower so red lantus from 100 to 85 total- still zosyn/vanc 

re leg infect- added arielle re pain, plus 50 % more perc











ARIC MATUTE MD             Jul 21, 2019 09:28

## 2019-07-21 NOTE — PDOC
Renal-Progress Notes


Subjective Notes


Notes


LESS PAIN





History of Present Illness


Hx of present illness


STABLE





Vitals


Vitals





Vital Signs








  Date Time  Temp Pulse Resp B/P (MAP) Pulse Ox O2 Delivery O2 Flow Rate FiO2


 


7/21/19 10:44      Room Air  


 


7/21/19 08:46  80  146/89    


 


7/21/19 06:37 98.1  18  98   





 98.1       


 


7/20/19 08:00       8.0 








Weight


Weight [ ]





I.O.


Intake and Output











Intake and Output 


 


 7/21/19





 06:59


 


Intake Total 740 ml


 


Output Total 450 ml


 


Balance 290 ml


 


 


 


Intake Oral 640 ml


 


IV Total 100 ml


 


Output Urine Total 450 ml


 


# Voids 3











Labs


Labs





Laboratory Tests








Test


 7/20/19


16:08 7/20/19


20:10 7/21/19


07:07 7/21/19


07:48


 


Glucose (Fingerstick)


 148 mg/dL


(70-99) 145 mg/dL


(70-99) 62 mg/dL


(70-99) 66 mg/dL


(70-99)











Micro


Micro





Microbiology


7/15/19 Blood Culture - Final, Complete


          NO GROWTH AFTER 5 DAYS


7/17/19 Anaerobic/Aerobic Culture, Resulted


          Pending


7/17/19 Anaerobic Culture Result 1 (ROXI), Resulted


          Pending


7/17/19 Aerobic Culture - Preliminary, Resulted


          


7/17/19 Aerobic Culture Result 1 (ROXI) - Preliminary, Resulted


          


7/17/19 Gram Stain - Final, Resulted


          


7/17/19 Gram Stain Result 1 (ROXI) - Final, Resulted


          


7/17/19 Gram Stain Result 2 (ROXI) - Final, Resulted





Review of Systems


Constitutional:  yes: chills, fever, alert, oriented


Ears/Nose/Throat:  Yes: no symptom reported


Pulmonary:  Yes no symptom reported


Cardiovascular:  Yes no symptom reported


Gastrointestional:  Yes: no symptom reported


Genitourinary:  Yes: no symptom reported


Musculoskeletal:  Yes: joint pain


Skin:  Yes no symptom reported


Psychiatric/Neurological:  Yes: no symptom reported


Endocrine:  Yes: no symptom reported





Physical Exam


General Appearance:  no apparent distress


Skin:  warm


Respiratory:  decreased breath sounds


Heart:  S1S2, no thrills


Abdomen:  soft


Genitourinary:  bladder flat


Extremities:  pulses present


Neurology:  alert, oriented





Assessment


Assessment


IMP





ESRD


ANEMIA


DM II


HTN


STUMP WOUND





PLAN





ANTIBIOTICS


HD TOMMOROW


WOUND CARE


ANTIBIOTICS


RACHEAL MATTHEWS MD                 Jul 21, 2019 11:02

## 2019-07-21 NOTE — PDOC
Infectious Disease Note


Subjective


Subjective


c/o ongoing left knee pain


Denies N/V/D/F/C





Vital Sign


Vital Signs





Vital Signs








  Date Time  Temp Pulse Resp B/P (MAP) Pulse Ox O2 Delivery O2 Flow Rate FiO2


 


7/21/19 11:00 98.3 83 16 158/82 (107) 100 Room Air  





 98.3       


 


7/20/19 08:00       8.0 











Physical Exam


PHYSICAL EXAM


GENERAL: Propped up in bed, alert, eating 


HEENT:  Oral cavity and pharynx clear, has poor dentition.


NECK:  Supple.  Good range of motion.


LUNGS:  Clear to auscultation bilaterally.


HEART:  S1, S2.


ABDOMEN:  Obese, soft, nontender, + BS


EXTREMITIES:  Without clubbing, cyanosis. Left knee currently bandaged. 


SKIN: warm to touch without signs of rash.  LUE-AV fistula is without signs of 

any complications.


NEUROLOGIC: Alert, answers questions appropriately


PIV





Labs


Lab





Laboratory Tests








Test


 7/20/19


16:08 7/20/19


20:10 7/21/19


07:07 7/21/19


07:48


 


Glucose (Fingerstick)


 148 mg/dL


(70-99) 145 mg/dL


(70-99) 62 mg/dL


(70-99) 66 mg/dL


(70-99)


 


Test


 7/21/19


11:45 


 


 





 


Glucose (Fingerstick)


 159 mg/dL


(70-99) 


 


 











Micro





Microbiology


7/15/19 Blood Culture - Preliminary, Resulted


          NO GROWTH AFTER 5 DAYS


 





Left knee 


 ANAEROBIC RES 1  Preliminary  


        Comment





        No anaerobes recovered in 48 hours.





  AEROBIC CULT  Final  


        Final report





  AEROBIC RES 1  Final  


        Mixed skin drake





       





7/17.  ANAEROBIC RES 1  


                                PENDING





       AEROBIC RES 1  Preliminary  


        Mixed skin drake





Objective


Assessment


Fever - better


Left LE stump infection -s/p I and D 7/17 (Op note states bone not infected but 

infection in contact with bone - recommending IV abx) cult: mixed drake. 


S/p fall


CKD on HD


DM





Plan


Plan of Care


Cont Vanc, Trough 19.1


dc zosyn


start cefepime ( when ready for dc home will change to postdialysis dose 

2gm-2gm-3gm )


start po flagyl


Cultures neg so far 


Wound care per Ortho - op note does not mention need for vac








D/w nursing





Pt seen and examined


case discussed


agree with above A/P


D/W wife at bedside














GIOVANY DE LA ROSA        Jul 21, 2019 12:30


VELMA ESCALANTE MD           Jul 21, 2019 15:23

## 2019-07-22 NOTE — PDOC
Provider Note


Provider Note


STABLE, NOW FLAGYL/VANC- OK FOR DC WHEN OK RE ORTHO/ID











ARIC MATUTE MD             Jul 22, 2019 08:38

## 2019-07-22 NOTE — PDOC
Renal-Progress Notes


Subjective Notes


Notes


STILL HAVING LOTS OF PAIN





History of Present Illness


Hx of present illness


STABLE





Vitals


Vitals





Vital Signs








  Date Time  Temp Pulse Resp B/P (MAP) Pulse Ox O2 Delivery O2 Flow Rate FiO2


 


7/22/19 10:38      Room Air  


 


7/22/19 10:04  87  156/74    


 


7/22/19 07:31   20     


 


7/22/19 07:00 97.6    98   





 97.6       








Weight


Weight [ ]





I.O.


Intake and Output











Intake and Output 


 


 7/22/19





 07:00


 


Intake Total 1540 ml


 


Output Total 1250 ml


 


Balance 290 ml


 


 


 


Intake Oral 1540 ml


 


Output Urine Total 1250 ml


 


# Voids 3











Labs


Labs





Laboratory Tests








Test


 7/21/19


11:45 7/21/19


16:48 7/21/19


20:05 7/22/19


03:00


 


Glucose (Fingerstick)


 159 mg/dL


(70-99) 180 mg/dL


(70-99) 172 mg/dL


(70-99) 





 


White Blood Count


 


 


 


 10.5 x10^3/uL


(4.0-11.0)


 


Red Blood Count


 


 


 


 3.51 x10^6/uL


(4.30-5.70)


 


Hemoglobin


 


 


 


 9.4 g/dL


(13.0-17.5)


 


Hematocrit


 


 


 


 29.4 %


(39.0-53.0)


 


Mean Corpuscular Volume    84 fL () 


 


Mean Corpuscular Hemoglobin    27 pg (25-35) 


 


Mean Corpuscular Hemoglobin


Concent 


 


 


 32 g/dL


(31-37)


 


Red Cell Distribution Width


 


 


 


 16.3 %


(11.5-14.5)


 


Platelet Count


 


 


 


 513 x10^3/uL


(140-400)


 


Sodium Level


 


 


 


 139 mmol/L


(136-145)


 


Potassium Level


 


 


 


 4.3 mmol/L


(3.5-5.1)


 


Chloride Level


 


 


 


 103 mmol/L


()


 


Carbon Dioxide Level


 


 


 


 23 mmol/L


(21-32)


 


Anion Gap    13 (6-14) 


 


Blood Urea Nitrogen


 


 


 


 54 mg/dL


(8-26)


 


Creatinine


 


 


 


 4.7 mg/dL


(0.7-1.3)


 


Estimated GFR


(Cockcroft-Gault) 


 


 


 13.0 





 


Glucose Level


 


 


 


 145 mg/dL


(70-99)


 


Calcium Level


 


 


 


 8.9 mg/dL


(8.5-10.1)


 


Test


 7/22/19


07:34 


 


 





 


Glucose (Fingerstick)


 75 mg/dL


(70-99) 


 


 














Micro


Micro





Microbiology


7/15/19 Blood Culture - Final, Complete


          NO GROWTH AFTER 5 DAYS


7/17/19 Anaerobic/Aerobic Culture, Resulted


          Pending


7/17/19 Anaerobic Culture Result 1 (ROXI), Resulted


          Pending


7/17/19 Aerobic Culture - Final, Resulted


          


7/17/19 Aerobic Culture Result 1 (ROXI) - Final, Resulted


          


7/17/19 Gram Stain - Final, Resulted


          


7/17/19 Gram Stain Result 1 (ROXI) - Final, Resulted


          


7/17/19 Gram Stain Result 2 (ROXI) - Final, Resulted





Review of Systems


Constitutional:  yes: chills, fever, alert, oriented


Ears/Nose/Throat:  Yes: no symptom reported


Pulmonary:  Yes no symptom reported


Cardiovascular:  Yes no symptom reported


Gastrointestional:  Yes: no symptom reported


Genitourinary:  Yes: no symptom reported


Musculoskeletal:  Yes: joint pain


Skin:  Yes no symptom reported


Psychiatric/Neurological:  Yes: no symptom reported


Endocrine:  Yes: no symptom reported





Physical Exam


General Appearance:  no apparent distress


Skin:  warm


Respiratory:  decreased breath sounds


Heart:  S1S2, no thrills


Abdomen:  soft


Genitourinary:  bladder flat


Extremities:  pulses present


Neurology:  alert, oriented





Assessment


Assessment


IMP





ESRD


ANEMIA


DM II


HTN


STUMP WOUND





PLAN





ANTIBIOTICS


HD TODAY


UF TO DW


PAIN CONTROL


WOUND CARE


ANTIBIOTICS


RACHEAL MATTHEWS MD                 Jul 22, 2019 11:10

## 2019-07-22 NOTE — NUR
Patient's first dose of Zosyn was non-administered today due to the patient being in 
dialysis.  Pharmacy was consulted and they agreed with this writer that the 1800 dose should 
be his first one.

## 2019-07-22 NOTE — PDOC
Infectious Disease Note


Subjective:


Subjective


c/o ongoing left knee pain


Denies N/V/D/F/C





ROS:


ROS


Negative except for above.





Vital Signs:


Vital Signs





Vital Signs








  Date Time  Temp Pulse Resp B/P (MAP) Pulse Ox O2 Delivery O2 Flow Rate FiO2


 


7/22/19 10:04  87  156/74    


 


7/22/19 08:00      Room Air  


 


7/22/19 07:31   20     


 


7/22/19 07:00 97.6    98   





 97.6       











Physical Exam:


PHYSICAL EXAM


GENERAL: Propped up in bed, alert, eating 


HEENT:  Oral cavity and pharynx clear, has poor dentition.


NECK:  Supple.  Good range of motion.


LUNGS:  Clear to auscultation bilaterally.


HEART:  S1, S2.


ABDOMEN:  Obese, soft, nontender, + BS


EXTREMITIES:  Without clubbing, cyanosis. Left knee currently bandaged. taken 

down, 


open wound anteriorly ,drainage on gauze, serosanginous,thick, packing in place


rest of sutures intact


SKIN: warm to touch without signs of rash.  LUE-AV fistula is without signs of 

any complications.


NEUROLOGIC: Alert, answers questions appropriately


PIV





Medications:


Inpatient Meds:





Current Medications








 Medications


  (Trade)  Dose


 Ordered  Sig/Yisel  Start Time


 Stop Time Status Last Admin


Dose Admin


 


 Albumin Human  200 ml @ 


 200 mls/hr  1X PRN  PRN  7/22/19 09:15


 7/22/19 15:14   





 


 Albuterol Sulfate


  (Ventolin Neb


 Soln)  2.5 mg  PRN Q4HRS  PRN  7/15/19 21:15


     





 


 Amlodipine


 Besylate


  (Norvasc)  10 mg  DAILY  7/16/19 09:00


    7/22/19 10:04


10 MG


 


 Aspirin


  (Children'S


 Aspirin)  81 mg  DAILY  7/16/19 09:00


    7/22/19 10:04


81 MG


 


 Atorvastatin


 Calcium


  (Lipitor)  20 mg  HS  7/15/19 21:00


    7/21/19 20:11


20 MG


 


 Bupivacaine HCl/


 Epinephrine Bitart


  (Sensorcaine-Epi


 0.25%-1:010955


 Mpf)  30 ml  STK-MED ONCE  7/17/19 13:26


 7/17/19 14:26 DC  





 


 Cefepime HCl


  (Maxipime)  1 gm  Q24H  7/21/19 16:00


    7/21/19 17:21


1 GM


 


 Clopidogrel


 Bisulfate


  (Plavix)  75 mg  DAILY  7/16/19 09:00


    7/22/19 10:04


75 MG


 


 Cyclobenzaprine


 HCl


  (Flexeril)  10 mg  TID PRN  PRN  7/15/19 20:45


    7/22/19 02:23


10 MG


 


 Darbepoetin Benny


  (ARANESP for


 DIALYSIS PTS)  60 mcg  WEEKLYHS  7/20/19 21:00


    7/20/19 20:54


60 MCG


 


 Desflurane


  (Suprane)  15 ml  STK-MED ONCE  7/17/19 16:21


 7/17/19 16:22 DC  





 


 Dexamethasone


 Sodium Phosphate


  (Decadron)  4 mg  STK-MED ONCE  7/17/19 16:04


 7/17/19 16:05 DC  





 


 Diphenhydramine


 HCl


  (Benadryl)  25 mg  1X PRN  PRN  7/19/19 09:00


 7/20/19 08:59 DC  





 


 Famotidine


  (Pepcid)  20 mg  Q48H  7/17/19 21:00


    7/21/19 20:11


20 MG


 


 Fentanyl Citrate


  (Fentanyl 2ml


 Vial)  100 mcg  STK-MED ONCE  7/17/19 16:41


 7/17/19 16:42 DC  





 


 Furosemide


  (Lasix)  40 mg  DAILY  7/16/19 09:00


    7/22/19 10:03


40 MG


 


 Gabapentin


  (Neurontin)  300 mg  ONCE  ONCE  7/20/19 10:30


 7/20/19 10:31 DC 7/20/19 10:40


300 MG


 


 Hydromorphone HCl


  (Dilaudid)  1 mg  PRN Q6HRS  PRN  7/20/19 10:30


    7/22/19 10:00


1 MG


 


 Info


  (PHARMACY


 MONITORING -- do


 not chart)  1 each  PRN DAILY  PRN  7/22/19 09:15


   UNV  





 


 Insulin Glargine


  (Lantus)  50 units  DAILY08  7/22/19 08:00


    7/22/19 10:10


50 UNITS


 


 Lactobacillus


 Rhamnosus


  (Culturelle)  1 cap  BID  7/16/19 21:00


    7/22/19 10:04


1 CAP


 


 Lidocaine HCl


  (Lidocaine Pf 2%


 Vial)  5 ml  STK-MED ONCE  7/17/19 15:01


 7/17/19 15:02 DC  





 


 Lidocaine HCl


  (Xylocaine-Mpf


 1% 2ml Vial)  2 ml  STK-MED ONCE  7/19/19 09:00


 7/22/19 08:34 DC  





 


 Linezolid


  (Zyvox)  600 mg  BID  7/16/19 11:30


 7/20/19 12:37 DC 7/20/19 08:22


600 MG


 


 Losartan Potassium


  (Cozaar)  50 mg  DAILY  7/16/19 09:00


    7/22/19 10:03


50 MG


 


 Metoprolol


 Succinate


  (Toprol Xl)  25 mg  DAILY  7/16/19 09:00


    7/22/19 10:04


25 MG


 


 Metronidazole


  (Flagyl)  500 mg  Q12HR  7/21/19 21:00


    7/22/19 10:04


500 MG


 


 Morphine Sulfate


  (Morphine


 Sulfate)  1 mg  PRN Q10MIN  PRN  7/17/19 07:30


 7/18/19 07:29 DC  





 


 Non-Formulary


 Medication


  (Albuterol


 Sulfate (Ventolin


 Hfa Inhaler))  2 puff  Q4HRS  PRN  7/15/19 20:45


   UNV  





 


 Ondansetron HCl


  (Zofran)  4 mg  STK-MED ONCE  7/17/19 16:04


 7/17/19 16:05 DC  





 


 Oxycodone HCl


  (Roxicodone)  5 mg  PRN Q6HRS  PRN  7/16/19 08:15


 7/17/19 08:41 DC 7/16/19 19:46


5 MG


 


 Oxycodone/


 Acetaminophen


  (Percocet 10/325)  2 tab  Q8HRS  7/22/19 10:00


     





 


 Piperacillin Sod/


 Tazobactam Sod


 2.25 gm/Sodium


 Chloride  50 ml @ 


 100 mls/hr  Q8HRS  7/16/19 14:00


 7/21/19 15:23 DC 7/21/19 13:39


100 MLS/HR


 


 Prochlorperazine


 Edisylate


  (Compazine)  5 mg  PACU PRN  PRN  7/17/19 07:30


 7/18/19 07:29 DC  





 


 Propofol  20 ml @ As


 Directed  STK-MED ONCE  7/17/19 15:01


 7/17/19 15:02 DC  





 


 Ringer's Solution  1,000 ml @ 


 30 mls/hr  Q24H  7/17/19 07:24


 7/17/19 19:23 DC  





 


 Sodium Chloride  1,000 ml @ 


 400 mls/hr  Q2H30M PRN  7/22/19 09:01


 7/22/19 21:00   





 


 Vancomycin HCl


  (Vanco Per


 Pharmacy)  1 each  PRN DAILY  PRN  7/16/19 11:15


    7/19/19 13:55


1 EACH


 


 Vancomycin HCl


  (Vancomycin


 Random Level)  1 each  1X  ONCE  7/18/19 06:00


 7/18/19 06:01 DC 7/18/19 06:00


1 EACH


 


 Vancomycin HCl


 500 mg/Sodium


 Chloride  100 ml @ 


 100 mls/hr  QMWF  7/19/19 16:00


    7/19/19 16:00


100 MLS/HR


 


 Vancomycin HCl 2


 gm/Sodium Chloride  500 ml @ 


 250 mls/hr  1X  ONCE  7/16/19 12:00


 7/16/19 13:59 DC 7/16/19 11:46


250 MLS/HR











Labs:


Lab





Laboratory Tests








Test


 7/21/19


11:45 7/21/19


16:48 7/21/19


20:05 7/22/19


03:00


 


Glucose (Fingerstick)


 159 mg/dL


(70-99) 180 mg/dL


(70-99) 172 mg/dL


(70-99) 





 


White Blood Count


 


 


 


 10.5 x10^3/uL


(4.0-11.0)


 


Red Blood Count


 


 


 


 3.51 x10^6/uL


(4.30-5.70)


 


Hemoglobin


 


 


 


 9.4 g/dL


(13.0-17.5)


 


Hematocrit


 


 


 


 29.4 %


(39.0-53.0)


 


Mean Corpuscular Volume    84 fL () 


 


Mean Corpuscular Hemoglobin    27 pg (25-35) 


 


Mean Corpuscular Hemoglobin


Concent 


 


 


 32 g/dL


(31-37)


 


Red Cell Distribution Width


 


 


 


 16.3 %


(11.5-14.5)


 


Platelet Count


 


 


 


 513 x10^3/uL


(140-400)


 


Sodium Level


 


 


 


 139 mmol/L


(136-145)


 


Potassium Level


 


 


 


 4.3 mmol/L


(3.5-5.1)


 


Chloride Level


 


 


 


 103 mmol/L


()


 


Carbon Dioxide Level


 


 


 


 23 mmol/L


(21-32)


 


Anion Gap    13 (6-14) 


 


Blood Urea Nitrogen


 


 


 


 54 mg/dL


(8-26)


 


Creatinine


 


 


 


 4.7 mg/dL


(0.7-1.3)


 


Estimated GFR


(Cockcroft-Gault) 


 


 


 13.0 





 


Glucose Level


 


 


 


 145 mg/dL


(70-99)


 


Calcium Level


 


 


 


 8.9 mg/dL


(8.5-10.1)


 


Test


 7/22/19


07:34 


 


 





 


Glucose (Fingerstick)


 75 mg/dL


(70-99) 


 


 











Micro


Microbiology


7/15/19 Blood Culture - Preliminary, Resulted


          NO GROWTH AFTER 5 DAYS


 





Left knee 


 ANAEROBIC RES 1  Preliminary  


        Comment





        No anaerobes recovered in 48 hours.





  AEROBIC CULT  Final  


        Final report





  AEROBIC RES 1  Final  


        Mixed skin drake





       





7/17.  ANAEROBIC RES 1  


                                PENDING





       AEROBIC RES 1  Preliminary  


        Mixed skin drake





Objective:


Assessment:





Fever - better


Left LE stump infection -s/p I and D 7/17 (Op note states bone not infected but 

infection in contact with bone - recommending IV abx) cult: mixed drake. 


S/p fall


CKD on HD


DM





Plan:


Plan of Care


Cont Vanc, Trough 19.1


will place back on zosyn as pt is not ready for dc yet


Cultures neg so far 


Wound care per Ortho 


Ortho evaluation pending











VELMA ESCALANTE MD           Jul 22, 2019 10:27

## 2019-07-23 NOTE — PDOC
Infectious Disease Note


Subjective


Subjective


Feeling alright this morning


No F/C/S/SOA/N/V/D





Vital Sign


Vital Signs





Vital Signs








  Date Time  Temp Pulse Resp B/P (MAP) Pulse Ox O2 Delivery O2 Flow Rate FiO2


 


7/23/19 07:00 98.0 83 18 147/82 (103) 99 Room Air  





 98.0       











Physical Exam


PHYSICAL EXAM


GENERAL: Propped up in bed, alert, relaxed appearance 


HEENT:  Oral cavity and pharynx clear, has poor dentition.


NECK:  Supple.  Good range of motion.


LUNGS:  Clear to auscultation bilaterally.


HEART:  S1, S2.


ABDOMEN:  Obese, soft, nontender, + BS


EXTREMITIES:  Without clubbing, cyanosis. Left knee currently bandaged. taken d

own, 


open wound anteriorly, drainage on gauze, serosanguineous, thick, packing in 

place rest of sutures intact. No area redness 


SKIN: warm to touch without signs of rash.  LUE-AV fistula is without signs of 

any complications.


NEUROLOGIC: Alert, answers questions appropriately


PIV





Labs


Lab





Laboratory Tests








Test


 7/22/19


11:31 7/22/19


16:43 7/22/19


21:04 7/23/19


07:48


 


Glucose (Fingerstick)


 140 mg/dL


(70-99) 146 mg/dL


(70-99) 244 mg/dL


(70-99) 121 mg/dL


(70-99)








Micro





Microbiology


7/15/19 Blood Culture - Preliminary, Resulted


          NO GROWTH AFTER 5 DAYS


 





Left knee 


 ANAEROBIC RES 1  Preliminary  


        Comment





        No anaerobes recovered in 48 hours.





  AEROBIC CULT  Final  


        Final report





  AEROBIC RES 1  Final  


        Mixed skin drake





       





7/17.  ANAEROBIC RES 1  


                                PENDING





       AEROBIC RES 1  Preliminary  


        Mixed skin drake





Objective


Assessment


Fever - better


Left LE stump infection -s/p I and D 7/17 


   -Op note states bone not infected but infection in contact with bone - 

recommending IV abx. cult: mixed drake. 


S/p fall


CKD on HD


DM





Plan


Plan of Care


Cont Vanc, Trough 19.1


Continue Zosyn as pt is not ready for dc yet


Cultures neg 


Awaiting Ortho follow-up


Attending Co-Sign


The patient was seen and interviewed as well as examined at the bedside. The 

chart was reviewed. The case was discussed. Agree with the plan of care.





wound seen, small, deep, with bloody discharge, packing in place


culture so far neg


pt can be d/dennys with vanc and gent through HD


f/u with us in 3 wks











GIOVANY DE LA ROSA        Jul 23, 2019 08:36


EDGAR ESCALANTE MD               Jul 23, 2019 10:26

## 2019-07-23 NOTE — PDOC
Renal-Progress Notes


Subjective Notes


Notes


NO NEW COMPLAINTS





History of Present Illness


Hx of present illness


STABLE





Vitals


Vitals





Vital Signs








  Date Time  Temp Pulse Resp B/P (MAP) Pulse Ox O2 Delivery O2 Flow Rate FiO2


 


7/23/19 11:00 97.9 80 18 142/78 (99) 99 Room Air  





 97.9       








Weight


Weight [ ]





I.O.


Intake and Output











Intake and Output 


 


 7/23/19





 07:00


 


Intake Total 1210 ml


 


Output Total 350 ml


 


Balance 860 ml


 


 


 


Intake Oral 1210 ml


 


Output Urine Total 350 ml


 


# Voids 2











Labs


Labs





Laboratory Tests








Test


 7/22/19


16:43 7/22/19


21:04 7/23/19


07:48 7/23/19


11:13


 


Glucose (Fingerstick)


 146 mg/dL


(70-99) 244 mg/dL


(70-99) 121 mg/dL


(70-99) 180 mg/dL


(70-99)











Micro


Micro





Microbiology


7/15/19 Blood Culture - Final, Complete


          NO GROWTH AFTER 5 DAYS


7/17/19 Anaerobic/Aerobic Culture - Final, Complete


          


7/17/19 Anaerobic Culture Result 1 (ROXI) - Final, Complete


          


7/17/19 Aerobic Culture - Final, Complete


          


7/17/19 Aerobic Culture Result 1 (ROXI) - Final, Complete


          


7/17/19 Gram Stain - Final, Complete


          


7/17/19 Gram Stain Result 1 (ROXI) - Final, Complete


          


7/17/19 Gram Stain Result 2 (ROXI) - Final, Complete





Review of Systems


Constitutional:  yes: chills, fever, alert, oriented


Ears/Nose/Throat:  Yes: no symptom reported


Pulmonary:  Yes no symptom reported


Cardiovascular:  Yes no symptom reported


Gastrointestional:  Yes: no symptom reported


Genitourinary:  Yes: no symptom reported


Musculoskeletal:  Yes: joint pain


Skin:  Yes no symptom reported


Psychiatric/Neurological:  Yes: no symptom reported


Endocrine:  Yes: no symptom reported





Physical Exam


General Appearance:  no apparent distress


Skin:  warm


Respiratory:  decreased breath sounds


Heart:  S1S2, no thrills


Abdomen:  soft


Genitourinary:  bladder flat


Extremities:  pulses present


Neurology:  alert, oriented





Assessment


Assessment


IMP





ESRD


ANEMIA


DM II


HTN


STUMP WOUND





PLAN





ANTIBIOTICS


HD TOMORROW


PAIN CONTROL


WOUND CARE


ANTIBIOTICS


RACHEAL MATTHEWS MD                 Jul 23, 2019 12:07

## 2019-07-23 NOTE — PDOC
Provider Note


Provider Note


vss, no temp- L leg wound has saturated purulent packing- will ask dr mariee re 

orders re same- cont iv meds- NEEDS TO BE IN HOSPITAL











ARIC MATUTE MD             Jul 23, 2019 08:28

## 2019-07-24 NOTE — NUR
Patient discharged, home self care. [facility] antibiotics set up by Jaqueline MACIAS. Will follow up in 
the wound care clinic regarding recent I&D. IV discontinued. All belongings with patient . 
patient alert and stable. Picked up by family members.

## 2019-07-24 NOTE — PDOC
Infectious Disease Note


Subjective


Subjective


Feels ready to go home


Pain level better


No F/C/S/N/V/D





Vital Sign


Vital Signs





Vital Signs








  Date Time  Temp Pulse Resp B/P (MAP) Pulse Ox O2 Delivery O2 Flow Rate FiO2


 


7/24/19 08:00      Room Air  


 


7/24/19 07:00 97.8 88 18 141/78 (99) 99   





 97.8       











Physical Exam


PHYSICAL EXAM


GENERAL: Propped up in bed, alert, smiling, dialyzing 


LUNGS:  Clear 


HEART:  S1, S2.


ABDOMEN:  Obese, soft, nontender


EXTREMITIES:  Left knee currently bandaged.


SKIN: warm to touch without signs of rash.  


NEUROLOGIC: Alert, answers questions appropriately


PIV





Labs


Lab





Laboratory Tests








Test


 7/23/19


11:13 7/23/19


21:09 7/24/19


03:15 7/24/19


07:29


 


Glucose (Fingerstick)


 180 mg/dL


(70-99) 173 mg/dL


(70-99) 


 89 mg/dL


(70-99)


 


White Blood Count


 


 


 10.3 x10^3/uL


(4.0-11.0) 





 


Red Blood Count


 


 


 3.70 x10^6/uL


(4.30-5.70) 





 


Hemoglobin


 


 


 9.9 g/dL


(13.0-17.5) 





 


Hematocrit


 


 


 30.8 %


(39.0-53.0) 





 


Mean Corpuscular Volume   83 fL ()  


 


Mean Corpuscular Hemoglobin   27 pg (25-35)  


 


Mean Corpuscular Hemoglobin


Concent 


 


 32 g/dL


(31-37) 





 


Red Cell Distribution Width


 


 


 17.0 %


(11.5-14.5) 





 


Platelet Count


 


 


 546 x10^3/uL


(140-400) 





 


Sodium Level


 


 


 139 mmol/L


(136-145) 





 


Potassium Level


 


 


 4.1 mmol/L


(3.5-5.1) 





 


Chloride Level


 


 


 98 mmol/L


() 





 


Carbon Dioxide Level


 


 


 27 mmol/L


(21-32) 





 


Anion Gap   14 (6-14)  


 


Blood Urea Nitrogen


 


 


 65 mg/dL


(8-26) 





 


Creatinine


 


 


 5.4 mg/dL


(0.7-1.3) 





 


Estimated GFR


(Cockcroft-Gault) 


 


 11.1 


 





 


Glucose Level


 


 


 126 mg/dL


(70-99) 





 


Calcium Level


 


 


 8.7 mg/dL


(8.5-10.1) 











Micro





Microbiology


7/15/19 Blood Culture - Preliminary, Resulted


          NO GROWTH AFTER 5 DAYS


 





Left knee 


 ANAEROBIC RES 1  Preliminary  


        Comment





        No anaerobes recovered in 48 hours.





  AEROBIC CULT  Final  


        Final report





  AEROBIC RES 1  Final  


        Mixed skin drake





       





7/17.  ANAEROBIC RES 1  


                                PENDING





       AEROBIC RES 1  Preliminary  


        Mixed skin drake





Objective


Assessment


Fever - better


Left LE stump infection -s/p I and D 7/17 


   -Op note states bone not infected but infection in contact with bone - 

recommending IV abx. cult: mixed drake. 


S/p fall


CKD on HD


DM





Plan


Plan of Care


vanc and gent 


Rx in chart





weekly CBC, trough levels faxed to our office 


f/u 3 wks


Attending Co-Sign


The patient was seen and interviewed as well as examined at the bedside. The 

chart was reviewed. The case was discussed. Agree with the plan of care.


side effects explained, 


d/w GIOVANY Tran        Jul 24, 2019 10:45


EDGAR ESCALANTE MD               Jul 24, 2019 10:48

## 2019-07-24 NOTE — NUR
SS following up with discharge planning. Scripts received for IV antibiotics to be given at 
dialysis. SS contacted [facility], [phone]; fax [phone], and 
discussed with RN. SS faxed scripts and clinical to [facility]. No other 
discharge needs at this time. Per pt's RN, pt will discharge today after dialysis.

## 2019-07-24 NOTE — DS
DATE OF DISCHARGE:  07/24/2019



HOSPITAL SUMMARY:  A 55-year-old  male, who had a recent traumatic

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

had increasing pain and came in with increasing swelling and pain.  Low-grade

fever was noted, mild leukocytosis, but no change in his blood work or x-ray or

CT findings except there was evidence of soft tissue emphysema and a small

amount of gas in the soft tissue consistent with infection.  Blood cultures and

wound culture had no growth and he was treated with IV vancomycin and Zosyn and

then Dr. Riley performed incision and drainage with a moderate amount of purulent

material removed and packing placed.  He continued on vancomycin and Zosyn

throughout the hospital stay and his packing is out, his wound is healing, he is

doing much better and comfortable to be followed as an outpatient.



FINAL DIAGNOSIS:  Abscess of the left lower extremity.



OPERATIONS AND PROCEDURES:  Incision and drainage of the abscess.



COMPLICATIONS:  None.



CONSULTATIONS:  Dr. Shea of Infectious Disease, Dr. Riley.



DISPOSITION:  He will use vancomycin and gentamicin as ordered, after each

hemodialysis for an undetermined period of time, longer, perhaps 2 more weeks as

the infection was close to the bone.  Sutures were removed today from the

Emergency Room wound repair.  Meds all remain the same.  Follow up with me as

scheduled.



PROGNOSIS:  Guarded.

 



______________________________

ARIC MATUTE MD



DR:  ASHLI/tanya  JOB#:  [id number] / [id number]

DD:  07/24/2019 07:32  DT:  07/24/2019 08:12

## 2019-07-24 NOTE — PDOC
Renal-Progress Notes


Subjective Notes


Notes


STUMP PAIN





History of Present Illness


Hx of present illness


IMPROVING





Vitals


Vitals





Vital Signs








  Date Time  Temp Pulse Resp B/P (MAP) Pulse Ox O2 Delivery O2 Flow Rate FiO2


 


7/24/19 08:00      Room Air  


 


7/24/19 07:00 97.8 88 18 141/78 (99) 99   





 97.8       








Weight


Weight [ ]





I.O.


Intake and Output











Intake and Output 


 


 7/24/19





 07:00


 


Intake Total 1290 ml


 


Balance 1290 ml


 


 


 


Intake Oral 1240 ml


 


IV Total 50 ml


 


# Voids 1


 


# Bowel Movements 1











Labs


Labs





Laboratory Tests








Test


 7/23/19


11:13 7/23/19


21:09 7/24/19


03:15 7/24/19


07:29


 


Glucose (Fingerstick)


 180 mg/dL


(70-99) 173 mg/dL


(70-99) 


 89 mg/dL


(70-99)


 


White Blood Count


 


 


 10.3 x10^3/uL


(4.0-11.0) 





 


Red Blood Count


 


 


 3.70 x10^6/uL


(4.30-5.70) 





 


Hemoglobin


 


 


 9.9 g/dL


(13.0-17.5) 





 


Hematocrit


 


 


 30.8 %


(39.0-53.0) 





 


Mean Corpuscular Volume   83 fL ()  


 


Mean Corpuscular Hemoglobin   27 pg (25-35)  


 


Mean Corpuscular Hemoglobin


Concent 


 


 32 g/dL


(31-37) 





 


Red Cell Distribution Width


 


 


 17.0 %


(11.5-14.5) 





 


Platelet Count


 


 


 546 x10^3/uL


(140-400) 





 


Sodium Level


 


 


 139 mmol/L


(136-145) 





 


Potassium Level


 


 


 4.1 mmol/L


(3.5-5.1) 





 


Chloride Level


 


 


 98 mmol/L


() 





 


Carbon Dioxide Level


 


 


 27 mmol/L


(21-32) 





 


Anion Gap   14 (6-14)  


 


Blood Urea Nitrogen


 


 


 65 mg/dL


(8-26) 





 


Creatinine


 


 


 5.4 mg/dL


(0.7-1.3) 





 


Estimated GFR


(Cockcroft-Gault) 


 


 11.1 


 





 


Glucose Level


 


 


 126 mg/dL


(70-99) 





 


Calcium Level


 


 


 8.7 mg/dL


(8.5-10.1) 














Micro


Micro





Microbiology


7/15/19 Blood Culture - Final, Complete


          NO GROWTH AFTER 5 DAYS


7/17/19 Anaerobic/Aerobic Culture - Final, Complete


          


7/17/19 Anaerobic Culture Result 1 (ROXI) - Final, Complete


          


7/17/19 Aerobic Culture - Final, Complete


          


7/17/19 Aerobic Culture Result 1 (ROXI) - Final, Complete


          


7/17/19 Gram Stain - Final, Complete


          


7/17/19 Gram Stain Result 1 (ROXI) - Final, Complete


          


7/17/19 Gram Stain Result 2 (ROXI) - Final, Complete





Review of Systems


Constitutional:  yes: chills, fever, alert, oriented


Ears/Nose/Throat:  Yes: no symptom reported


Pulmonary:  Yes no symptom reported


Cardiovascular:  Yes no symptom reported


Gastrointestional:  Yes: no symptom reported


Genitourinary:  Yes: no symptom reported


Musculoskeletal:  Yes: joint pain


Skin:  Yes no symptom reported


Psychiatric/Neurological:  Yes: no symptom reported


Endocrine:  Yes: no symptom reported





Physical Exam


General Appearance:  no apparent distress


Skin:  warm


Respiratory:  decreased breath sounds


Heart:  S1S2, no thrills


Abdomen:  soft


Genitourinary:  bladder flat


Extremities:  pulses present


Neurology:  alert, oriented





Assessment


Assessment


IMP





ESRD


ANEMIA


DM II


HTN


STUMP WOUND





PLAN





ANTIBIOTICS


HD TODAY


UF TO DW


PAIN CONTROL


WOUND CARE


ANTIBIOTICS


RACHEAL MATTHEWS MD                 Jul 24, 2019 10:00

## 2019-11-24 NOTE — RAD
Single view chest dated 11/24/2019.

 

Comparison made 2/1 2019.

 

Clinical data indication: Shortness of breath and fever.

 

FINDINGS: 

 single upright portable exam performed. Heart and mediastinal contours 

are stable. Patient is status post median sternotomy.

 

Lungs are clear without focal consolidation. Mildly prominent perihilar 

linear markings, similar to prior study. No pleural effusion or 

pneumothorax.

 

IMPRESSION:

 

No acute radiographic abnormality. Stable findings compared to 2/1/2019.

 

Electronically signed by: Jim Patricio MD (11/24/2019 7:02 PM) 

[facility]

## 2019-11-24 NOTE — PHYS DOC
Past Medical History


Past Medical History:  Diabetes-Type II, Hypertension, MI, Renal Failure, Other


Additional Past Medical Histor:  MILD MI,ESRD,CHRONIC PAIN


 (TAD ASHLEY)


Past Surgical History:  Coronary Bypass Surgery, Other


Additional Past Surgical Histo:  LT ARM DIALYSIS SHUNT,L lower  amputaion


 (TAD ASHLEY)


Alcohol Use:  None


Drug Use:  None


 (TAD ASHLEY)





Adult General


Chief Complaint


Chief Complaint:  DIZZY/LIGHT HEADED





HPI


HPI





Patient is a 55  year old [male] who presents with [weakness and fever. patient 

reports he was at dialysis today and felt fine. States after dialysis he was 

with family, shortly afterwards he started to feel little bit weak, states he 

started to develop a headache, and feel uncomfortable. States approximately 2 

hours later he started to feel worse and had been found to have a fever. STates 

he has not taken anything for it. States he had normal dialysis. Reports he was 

concerned over the cause of his fever and was a little worried about his BKA 

being infected again. Denies tenderness or lesions. Spouse reports others at 

home have been ill with the same symptoms, and are concerned patient may have 

the same virus going around at home. 


 (TAD ASHLEY)





Review of Systems


Review of Systems





Constitutional: Reports fever or chills []


Eyes: Denies change in visual acuity, redness, or eye pain []


HENT: Denies nasal congestion or sore throat does report he has had some 

intermittent vertigo, had seen primary care couple days ago for this, was 

diagnosed with BPPV.[]


Respiratory: Denies cough or shortness of breath []


Cardiovascular: No additional information not addressed in HPI []


GI: Denies abdominal pain, nausea, vomiting, bloody stools or diarrhea []


: Denies dysuria or hematuria []


Musculoskeletal: Denies back pain or joint pain does report chronic pain to his 

left BKA no additional discomfort today []


Integument: Denies rash or skin lesions []


Neurologic: Denies headache, focal weakness or sensory changes []


Endocrine: Denies polyuria or polydipsia []





All other systems were reviewed and found to be within normal limits, except as 

documented in this note.


 (TAD ASHLEY)





Current Medications


Current Medications





Current Medications








 Medications


  (Trade)  Dose


 Ordered  Sig/Yisel  Start Time


 Stop Time Status Last Admin


Dose Admin


 


 Acetaminophen


  (Tylenol)  1,000 mg  1X  ONCE  11/24/19 17:15


 11/24/19 17:16 DC 11/24/19 17:24


1,000 MG


 


 Sodium Chloride  500 ml @ 


 500 mls/hr  1X  ONCE  11/24/19 17:15


 11/24/19 18:14 DC 11/24/19 17:24


500 MLS/HR





 (JAZZY HERMAN MD)





Allergies


Allergies





Allergies








Coded Allergies Type Severity Reaction Last Updated Verified


 


  No Known Drug Allergies    12/21/17 No





 (JAZZY HERMAN MD)





Physical Exam


Physical Exam





Constitutional: Well developed, well nourished, no acute distress, non-toxic 

appearance. Febrile[]


HENT: Normocephalic, atraumatic, bilateral external ears normal, oropharynx 

moist, no oral exudates, nose normal. []


Eyes: PERRLA, EOMI, conjunctiva normal, no discharge. [] 


Neck: Normal range of motion, no tenderness, supple, no stridor. [] 


Cardiovascular:Heart rate regular rhythm, no murmur []


Lungs & Thorax:  Bilateral breath sounds clear to auscultation []


Abdomen: Bowel sounds normal, soft, no tenderness, no masses, no pulsatile 

masses. [] 


Skin: Warm, dry, no erythema, no rash. [] 


Back: No tenderness, no CVA tenderness. [] 


Extremities: No tenderness, no cyanosis, no clubbing, ROM intact, no edema. Left

 leg BKA, no erythema, no lesions, no purulence, no tenderness, no swelling 

noted incision appears well-healed, [] 


Neurologic: Alert and oriented X 3, normal motor function, normal sensory 

function, no focal deficits noted. []


Psychologic: Affect normal, judgement normal, mood normal. []


 (TAD ASHLEY)





Current Patient Data


Vital Signs





                                   Vital Signs








  Date Time  Temp Pulse Resp B/P (MAP) Pulse Ox O2 Delivery O2 Flow Rate FiO2


 


11/24/19 19:47 99.5       





 99.5       


 


11/24/19 19:25  80   94   


 


11/24/19 16:50   20 157/74 (101)  Room Air  





 (JAZZY HERMAN MD)


Lab Values





                                Laboratory Tests








Test


 11/24/19


17:10 11/24/19


17:30


 


White Blood Count


 13.2 x10^3/uL


(4.0-11.0)  H 





 


Red Blood Count


 3.79 x10^6/uL


(4.30-5.70)  L 





 


Hemoglobin


 11.1 g/dL


(13.0-17.5)  L 





 


Hematocrit


 33.5 %


(39.0-53.0)  L 





 


Mean Corpuscular Volume


 88 fL ()


 





 


Mean Corpuscular Hemoglobin 29 pg (25-35)   


 


Mean Corpuscular Hemoglobin


Concent 33 g/dL


(31-37) 





 


Red Cell Distribution Width


 16.3 %


(11.5-14.5)  H 





 


Platelet Count


 224 x10^3/uL


(140-400) 





 


Neutrophils (%) (Auto) 82 % (31-73)  H 


 


Lymphocytes (%) (Auto) 12 % (24-48)  L 


 


Monocytes (%) (Auto) 5 % (0-9)   


 


Eosinophils (%) (Auto) 0 % (0-3)   


 


Basophils (%) (Auto) 1 % (0-3)   


 


Neutrophils # (Auto)


 10.8 x10^3/uL


(1.8-7.7)  H 





 


Lymphocytes # (Auto)


 1.5 x10^3/uL


(1.0-4.8) 





 


Monocytes # (Auto)


 0.7 x10^3/uL


(0.0-1.1) 





 


Eosinophils # (Auto)


 0.0 x10^3/uL


(0.0-0.7) 





 


Basophils # (Auto)


 0.1 x10^3/uL


(0.0-0.2) 





 


Prothrombin Time


 13.6 SEC


(11.7-14.0) 





 


Prothrombin Time INR 1.1 (0.8-1.1)   


 


Sodium Level


 136 mmol/L


(136-145) 





 


Potassium Level


 3.8 mmol/L


(3.5-5.1) 





 


Chloride Level


 94 mmol/L


()  L 





 


Carbon Dioxide Level


 32 mmol/L


(21-32) 





 


Anion Gap 10 (6-14)   


 


Blood Urea Nitrogen


 26 mg/dL


(8-26) 





 


Creatinine


 3.5 mg/dL


(0.7-1.3)  H 





 


Estimated GFR


(Cockcroft-Gault) 18.3  


 





 


BUN/Creatinine Ratio 7 (6-20)   


 


Glucose Level


 222 mg/dL


(70-99)  H 





 


Lactic Acid Level


 1.9 mmol/L


(0.4-2.0) 





 


Calcium Level


 9.0 mg/dL


(8.5-10.1) 





 


Total Bilirubin


 0.4 mg/dL


(0.2-1.0) 





 


Aspartate Amino Transferase


(AST) 23 U/L (15-37)


 





 


Alanine Aminotransferase (ALT)


 26 U/L (16-63)


 





 


Alkaline Phosphatase


 74 U/L


() 





 


Troponin I Quantitative


 < 0.017 ng/mL


(0.000-0.055) 





 


Total Protein


 8.4 g/dL


(6.4-8.2)  H 





 


Albumin


 3.9 g/dL


(3.4-5.0) 





 


Albumin/Globulin Ratio


 0.9 (1.0-1.7)


L 





 


Influenza Type A Antigen


 


 Negative


(NEGATIVE)


 


Influenza Type B Antigen


 


 Negative


(NEGATIVE)





                                Laboratory Tests


11/24/19 17:10








                                Laboratory Tests


11/24/19 17:10








 (JAZZY HERMAN MD)


Lab Values





                                Laboratory Tests








Test


 11/24/19


17:10 11/24/19


17:30


 


White Blood Count


 13.2 x10^3/uL


(4.0-11.0)  H 





 


Red Blood Count


 3.79 x10^6/uL


(4.30-5.70)  L 





 


Hemoglobin


 11.1 g/dL


(13.0-17.5)  L 





 


Hematocrit


 33.5 %


(39.0-53.0)  L 





 


Mean Corpuscular Volume


 88 fL ()


 





 


Mean Corpuscular Hemoglobin 29 pg (25-35)   


 


Mean Corpuscular Hemoglobin


Concent 33 g/dL


(31-37) 





 


Red Cell Distribution Width


 16.3 %


(11.5-14.5)  H 





 


Platelet Count


 224 x10^3/uL


(140-400) 





 


Neutrophils (%) (Auto) 82 % (31-73)  H 


 


Lymphocytes (%) (Auto) 12 % (24-48)  L 


 


Monocytes (%) (Auto) 5 % (0-9)   


 


Eosinophils (%) (Auto) 0 % (0-3)   


 


Basophils (%) (Auto) 1 % (0-3)   


 


Neutrophils # (Auto)


 10.8 x10^3/uL


(1.8-7.7)  H 





 


Lymphocytes # (Auto)


 1.5 x10^3/uL


(1.0-4.8) 





 


Monocytes # (Auto)


 0.7 x10^3/uL


(0.0-1.1) 





 


Eosinophils # (Auto)


 0.0 x10^3/uL


(0.0-0.7) 





 


Basophils # (Auto)


 0.1 x10^3/uL


(0.0-0.2) 





 


Prothrombin Time


 13.6 SEC


(11.7-14.0) 





 


Prothrombin Time INR 1.1 (0.8-1.1)   


 


Sodium Level


 136 mmol/L


(136-145) 





 


Potassium Level


 3.8 mmol/L


(3.5-5.1) 





 


Chloride Level


 94 mmol/L


()  L 





 


Carbon Dioxide Level


 32 mmol/L


(21-32) 





 


Anion Gap 10 (6-14)   


 


Blood Urea Nitrogen


 26 mg/dL


(8-26) 





 


Creatinine


 3.5 mg/dL


(0.7-1.3)  H 





 


Estimated GFR


(Cockcroft-Gault) 18.3  


 





 


BUN/Creatinine Ratio 7 (6-20)   


 


Glucose Level


 222 mg/dL


(70-99)  H 





 


Lactic Acid Level


 1.9 mmol/L


(0.4-2.0) 





 


Calcium Level


 9.0 mg/dL


(8.5-10.1) 





 


Total Bilirubin


 0.4 mg/dL


(0.2-1.0) 





 


Aspartate Amino Transferase


(AST) 23 U/L (15-37)


 





 


Alanine Aminotransferase (ALT)


 26 U/L (16-63)


 





 


Alkaline Phosphatase


 74 U/L


() 





 


Troponin I Quantitative


 < 0.017 ng/mL


(0.000-0.055) 





 


Total Protein


 8.4 g/dL


(6.4-8.2)  H 





 


Albumin


 3.9 g/dL


(3.4-5.0) 





 


Albumin/Globulin Ratio


 0.9 (1.0-1.7)


L 





 


Influenza Type A Antigen


 


 Negative


(NEGATIVE)


 


Influenza Type B Antigen


 


 Negative


(NEGATIVE)





                                Laboratory Tests


11/24/19 17:10








                                Laboratory Tests


11/24/19 17:10








 (TAD ASHLEY)





EKG


EKG


Sinus rhythm, NO STEMI. Left axis deviation. per Dr Brown []


 (TAD ASHLEY)





Radiology/Procedures


Radiology/Procedures


FINDINGS: 


 single upright portable exam performed. Heart and mediastinal contours 


are stable. Patient is status post median sternotomy.


 


Lungs are clear without focal consolidation. Mildly prominent perihilar 


linear markings, similar to prior study. No pleural effusion or 


pneumothorax.


 


IMPRESSION:


 


No acute radiographic abnormality. Stable findings compared to 2/1/2019.


 


Electronically signed by: Jim Patricio MD (11/24/2019 7:02 PM) 


[facility][]


 (TAD ASHLEY)





Course & Med Decision Making


Course & Med Decision Making


Pertinent Labs and Imaging studies reviewed. (See chart for details)





[Patient reports he is feeling better following Tylenol and IV fluids. Awaiting 

imaging and urine. patient reporting he is normally able to provide urine but 

has not been able to produce tonight.





Discussed imaging results without acute findings.  


Patient states he feels good enough to go home, states he will follow up with 

his PCP, reports he thinks it is the same bug that he got from his niece.


Discussed urinalysis, patient reports he will just follow up with Dr Matute for

 this, as he is unable to produce sample today. Reports no urinary complaints, 

is on dialysis but still does produce urine 


Discussed continued rest, use of home pain medications. Continue on dialysis 

schedule as normal. 


Patient without further questions]


 (TAD ASHLEY)





Dragon Disclaimer


Dragon Disclaimer


This electronic medical record was generated, in whole or in part, using a voice

recognition dictation system.


 (TAD ASHLEY)





Departure


Departure


Impression:  


   Primary Impression:  


   Fever of undetermined origin


   Additional Impression:  


   Fatigue


Disposition:  01 HOME, SELF-CARE


Condition:  STABLE


Referrals:  


ARIC MATUTE MD (PCP)


Patient Instructions:  Fatigue, Fever, Adult





Additional Instructions:  


As we discussed, continue to take her home pain medications for her discomfort. 

Continue to try drinking plenty of fluids. Keep her regular dialysis 

appointments. If you continue to feel ill, try to get in to Dr. Matute's office

this week.





Problem Qualifiers








   Additional Impression:  


   Fatigue


   Fatigue type:  postviral fatigue syndrome  Qualified Codes:  G93.3 - 

   Postviral fatigue syndrome








TAD ASHLEY              Nov 24, 2019 17:14


JAZZY HERMAN MD            Nov 25, 2019 03:46

## 2019-11-25 NOTE — EKG
[facility]

              [address] [place] [address]

Test Date:    2019               Test Time:    16:56:11

Pat Name:     ARABELLA WASHINGTON           Department:   

Patient ID:   [id number]           Room:          

Gender:       M                        Technician:   

:          [id number]               Requested By: ATD ASHLEY

Order Number: [id number]           Reading MD:   Vinay Vinson

                                 Measurements

Intervals                              Axis          

Rate:         94                       P:            -127

UT:           186                      QRS:          -36

QRSD:         90                       T:            57

QT:           338                                    

QTc:          428                                    

                           Interpretive Statements

SINUS RHYTHM

ABNORMAL LEFT AXIS DEVIATION

LEFT ANTERIOR FASCICULAR BLOCK



Electronically Signed On 2019 14:06:38 CST by Vinay Vinson